# Patient Record
Sex: MALE | Race: WHITE | NOT HISPANIC OR LATINO | Employment: FULL TIME | URBAN - METROPOLITAN AREA
[De-identification: names, ages, dates, MRNs, and addresses within clinical notes are randomized per-mention and may not be internally consistent; named-entity substitution may affect disease eponyms.]

---

## 2019-02-01 ENCOUNTER — OFFICE VISIT (OUTPATIENT)
Dept: URGENT CARE | Facility: URGENT CARE | Age: 29
End: 2019-02-01
Payer: COMMERCIAL

## 2019-02-01 VITALS
SYSTOLIC BLOOD PRESSURE: 138 MMHG | WEIGHT: 315 LBS | DIASTOLIC BLOOD PRESSURE: 78 MMHG | HEART RATE: 96 BPM | OXYGEN SATURATION: 96 %

## 2019-02-01 DIAGNOSIS — M77.9 TENDONITIS: Primary | ICD-10-CM

## 2019-02-01 PROCEDURE — 99203 OFFICE O/P NEW LOW 30 MIN: CPT | Performed by: PHYSICIAN ASSISTANT

## 2019-02-01 RX ORDER — METHYLPREDNISOLONE 4 MG
TABLET, DOSE PACK ORAL
Qty: 21 TABLET | Refills: 0 | Status: SHIPPED | OUTPATIENT
Start: 2019-02-01 | End: 2021-04-24

## 2019-02-01 NOTE — PROGRESS NOTES
medrolSUBJECTIVE:  Chief Complaint   Patient presents with     Urgent Care     tendonitis and gout flare up R foot, sx present since last night, unable to schedule appt with podiatrist     Ana YENI Ch is a 29 year old male who presents with a chief complaint of right ankle pain.  Symptoms began 1 day(s) ago, are moderate and sudden onset and still present  Context:  Injury:No. Recently wearing boots which may have started the pain as he had to modify his gait. Patient thought this may have been a gout flare up so he took 2 Colchicine and 800mg IBU. Still experiencing pain.   Pain exacerbated by walking and inversion / eversion.   Relieved by rest.  He treated it initially with Colchicine and IBU. This is not the first time this type of injury has occurred to this patient. Patient reports that he had these symptoms in the past and was treated with medrol dose pack which greatly improved his symptoms.      No past medical history on file.  Current Outpatient Medications   Medication Sig Dispense Refill     sertraline (ZOLOFT) 50 MG tablet Take 50 mg by mouth daily       Social History     Tobacco Use     Smoking status: Never Smoker     Smokeless tobacco: Never Used   Substance Use Topics     Alcohol use: Not on file       ROS:  Review of systems negative except as stated above.    EXAM:   /78   Pulse 96   Wt 143.3 kg (316 lb)   SpO2 96%   M/S Exam:Right ankle has pain with inversion and eversion. No redness, swelling or bruising noted.   GENERAL APPEARANCE: healthy, alert and no distress  EXTREMITIES: peripheral pulses normal  SKIN: no suspicious lesions or rashes  NEURO: Normal strength and tone, sensory exam grossly normal, mentation intact and speech normal    X-RAY was not done.    ASSESSMENT / PLAN:  1. Tendonitis of ankle  Patient with radiating ankle pain for one day, likely related to recent boot wear.  Has a history of tendonitis and notes that his symptoms improved last time with a medrol  dose pack. Rx given. Continue to ice, stretch and wear comfortable shoes.   - methylPREDNISolone (MEDROL DOSEPAK) 4 MG tablet therapy pack; Follow Package Directions  Dispense: 21 tablet; Refill: 0    I have discussed the patient's diagnosis and my plan of treatment with the patient. We went over any labs or imaging. Patient is aware to come back in with worsening symptoms or if no relief despite treatment plan.  Patient verbalizes understanding. All questions were addressed and answered.   Madhavi Sanders PA-C

## 2019-02-01 NOTE — PATIENT INSTRUCTIONS
Patient Education     Tendonitis  A tendon is the thick fibrous cord that joins muscle to bone and allows joints to move. When a tendon becomes inflamed, it is called tendonitis. This can occur from overuse, injury, or infection. This usually involves the shoulders, forearm, wrist, hands and feet. Symptoms include pain, swelling and tenderness to the touch. Moving the joint increases the pain.  It takes 4 to 6 weeks or more for tendonitis to heal. It is treated by preventing motion of the tendon, occasionally with a splint or brace, and the use of anti-inflammatory medicine.  Home care    Some people find relief with ice packs. These can be crushed or cubed ice in a plastic bag or a bag of frozen vegetables wrapped in a thin towel. Other people get better relief with heat. This can include a hot shower, hot bath, or a moist towel warmed in a microwave. Try each and use the method that feels best, for 15 to 20 minutes several times a day.    Rest the inflamed joint and protect it from movement.    You may use over-the-counter ibuprofen or naproxen to treat pain and inflammation, unless another medicine was prescribed. If you can't take these medicines, acetaminophen may help with the pain, but does not treat inflammation. If you have chronic liver or kidney disease or ever had a stomach ulcer or gastrointestinal bleeding, talk with your doctor before using these medicines.    As your symptoms improve, begin gradual motion at the involved joint.  Follow-up care  Follow up with your healthcare provider if you are not improving after 5 to 7 days of treatment.  When to seek medical advice  Call your healthcare provider right away if any of these occur:    Redness over the painful area    Increasing pain or swelling at the joint    Fever lasting 24 to 48 hours or chills, or as advised by your healthcare provider   Date Last Reviewed: 5/1/2018 2000-2018 [employer]. [address] [facility], [place]  [address]. All rights reserved. This information is not intended as a substitute for professional medical care. Always follow your healthcare professional's instructions.

## 2019-03-28 ENCOUNTER — OFFICE VISIT (OUTPATIENT)
Dept: URGENT CARE | Facility: URGENT CARE | Age: 29
End: 2019-03-28
Payer: COMMERCIAL

## 2019-03-28 VITALS
WEIGHT: 309 LBS | HEART RATE: 109 BPM | TEMPERATURE: 99 F | SYSTOLIC BLOOD PRESSURE: 138 MMHG | DIASTOLIC BLOOD PRESSURE: 80 MMHG | OXYGEN SATURATION: 95 %

## 2019-03-28 DIAGNOSIS — M77.9 TENDINITIS: Primary | ICD-10-CM

## 2019-03-28 PROCEDURE — 99213 OFFICE O/P EST LOW 20 MIN: CPT | Performed by: PHYSICIAN ASSISTANT

## 2019-03-28 RX ORDER — METHYLPREDNISOLONE 4 MG
TABLET, DOSE PACK ORAL
Qty: 21 TABLET | Refills: 0 | Status: SHIPPED | OUTPATIENT
Start: 2019-03-28 | End: 2021-11-15

## 2019-03-28 NOTE — PROGRESS NOTES
SUBJECTIVE:   Ana Ch is a 29 year old male presenting with a chief complaint of   Chief Complaint   Patient presents with     Urgent Care     Foot Burn     pt c/o both feet swollen sore and hard to walk on --hx of tendonitis both legs off and on since last summer--pt is wearing boot on R foot --feels better with it on for tendonitis       He is an established patient of [facility].    MS Injury/Pain    Patient presented to the urgent care for the evaluation of bilateral foot pain.  He mentioned that his bilateral feet are swollen, sore and hard to walk on.  He has history of tendinitis for which he regularly follows up with his primary care provider.  He was also seen at the urgent care in the past for similar symptoms and was prescribed Medrol pack which helped him with subsiding his pain .  onset of symptoms was 2 day(s) ago. Has a history of tendinitis flare for 1 week. Right foot has a throbbing,   Location: bilateral foot  Context: No history of injury trauma or fall.  He has past medical history of tendinitis  Course of symptoms is worsening.    Severity moderate, graded the pain without weight bearing is 2, with  Weight 7/10 on a pain scale.   Current and Associated symptoms: Pain and Warmth  Aggravating Factors: walking, running and weight-bearing  Therapies to improve symptoms include: heat and ice  This is not the first time this type of problem has occurred for this patient.       Review of Systems 10 point review of systems performed and is negative except as above and in HPI.      No past medical history on file.  Past history of tendinitis  No family history on file.  Current Outpatient Medications   Medication Sig Dispense Refill     methylPREDNISolone (MEDROL DOSEPAK) 4 MG tablet therapy pack Follow Package Directions 21 tablet 0     sertraline (ZOLOFT) 50 MG tablet Take 50 mg by mouth daily       methylPREDNISolone (MEDROL DOSEPAK) 4 MG tablet therapy pack Follow Package Directions (Patient  not taking: Reported on 3/28/2019) 21 tablet 0     Social History     Tobacco Use     Smoking status: Never Smoker     Smokeless tobacco: Never Used   Substance Use Topics     Alcohol use: Not on file       OBJECTIVE  /80 (Cuff Size: Adult Large)   Pulse 109   Temp 99  F (37.2  C) (Oral)   Wt 140.2 kg (309 lb)   SpO2 95%     Physical Exam   Constitutional: He appears well-developed and well-nourished.   HENT:   Head: Normocephalic.   Right Ear: External ear normal.   Left Ear: External ear normal.   Eyes: Conjunctivae are normal. Right eye exhibits no discharge. Left eye exhibits no discharge. No scleral icterus.   Neck: Normal range of motion. No tracheal deviation present.   Cardiovascular: Normal rate.   Pulmonary/Chest: Effort normal.   Musculoskeletal: Normal range of motion.   Bilateral dorsal aspect of the feet are swollen and tender.  Weightbearing exaggerates the pain.  Patient has normal range of motion on bilateral knee and ankle.  On palpation bilateral feet and leg temperature is slightly raised.  Feet are not erythematous.  Plantar aspect of the feet is tender on palpation right more than the left.   Vitals reviewed.      Labs:  No results found for this or any previous visit (from the past 24 hour(s)).    X-Ray was not done.    ASSESSMENT:      ICD-10-CM    1. Tendinitis M77.9 methylPREDNISolone (MEDROL DOSEPAK) 4 MG tablet therapy pack        Medical Decision Making:    Patient has recurrent history of tendinitis.  This time his symptoms are similar with his past tendinitis symptoms where he gets bilateral feet swelling and tenderness and decreased weightbearing for the pain.  On exam patient was alert oriented with and vitals are within normal limit.  Her but he has bilateral feet is mildly tender on palpation without any signs of erythema, or cellulitis.  There was no visible skin lesions noted.  Bilateral feet skin is intact.  Patient showed normal range of motion bilaterally.  And not  concerned about any fracture or dislocation or cellulitis at this time.  Patient has past medical history of recurrent tendinitis and was treated with Medrol pack which helped him significantly with the pain relief.  This time I have prescribed Medrol pack for him.  However patient is advised that if the symptoms are not getting better in next 2 days or worsening in next 24 hours then he needs to follow-up with his primary care for a possible CBC, and recheck the vitals to rule out possible cellulitis.  At this time no antibiotic has been prescribed as patient does not have any fever, systemic symptoms, and the feet did not show any signs of cellulitis.  Patient uses a boot to prevent excessive weightbearing on his feet which helped him in the past.      PLAN:  Tendinitis Medrol pack  If symptoms are not improving in 24-48 hours, or worsening please visit your primary care provider for further evaluation and management of bilateral feet swelling and pain.    Followup:    If not improving or if condition worsens, follow up with your Primary Care Provider    There are no Patient Instructions on file for this visit.

## 2019-05-19 ENCOUNTER — OFFICE VISIT (OUTPATIENT)
Dept: URGENT CARE | Facility: URGENT CARE | Age: 29
End: 2019-05-19
Payer: COMMERCIAL

## 2019-05-19 VITALS
OXYGEN SATURATION: 99 % | HEART RATE: 80 BPM | RESPIRATION RATE: 16 BRPM | SYSTOLIC BLOOD PRESSURE: 118 MMHG | DIASTOLIC BLOOD PRESSURE: 88 MMHG | TEMPERATURE: 98.3 F

## 2019-05-19 DIAGNOSIS — M79.671 RIGHT FOOT PAIN: Primary | ICD-10-CM

## 2019-05-19 DIAGNOSIS — R19.7 DIARRHEA, UNSPECIFIED TYPE: ICD-10-CM

## 2019-05-19 PROCEDURE — 99214 OFFICE O/P EST MOD 30 MIN: CPT

## 2019-05-19 NOTE — LETTER
[facility]  [address] [address]  Phone: [phone]    05/19/19    Ana Ch  [id number]  [facility] [address]      To whom it may concern:     Ana Ch was seen in the clinic today for current illness. He is feeling better and can get back to work tomorrow.    Sincerely,      [facility] Provider, MD

## 2019-05-19 NOTE — PROGRESS NOTES
Chief Complaint   Patient presents with     Urgent Care     Stomach pain and diarrhea for 5 days.  Right foot pain for 6-7 months.      SUBJECTIVE:   Ana Ch is a 29 year old male presenting with a chief complaint of right-sided foot pain which is mostly in the lateral aspect of the foot along the fifth metatarsal bone had it for 6 or 7 months but that got better but that again the pain is back for last 1 week.  He has been using insoles and wearing comfortable shoes but the pain has been still there has been seen by podiatrist did have an x-ray and there was no abnormality noted.  He also had some diarrhea episodes over the last 5 days and it resolved today and from today he has a regular bowel movement and appetite is within normal limits.  Denies any fever chills or acute abdominal pain.  Patient did miss work on Wednesday and Friday for the diarrhea.   He is an established patient of [email or web address].  Onset of symptoms was 1 week(s) ago.  Course of illness is same.    Severity moderate  Current and Associated symptoms: diarrhea resolved   Treatment measures tried include ibuprofen for the foot pain .  Predisposing factors include None.    History reviewed. No pertinent past medical history.  Current Outpatient Medications   Medication Sig Dispense Refill     sertraline (ZOLOFT) 50 MG tablet Take 50 mg by mouth daily       methylPREDNISolone (MEDROL DOSEPAK) 4 MG tablet therapy pack Follow Package Directions (Patient not taking: Reported on 5/19/2019) 21 tablet 0     methylPREDNISolone (MEDROL DOSEPAK) 4 MG tablet therapy pack Follow Package Directions (Patient not taking: Reported on 3/28/2019) 21 tablet 0     Social History     Tobacco Use     Smoking status: Never Smoker     Smokeless tobacco: Never Used   Substance Use Topics     Alcohol use: Not on file     History reviewed. No pertinent family history.      ROS:    10 point ROS of systems including Constitutional, Eyes, Respiratory, Cardiovascular,  Genitourinary, Integumentary, Psychiatric were all negative except for pertinent positives noted in my HPI         OBJECTIVE:  /88   Pulse 80   Temp 98.3  F (36.8  C) (Tympanic)   Resp 16   SpO2 99%   GENERAL APPEARANCE: healthy, alert and no distress  EYES: EOMI,  PERRL, conjunctiva clear  HENT: ear canals and TM's normal.  Nose and mouth without ulcers, erythema or lesions  NECK: supple, nontender, no lymphadenopathy  RESP: lungs clear to auscultation - no rales, rhonchi or wheezes  CV: regular rates and rhythm, normal S1 S2, no murmur noted  ABDOMEN:  soft, nontender, no HSM or masses and bowel sounds normal  Extremities: feet normal, good pulses, normal color, temperature and sensation, tenderness noted in the lateral aspect of the right foot along the fifth metatarsal bone  SKIN: no suspicious lesions or rashes  Physical Exam      X-Ray was not done.    Medical Decision Making:      ASSESSMENT:  Ana was seen today for urgent care.    Diagnoses and all orders for this visit:    Right foot pain    Diarrhea, unspecified type          PLAN:  Patient was also given a letter for his work that he was seen in the clinic today, For current illness  Ibuprofen 600 to 800 mg with food every 6-8 hours for next 3 to 4 days  Advised to do some foot stretching   Showed him some stretching exercises   Discussed that it seems to be related to tendinitis  Follow up if  symptoms fail to improve or worsens   Pt understood and agreed with plan       Jessica Fine MD     See orders in Epic

## 2019-09-08 ENCOUNTER — OFFICE VISIT (OUTPATIENT)
Dept: URGENT CARE | Facility: URGENT CARE | Age: 29
End: 2019-09-08
Payer: COMMERCIAL

## 2019-09-08 ENCOUNTER — ANCILLARY PROCEDURE (OUTPATIENT)
Dept: GENERAL RADIOLOGY | Facility: CLINIC | Age: 29
End: 2019-09-08
Attending: FAMILY MEDICINE
Payer: COMMERCIAL

## 2019-09-08 VITALS
SYSTOLIC BLOOD PRESSURE: 150 MMHG | TEMPERATURE: 98.5 F | HEART RATE: 79 BPM | BODY MASS INDEX: 46.07 KG/M2 | HEIGHT: 68 IN | WEIGHT: 304 LBS | RESPIRATION RATE: 16 BRPM | DIASTOLIC BLOOD PRESSURE: 90 MMHG

## 2019-09-08 DIAGNOSIS — M25.561 ACUTE PAIN OF RIGHT KNEE: ICD-10-CM

## 2019-09-08 DIAGNOSIS — R03.0 ELEVATED BLOOD PRESSURE READING WITHOUT DIAGNOSIS OF HYPERTENSION: ICD-10-CM

## 2019-09-08 DIAGNOSIS — M25.561 ACUTE PAIN OF RIGHT KNEE: Primary | ICD-10-CM

## 2019-09-08 PROCEDURE — 99214 OFFICE O/P EST MOD 30 MIN: CPT | Performed by: FAMILY MEDICINE

## 2019-09-08 PROCEDURE — 73562 X-RAY EXAM OF KNEE 3: CPT | Mod: RT

## 2019-09-08 RX ORDER — PREDNISONE 20 MG/1
TABLET ORAL
Qty: 12 TABLET | Refills: 0 | Status: SHIPPED | OUTPATIENT
Start: 2019-09-08 | End: 2021-11-15

## 2019-09-08 ASSESSMENT — MIFFLIN-ST. JEOR: SCORE: 2313.43

## 2019-09-08 NOTE — PROGRESS NOTES
"Subjective:   Ana Ch is a 29 year old male who presents for   Chief Complaint   Patient presents with     Knee Pain     rt knee pain,no known injury     Has had incidents in the past where extending the leg caused discomfort while walking. States 4 days ago while walking he may have hyperextended the leg while ambulating and since there has been soreness and some warmth. There is present swelling. Consistent pain since Thursday morning  He denies open wounds. No previous surgeries. No sports injuries growing up.     Hx of gout off/on the last 9 months - he is diet controlled with no flare ups 3 months. Uric acid is being monitored  Location is typically in the big toes    PCP: [facility] in [place], [place]    There are no active problems to display for this patient.      Current Outpatient Medications   Medication     order for DME     predniSONE (DELTASONE) 20 MG tablet     sertraline (ZOLOFT) 50 MG tablet     methylPREDNISolone (MEDROL DOSEPAK) 4 MG tablet therapy pack     methylPREDNISolone (MEDROL DOSEPAK) 4 MG tablet therapy pack     No current facility-administered medications for this visit.        ROS:  As above per HPI    Objective:   BP (!) 150/90   Pulse 79   Temp 98.5  F (36.9  C) (Oral)   Resp 16   Ht 1.727 m (5' 8\")   Wt 137.9 kg (304 lb)   BMI 46.22 kg/m  , Body mass index is 46.22 kg/m .  Gen:  NAD, well-nourished, sitting in chair comfortably  HEENT: EOMI, sclera anicteric, Head normocephalic, ; nares patent; moist mucous membranes  Neck: trachea midline, no thyromegaly  CV:  Hemodynamically stable  Pulm:  no increased work of breathing   R knee: obese habitus, no obvious warmth or swelling. No open wounds. Unable to perform active leg extension but has full ROM in flexion/extension passively. No bruising seen  Extrem: no cyanosis, edema or clubbing  Skin: no obvious rashes or abnormalities  Psych: Euthymic, linear thoughts, normal rate of speech  Gait: antalgic " favoring left knee    XR knee 3 views: no acute fractures per my read    No results found for this or any previous visit.      Assessment & Plan:   Ana Ch, 29 year old male who presents with:  Acute pain of right knee  Given level of discomfort and history of gout we opted to do 8 days of prednisone as a taper to help with possible inflammation. A knee brace was given to provide comfort, patient declined crutches. If symptoms not better in 3-5 days return for re-evaluation. Okay to ice the knee intermittently. Ibuprofen/tylenol for pain.   - XR Knee Right 3 Views  - predniSONE (DELTASONE) 20 MG tablet  Dispense: 12 tablet; Refill: 0  - order for DME  Dispense: 1 Device; Refill: 0    Elevated blood pressure reading without diagnosis of hypertension  Likely due to agitation, reviewed with patient and discussed should monitor at future appts      Marko Wyatt MD   [facility]    The use of Dragon/[employer] dictation services may have been used to construct the content in this note; any grammatical or spelling errors are non-intentional. Please contact the author of this note directly if you are in need of any clarification.

## 2019-09-08 NOTE — PATIENT INSTRUCTIONS
Continue the ibuprofen 600mg every 4-6 hours for pain. Okay to take tylenol for additional pain relief    Ice the knee for 10 minutes every 1-2 hours to help with any swelling you may have      If no improvement in next 5 days or if at any point the discomfort/swelling/pain gets worse return to be re-evaluated

## 2020-10-02 ENCOUNTER — OFFICE VISIT (OUTPATIENT)
Dept: URGENT CARE | Facility: URGENT CARE | Age: 30
End: 2020-10-02
Payer: COMMERCIAL

## 2020-10-02 ENCOUNTER — ANCILLARY PROCEDURE (OUTPATIENT)
Dept: GENERAL RADIOLOGY | Facility: CLINIC | Age: 30
End: 2020-10-02
Attending: NURSE PRACTITIONER
Payer: COMMERCIAL

## 2020-10-02 VITALS
OXYGEN SATURATION: 98 % | SYSTOLIC BLOOD PRESSURE: 128 MMHG | HEART RATE: 90 BPM | DIASTOLIC BLOOD PRESSURE: 86 MMHG | WEIGHT: 313 LBS | RESPIRATION RATE: 18 BRPM | BODY MASS INDEX: 47.59 KG/M2 | TEMPERATURE: 98.7 F

## 2020-10-02 DIAGNOSIS — Z87.448 HX OF CHRONIC KIDNEY DISEASE: ICD-10-CM

## 2020-10-02 DIAGNOSIS — M25.461 EFFUSION OF RIGHT KNEE: ICD-10-CM

## 2020-10-02 DIAGNOSIS — M25.561 ACUTE PAIN OF RIGHT KNEE: Primary | ICD-10-CM

## 2020-10-02 PROBLEM — N18.1 CKD (CHRONIC KIDNEY DISEASE) STAGE 1, GFR 90 ML/MIN OR GREATER: Status: ACTIVE | Noted: 2020-10-02

## 2020-10-02 PROBLEM — I10 HTN (HYPERTENSION): Status: ACTIVE | Noted: 2020-10-02

## 2020-10-02 LAB
ERYTHROCYTE [DISTWIDTH] IN BLOOD BY AUTOMATED COUNT: 13.9 % (ref 10–15)
HCT VFR BLD AUTO: 43.5 % (ref 40–53)
HGB BLD-MCNC: 14.3 G/DL (ref 13.3–17.7)
MCH RBC QN AUTO: 27.3 PG (ref 26.5–33)
MCHC RBC AUTO-ENTMCNC: 32.9 G/DL (ref 31.5–36.5)
MCV RBC AUTO: 83 FL (ref 78–100)
PLATELET # BLD AUTO: 264 10E9/L (ref 150–450)
RBC # BLD AUTO: 5.23 10E12/L (ref 4.4–5.9)
WBC # BLD AUTO: 10.8 10E9/L (ref 4–11)

## 2020-10-02 PROCEDURE — 36415 COLL VENOUS BLD VENIPUNCTURE: CPT | Performed by: NURSE PRACTITIONER

## 2020-10-02 PROCEDURE — 85027 COMPLETE CBC AUTOMATED: CPT | Performed by: NURSE PRACTITIONER

## 2020-10-02 PROCEDURE — 99214 OFFICE O/P EST MOD 30 MIN: CPT | Performed by: NURSE PRACTITIONER

## 2020-10-02 PROCEDURE — 73562 X-RAY EXAM OF KNEE 3: CPT | Mod: RT | Performed by: RADIOLOGY

## 2020-10-02 RX ORDER — PREDNISONE 20 MG/1
40 TABLET ORAL DAILY
Qty: 10 TABLET | Refills: 0 | Status: SHIPPED | OUTPATIENT
Start: 2020-10-02 | End: 2020-10-07

## 2020-10-03 NOTE — PATIENT INSTRUCTIONS
Patient Education     RICE     Rest an injury, elevate it, and use ice and compression as directed.   RICE stands for rest, ice, compression, and elevation. These can limit pain and swelling after an injury. RICE may be recommended to help treat breaks (fractures), sprains, strains, and bruises or bumps.   Home care  Here are the details of RICE:    Rest. Limit the use of the injured body part. This helps prevent further damage to the body part and gives it time to heal. In some cases, you may need a sling, brace, splint, or cast to help keep the body part still until it has healed.    Ice. Applying ice right after an injury helps relieve pain and swelling. To make an ice pack, put ice cubes in a plastic bag that seals at the top. Wrap the bag in a clean, thin towel or cloth. Then place it over the injured area. Do this for 10 to 15 minutes every 3 to 4 hours. Continue for the next 1 to 3 days or until your symptoms improve. Never put ice directly on your skin. Don't ice an area longer than 15 minutes at a time.    Compression. Putting pressure on an injury helps reduce swelling and provides support. Wrap the injured area firmly with an elastic bandage or wrap. Make sure not to wrap the bandage too tightly or you will cut off blood flow to the injured area. If your bandage loosens, rewrap it.    Elevation. Keeping an injury raised or elevated above the level of your heart reduces swelling, pain, and throbbing. For instance, if you have a broken leg, it may help to rest your leg on several pillows when sitting or lying down. Try to keep the injured area elevated as often as possible.  Follow-up care  Follow up with your healthcare provider, or as advised.  When to seek medical advice  Call your healthcare provider right away if any of these occur:    Fever of 100.4 F (38 C) or higher, or as directed by your healthcare provider    Chills    Increased pain or swelling in the injured body part    Injured body part  becomes cold, blue, numb, or tingly    Signs of infection. These include warmth in the skin, redness, drainage, or bad smell coming from the injured body part.  Date Last Reviewed: 6/1/2018 2000-2019 [employer]. [address] [place] [address]. All rights reserved. This information is not intended as a substitute for professional medical care. Always follow your healthcare professional's instructions.           Patient Education     Fluid on the Knee    Fluid on the knee is also known as knee effusion. The knee joint normally has less than 1 ounce of fluid. Injury or inflammation of the knee joint causes extra fluid to collect there. When this happens, the knee joint looks swollen and is often painful. It may be hard to fully bend the knee.  The most common cause of fluid on the knee is osteoarthritis due to wear and tear on the joint cartilage. Other causes include injury to the cartilage, inflammatory arthritis such as gout or rheumatoid arthritis, and infection of the joint.  If the cause of the fluid is not certain, you may need a needle aspiration. This procedure removes a sample of joint fluid from the knee for testing. Removing excess fluid may also relieve swelling and pain.  Home care    Limit your activities. Stay off the injured leg as much as possible until pain improves.    Keep your leg elevated to reduce pain and swelling. When sleeping, place a pillow under the injured leg. When sitting, support the injured leg so it is above heart level. This is very important during the first 48 hours.    Apply an ice pack over the injured area for 15 to 20 minutes every 3 to 6 hours. You should do this for the first 24 to 48 hours. You can make an ice pack by filling a plastic bag that seals at the top with ice cubes and then wrapping it with a thin towel. Continue to use ice packs for relief of pain and swelling as needed. As the ice melts, be careful not to get your wrap, splint, or  cast wet. After 48 hours, apply heat (warm shower or warm bath) for 15 to 20 minutes several times a day, or alternate ice and heat. If you have to wear a hook-and-loop knee brace, you can open it to apply the ice pack, or heat, directly to the knee. Never put ice directly on the skin. Always wrap the ice in a towel or other type of cloth.    You may use over-the-counter pain medicine to control pain, unless another pain medicine was prescribed. If you have chronic liver or kidney disease or have ever had a stomach ulcer or gastrointestinal bleeding, talk with your healthcare provider before using these medicines.    If crutches or a walker have been recommended, don't put weight on the injured leg until you can do so without pain. Check with your healthcare provider before returning to sports or full work duties.    If you have a hook-and-loop knee brace, you can remove it to bathe and sleep, unless told otherwise.  Follow-up care  Follow up with your healthcare provider as advised.  If you are overweight, talk to your healthcare provider about a weight loss program. The excess weight puts extra strain on your knees.  When to seek medical advice  Call your healthcare provider right away if any of these occur:    Increasing pain, redness, or swelling of the knee    Fever of 100.4 F (38 C) or above lasting for 24 to 48 hours, or as advised    Shaking chills  Date Last Reviewed: 5/1/2018 2000-2019 [employer]. [address] [place] [address], [place] [address]. All rights reserved. This information is not intended as a substitute for professional medical care. Always follow your healthcare professional's instructions.

## 2020-10-03 NOTE — PROGRESS NOTES
"SUBJECTIVE:   Ana Ch is a 30 year old male presenting with a chief complaint of   Chief Complaint   Patient presents with     Urgent Care     Knee Pain     right knee pain for couple of days. Swollen and fluid build-up.       He is an established patient of [facility].    MS Injury/Pain    Onset of symptoms was a couple days ago.  Location: right knee  Context: No known injury  Course of symptoms is worsening.    Severity severe. 7/10 with standing, 1/10 resting  Current and Associated symptoms: Pain and Swelling along with increased heat, had been red but that resolved  Denies  Fever, Bruising and Redness  Aggravating Factors: walking, weight-bearing, movement, flexion/extension and standing  Therapies to improve symptoms include: ice, Tylenol, rest and elevation which help temporarily  This is not the first time this type of problem has occurred for this patient.   He has had \"foot problems\" for the past 15 years which sometimes cause knee pain. He had similar pain a year ago which resolved with ibuprofen. He is now unable to take NSAIDs due to chronic kidney disease. Pain does wake him at night. He has a history of right knee bursitis.     Problem list, Medication list, Allergies, and Medical history reviewed in EPIC.    ROS:  Review of systems negative except for noted above    OBJECTIVE  /86   Pulse 90   Temp 98.7  F (37.1  C) (Tympanic)   Resp 18   Wt 142 kg (313 lb)   SpO2 98%   BMI 47.59 kg/m      Physical Exam  Constitutional:       General: He is not in acute distress.     Appearance: He is obese. He is not toxic-appearing.   Musculoskeletal:      Right knee: He exhibits decreased range of motion and effusion.      Comments: Unable to assess knee fully due to pain. Moderate edema right knee. Decreased ROM with flexion and extension. Patient did not tolerate palpation, ROM, or testing of ligaments   Skin:     General: Skin is warm and dry.      Capillary Refill: Capillary refill takes " less than 2 seconds.      Findings: No bruising or erythema.      Comments: No increased warmth       X-ray right knee was performed and reviewed independently by myself showing no obvious fracture or dislocation  Radiologist impression:  :  Results for orders placed or performed in visit on 10/02/20 (from the past 24 hour(s))   XR Knee Right 3 Views    Narrative    Examination:  XR KNEE RT 3 VW    Date:  10/2/2020 8:15 PM     Clinical Information: Acute pain of right knee     Additional Information: none    Comparison: 9/8/2019.      Impression    Impression:    1.  Right knee negative for fracture or dislocation.  2.  Suprapatellar soft tissue fullness suggestive of joint effusion.  3.  Prepatellar soft tissue swelling.    PAUL MADSEN MD   CBC with platelets   Result Value Ref Range    WBC 10.8 4.0 - 11.0 10e9/L    RBC Count 5.23 4.4 - 5.9 10e12/L    Hemoglobin 14.3 13.3 - 17.7 g/dL    Hematocrit 43.5 40.0 - 53.0 %    MCV 83 78 - 100 fl    MCH 27.3 26.5 - 33.0 pg    MCHC 32.9 31.5 - 36.5 g/dL    RDW 13.9 10.0 - 15.0 %    Platelet Count 264 150 - 450 10e9/L     ASSESSMENT:      ICD-10-CM    1. Acute pain of right knee  M25.561 XR Knee Right 3 Views     EXTENSOR KNEE SLEEVE XL   2. Hx of chronic kidney disease  Z87.448    3. Effusion of right knee  M25.461 CBC with platelets     predniSONE (DELTASONE) 20 MG tablet     EXTENSOR KNEE SLEEVE XL      PLAN:    Reviewed knee xray images and results with patient during visit showing no fracture or dislocation, but swelling and effusion present. Due to patients pain not tolerating exam, recommended further evaluation in orthopedic urgent care now. Patient declines. Ordered CBC to rule out infection, which is normal and reviewed during visit. Recommended RICE: rest, ice every hour for 20 minutes, compress, elevate. He is fitted with knee sleeve during visit. Prescription sent to pharmacy for prednisone 40 mg daily for 5 days for swelling since he cannot take NSAIDs  due to CKD. Potential side effects discussed.     Follow-up with orthopedic urgent care within 2 days, and sooner if symptoms worsen or new symptoms develop.     Discussed red flag symptoms which warrant immediate visit in emergency room    All questions were answered and patient verbalized understanding. AVS reviewed with patient.     Mariah Ramos, FRANSISCO, APRN, CNP 10/2/2020 8:55 PM

## 2021-04-24 ENCOUNTER — OFFICE VISIT (OUTPATIENT)
Dept: URGENT CARE | Facility: URGENT CARE | Age: 31
End: 2021-04-24
Payer: COMMERCIAL

## 2021-04-24 ENCOUNTER — ANCILLARY PROCEDURE (OUTPATIENT)
Dept: GENERAL RADIOLOGY | Facility: CLINIC | Age: 31
End: 2021-04-24
Attending: NURSE PRACTITIONER
Payer: COMMERCIAL

## 2021-04-24 VITALS
OXYGEN SATURATION: 97 % | WEIGHT: 313 LBS | TEMPERATURE: 98.4 F | BODY MASS INDEX: 47.59 KG/M2 | HEART RATE: 102 BPM | SYSTOLIC BLOOD PRESSURE: 170 MMHG | DIASTOLIC BLOOD PRESSURE: 72 MMHG

## 2021-04-24 DIAGNOSIS — S93.401A SPRAIN OF RIGHT ANKLE, UNSPECIFIED LIGAMENT, INITIAL ENCOUNTER: Primary | ICD-10-CM

## 2021-04-24 DIAGNOSIS — M77.9 TENDONITIS: ICD-10-CM

## 2021-04-24 DIAGNOSIS — S99.912A INJURY OF LEFT ANKLE, INITIAL ENCOUNTER: ICD-10-CM

## 2021-04-24 DIAGNOSIS — S99.911A INJURY OF RIGHT ANKLE, INITIAL ENCOUNTER: ICD-10-CM

## 2021-04-24 PROBLEM — M10.9 GOUT: Status: ACTIVE | Noted: 2020-03-10

## 2021-04-24 PROBLEM — F41.8 MIXED ANXIETY AND DEPRESSIVE DISORDER: Status: ACTIVE | Noted: 2020-03-10

## 2021-04-24 PROBLEM — E66.9 OBESITY: Status: ACTIVE | Noted: 2020-03-10

## 2021-04-24 PROCEDURE — 99214 OFFICE O/P EST MOD 30 MIN: CPT | Performed by: NURSE PRACTITIONER

## 2021-04-24 PROCEDURE — 73610 X-RAY EXAM OF ANKLE: CPT | Mod: RT | Performed by: RADIOLOGY

## 2021-04-24 RX ORDER — METHYLPREDNISOLONE 4 MG
TABLET, DOSE PACK ORAL
Qty: 21 TABLET | Refills: 0 | Status: SHIPPED | OUTPATIENT
Start: 2021-04-24 | End: 2021-11-15

## 2021-04-24 ASSESSMENT — ENCOUNTER SYMPTOMS
ADENOPATHY: 0
SHORTNESS OF BREATH: 0
WHEEZING: 0
WEAKNESS: 0
DIAPHORESIS: 0
ABDOMINAL PAIN: 0
APPETITE CHANGE: 0
FATIGUE: 0
ARTHRALGIAS: 1
SLEEP DISTURBANCE: 0
FEVER: 0
NAUSEA: 0
PARESTHESIAS: 0
WOUND: 0
CHILLS: 0
JOINT SWELLING: 1
DIZZINESS: 0
CHEST TIGHTNESS: 0
LIGHT-HEADEDNESS: 0
MYALGIAS: 1
COLOR CHANGE: 0
ACTIVITY CHANGE: 0
PALPITATIONS: 0
VOMITING: 0

## 2021-04-24 NOTE — PROGRESS NOTES
Chief Complaint   Patient presents with     Urgent Care     Ankle Problem     c/o ankle injury for 4 days     SUBJECTIVE:  Ana Ch is a 31 year old male who presents to the clinic today with an ankle injury. He was moving boxes 4 days ago, increasing severe lateral malleolus pain, cannot bear weight or walk on the foot, edema, lost ROM due to pain. He did not hear or feel popping or crunching. He has CKD and cannot take NSAIDS, requesting medrol dosepak as this has worked well for him in the past. Denies concerns for DVTs, redness, pitting edema, varicosities, recent covid vaccine.    No past medical history on file.  emtricitabine-tenofovir AF (DESCOVY) 200-25 MG per tablet, Take 1 tablet by mouth daily  methylPREDNISolone (MEDROL DOSEPAK) 4 MG tablet therapy pack, Follow Package Directions  order for DME, Equipment being ordered: right knee brace/sleeve  predniSONE (DELTASONE) 20 MG tablet, 40mg for 4 days then 20mg for next 4 days  sertraline (ZOLOFT) 50 MG tablet, Take 50 mg by mouth daily    No current facility-administered medications on file prior to visit.     Social History     Tobacco Use     Smoking status: Never Smoker     Smokeless tobacco: Never Used   Substance Use Topics     Alcohol use: Not on file     Allergies   Allergen Reactions     Nifedipine      Needs to have Procardia XL     Review of Systems   Constitutional: Negative for activity change, appetite change, chills, diaphoresis, fatigue and fever.   Respiratory: Negative for chest tightness, shortness of breath and wheezing.    Cardiovascular: Negative for palpitations.   Gastrointestinal: Negative for abdominal pain, nausea and vomiting.   Musculoskeletal: Positive for arthralgias, gait problem, joint swelling and myalgias.   Skin: Negative for color change, pallor, rash and wound.   Neurological: Negative for dizziness, weakness, light-headedness and paresthesias.   Hematological: Negative for adenopathy.   Psychiatric/Behavioral:  Negative for sleep disturbance.     EXAM:   BP (!) 170/72   Pulse 102   Temp 98.4  F (36.9  C) (Tympanic)   Wt 142 kg (313 lb)   SpO2 97%   BMI 47.59 kg/m      Physical Exam  Vitals signs reviewed.   Constitutional:       General: He is not in acute distress.     Appearance: Normal appearance. He is not ill-appearing, toxic-appearing or diaphoretic.   HENT:      Head: Normocephalic and atraumatic.      Nose: Nose normal.      Mouth/Throat:      Mouth: Mucous membranes are moist.      Pharynx: Oropharynx is clear.   Eyes:      Extraocular Movements: Extraocular movements intact.      Conjunctiva/sclera: Conjunctivae normal.      Pupils: Pupils are equal, round, and reactive to light.   Neck:      Musculoskeletal: Normal range of motion and neck supple.   Cardiovascular:      Rate and Rhythm: Normal rate.   Pulmonary:      Effort: Pulmonary effort is normal.   Musculoskeletal:         General: Swelling, tenderness and signs of injury present.      Comments: Right lateral malleolus with tenderness, edema and diminished range of motion due to pain. There is no redness, warmth, bruising.   Skin:     General: Skin is warm and dry.      Findings: No bruising, erythema or rash.   Neurological:      General: No focal deficit present.      Mental Status: He is alert and oriented to person, place, and time.   Psychiatric:         Mood and Affect: Mood normal.         Behavior: Behavior normal.       Xray done in clinic read by me as negative for fracture.  Results for orders placed or performed in visit on 04/24/21   XR Ankle Right G/E 3 Views     Status: None    Narrative    Examination:  XR ANKLE RIGHT G/E 3 VIEWS    Date:  4/24/2021 11:49 AM     Clinical Information: moving boxes 4 days ago, increasing severe  lateral malleolus pain, cannot bear weight or walk on the foot, edema,  lost ROM due to pain; Injury of left ankle, initial encounter.     Comparison: none.      Impression    Impression:    1.  Right ankle  negative for fracture or dislocation.    2.  Moderate ankle joint effusion. Marked soft tissue swelling  throughout the ankle, lateral greater than medial.    3.  Maintained joint spacing throughout the hindfoot joints.    PAUL MADSEN MD     ASSESSMENT:    ICD-10-CM    1. Sprain of right ankle, unspecified ligament, initial encounter  S93.401A Crutches Order for DME - ONLY FOR DME     Ankle/Foot Bracing Supplies Order for DME - ONLY FOR DME   2. Injury of right ankle, initial encounter  S99.911A CANCELED: XR Ankle Left G/E 3 Views   3. Tendonitis  M77.9 methylPREDNISolone (MEDROL DOSEPAK) 4 MG tablet therapy pack     PLAN:  Patient Instructions   Ankle xray done in clinic  Likely sprain with inflammation  Rest, ice, compression, elevate  Prednisone per request, discussed adverse side effects including hypertension etc. Could have white coat hypertension and increase due to pain.  Patient will be checking BP at home and monitoring for side effects.  Walk, stretch, watchful waiting  Reevaluation if worsening redness, warmth, pitting edema    Follow up with primary care provider with any problems, questions or concerns or if symptoms worsen or fail to improve. Patient agreed to plan and verbalized understanding.    Sabrina De La Torre, [facility]ARG[facility]

## 2021-04-24 NOTE — PATIENT INSTRUCTIONS
Ankle xray done in clinic  Likely sprain with inflammation  Rest, ice, compression, elevate  Prednisone per request, discussed adverse side effects including hypertension etc. Could have white coat hypertension and increase due to pain.  Patient will be checking BP at home and monitoring for side effects.  Walk, stretch, watchful waiting  Reevaluation if worsening redness, warmth, pitting edema

## 2021-08-20 NOTE — TELEPHONE ENCOUNTER
FUTURE VISIT INFORMATION      FUTURE VISIT INFORMATION:    Date: 9/13/21    Time: 7:30 AM    Location: [facility]  REFERRAL INFORMATION:    Referring provider: Self-Referred    Referring providers clinic:     Reason for visit/diagnosis: Right eyelid drooping again, prior surgery 2007 with Dr. Breen    RECORDS REQUESTED FROM:       Clinic name Comments Records Status Imaging [facility]   [facility] Dr. Brian [facility]    [facility] - OP Note for BILATERAL UPPER EYELID CONGENITAL PTOSIS REPAIR BY EXTERNAL LEVATOR RESECTION with Dr. Jamshid Arora

## 2021-09-13 ENCOUNTER — PRE VISIT (OUTPATIENT)
Dept: OPHTHALMOLOGY | Facility: CLINIC | Age: 31
End: 2021-09-13

## 2021-09-13 ENCOUNTER — TELEPHONE (OUTPATIENT)
Dept: OPHTHALMOLOGY | Facility: CLINIC | Age: 31
End: 2021-09-13

## 2021-09-13 ENCOUNTER — OFFICE VISIT (OUTPATIENT)
Dept: OPHTHALMOLOGY | Facility: CLINIC | Age: 31
End: 2021-09-13
Payer: COMMERCIAL

## 2021-09-13 DIAGNOSIS — Z11.59 ENCOUNTER FOR SCREENING FOR OTHER VIRAL DISEASES: ICD-10-CM

## 2021-09-13 DIAGNOSIS — Q10.0 CONGENITAL PTOSIS OF EYELID: ICD-10-CM

## 2021-09-13 PROCEDURE — 99203 OFFICE O/P NEW LOW 30 MIN: CPT | Mod: GC | Performed by: OPHTHALMOLOGY

## 2021-09-13 PROCEDURE — 92285 EXTERNAL OCULAR PHOTOGRAPHY: CPT | Mod: GC | Performed by: OPHTHALMOLOGY

## 2021-09-13 PROCEDURE — 92082 INTERMEDIATE VISUAL FIELD XM: CPT | Mod: GC | Performed by: OPHTHALMOLOGY

## 2021-09-13 RX ORDER — ALLOPURINOL 100 MG/1
50 TABLET ORAL DAILY
COMMUNITY
Start: 2021-01-22

## 2021-09-13 RX ORDER — CARVEDILOL 6.25 MG/1
6.25 TABLET ORAL
COMMUNITY
Start: 2021-03-01 | End: 2022-02-07

## 2021-09-13 RX ORDER — ACETAMINOPHEN 325 MG/1
325-650 TABLET ORAL EVERY 6 HOURS PRN
COMMUNITY

## 2021-09-13 RX ORDER — NIFEDIPINE 30 MG/1
90 TABLET, EXTENDED RELEASE ORAL
COMMUNITY
Start: 2021-04-22 | End: 2022-02-07

## 2021-09-13 ASSESSMENT — CONF VISUAL FIELD
METHOD: COUNTING FINGERS
OS_NORMAL: 1
OD_NORMAL: 1

## 2021-09-13 ASSESSMENT — VISUAL ACUITY
CORRECTION_TYPE: GLASSES
OD_CC: 20/25
METHOD: SNELLEN - LINEAR
OS_CC: 20/20

## 2021-09-13 ASSESSMENT — TONOMETRY
OD_IOP_MMHG: 09
OS_IOP_MMHG: 10
IOP_METHOD: ICARE

## 2021-09-13 ASSESSMENT — MARGIN REFLEX DISTANCE
OD_MRD2: 5
OS_MRD2: 6
OD_MRD1: 2
OS_MRD1: 4

## 2021-09-13 ASSESSMENT — LAGOPHTHALMOS
OS_LAGOPHTHALMOS: 0
OD_LAGOPHTHALMOS: 0

## 2021-09-13 ASSESSMENT — EXTERNAL EXAM - LEFT EYE: OS_EXAM: NORMAL

## 2021-09-13 ASSESSMENT — LEVATOR FUNCTION
OD_LEVATOR: 17
OS_LEVATOR: 19

## 2021-09-13 ASSESSMENT — EXTERNAL EXAM - RIGHT EYE: OD_EXAM: NORMAL

## 2021-09-13 NOTE — NURSING NOTE
Chief Complaints and History of Present Illnesses   Patient presents with     Consult For     RUL drooping     Chief Complaint(s) and History of Present Illness(es)     Consult For     Laterality: right eye    Associated symptoms: Negative for eye pain, dryness and tearing    Treatments tried: artificial tears    Pain scale: 0/10    Comments: RUL drooping              Comments     Pt notes that he has noticed possibly BUL drooping in the last few years, RUL>MEMO, he had a regular check at eye doctor and they told him to come in to be reevaluated.  Using brows or forehead to open wider.     Anay GREY September 13, 2021 7:37 AM

## 2021-09-13 NOTE — TELEPHONE ENCOUNTER
Spoke with patient to schedule surgery with Dr. Breen    Surgery was scheduled on 10/6 at [facility]  Patient will have H&P at [facility]      Patient is aware a COVID-19 test is needed before their procedure. The test should be with-in 4 days of their procedure.   Test Details: Date 10/4  Location: [facility]    Post-Op visit was scheduled on 10/18  Patient is aware a / is needed day of surgery.   Surgery packet was mailed 9/13, patient has my direct contact information for any further questions.   .

## 2021-09-13 NOTE — PROGRESS NOTES
Chief Complaints and History of Present Illnesses   Patient presents with     Consult For     RUL drooping     Chief Complaint(s) and History of Present Illness(es)     Consult For     In right eye.  Associated symptoms include Negative for eye pain, dryness   and tearing.  Treatments tried include artificial tears.  Pain was noted   as 0/10. Additional comments: RUL drooping              Comments     Pt notes that he has noticed possibly BUL drooping in the last few years,   RUL>MEMO, he had a regular check at eye doctor and they told him to come in   to be reevaluated.  Using brows or forehead to open wider.     Anayjose Tompkins COT September 13, 2021 7:37 AM           S/p Bilateral upper eyelid congenital ptosis repair by external levator resection by Dr. Breen in 2007.           FUNCTIONAL COMPLAINTS RELATED TO DROOPY EYELIDS/BROWS:  Ana Ch describes upper lids interfering with superior visual field and interfering with activities of daily living including reading, driving and watching television.     EXAM:   Dominant eye left    MRD1: Right eye 2   Left eye 4      VISUAL FIELD:  Right eye untaped:5 degrees Right eye taped:50 degrees  Left eye untaped:40 degrees Left eye taped:50 degrees    Right eye visual field improves by: 45 degrees  Left eye visual field improves by: 10 degrees        Assessment & Plan     Ana Ch is a 31 year old male with the following diagnoses:   1. Congenital ptosis of eyelid         S/p Bilateral upper eyelid congenital ptosis repair by external levator resection by Dr. Breen in 2007.     - pt happy with left upper eyelid position, but has noticed drooping of right upper eyelid drooping over the past 3-4 years  - r/b/a surgery discussed and pt would like to proceed  + gail response, discussed the need for possible bilateral surgery  - will plan for right upper eyelid ptosis repair --  external levator based on response to phenylephrine           Taylor Almonte,  MD  Oculoplastics Fellow    Today with Ana Ch, I reviewed the indications, risks, benefits, and alternatives of the proposed surgical procedure including, but not limited to, failure obtain the desired result  and need for additional surgery, bleeding, infection, loss of vision, loss of the eye, and the remote possibility of permanent damage to any organ system or death with the use of anesthesia.  I provided multiple opportunities for the questions, answered all questions to the best of my ability, and confirmed that my answers and my discussion were understood.     - Hair Breen MD 8:14 AM 9/13/2021    Attending Physician Attestation:  Complete documentation of historical and exam elements from today's encounter can be found in the full encounter summary report (not reduplicated in this progress note).  I personally obtained the chief complaint(s) and history of present illness.  I confirmed and edited as necessary the review of systems, past medical/surgical history, family history, social history, and examination findings as documented by others; and I examined the patient myself.  I personally reviewed the relevant tests, images, and reports as documented above.  I formulated and edited as necessary the assessment and plan and discussed the findings and management plan with the patient and family.  I personally reviewed the ophthalmic test(s) associated with this encounter, agree with the interpretation(s) as documented by the resident/fellow, and have edited the corresponding report(s) as necessary.   -Hair Breen MD

## 2021-09-13 NOTE — PATIENT INSTRUCTIONS
"Ptosis (Drooping Eyelids)    Eyelid ptosis (pronounced \"millie-sis\") is a condition in which the upper eyelid droops or sags. It can affect one or both eyes. Sometimes the eyelid droops enough to obstruct the upper field of vision and/or side vision, requiring correction.??Ptosis Repair is a surgical procedure that can correct drooping eyelid(s). Depending upon the degree and cause, repair involves either resection (shortening) of a muscle in the eyelid or suspension with a muscle of the brow. Typically, the levator muscle (the major muscle responsible for elevating the upper eyelid) is shortened though an incision made along the natural crease of the lid. Excess skin weighing down the eyelid may also be removed.     Congenital Ptosis  Present from birth, the most common cause of congenital ptosis is the improper development of the levator muscle. Children may need tilt their head back or lift their eyelid with a finger to see. They may also develop amblyopia (\"lazy eye\"), strabismus (eyes that are not properly aligned), astigmatism, or blurred vision. Repair for mild to moderate congenital ptosis is generally performed between ages 3 and 5. Severe visual obstruction may require earlier treatment. ??Repair is usually performed in an outpatient surgical facility under general anesthesia so the child will not become anxious or restless during the procedure.     Acquired Ptosis  Most commonly due to age-related weakening of the levator muscle, acquired ptosis may also be caused by injury, trauma, or procedures, such as cataract surgery, which can cause weak tendons to stretch. Acquired ptosis may also be the first sign of some diseases, such as myasthenia gravis (a disorder in which the muscles become weak), or Paolo's syndrome (a neurological condition that indicates injury to part of the sympathetic nervous system).? Ptosis Repair is usually performed in an outpatient surgical facility under anesthesia that induces a " "\"twilight\" state. Sedated consciousness is preferred so that Dr. Breen can accurately adjust the eyelids.     Who Should Perform The Surgery?   When choosing a surgeon to perform ptosis surgery, look for a cosmetic and reconstructive surgeon who specializes in the eyelids, orbit, and tear drain system. Dr. Breen's membership in the American Society of Ophthalmic Plastic and Reconstructive Surgery (ASOPRS) indicates he or she is not only a board certified ophthalmologist who knows the anatomy and structure of the eyelids and orbit, but also has had extensive training in ophthalmic plastic reconstructive and cosmetic surgery.    "

## 2021-09-29 ENCOUNTER — TELEPHONE (OUTPATIENT)
Dept: OPHTHALMOLOGY | Facility: CLINIC | Age: 31
End: 2021-09-29

## 2021-09-29 NOTE — TELEPHONE ENCOUNTER
Health Call Center    Phone Message    May a detailed message be left on voicemail: yes     Reason for Call: Other: Pt is scheduled for surgery with Dr. Breen next week but he has not heard anything regarding the prior authorization yet. He states he did fax something to the clinic last week regarding this and is wondering if there is an update. Please call Pt to discuss. Thank you.     Action Taken: Message routed to:  [facility]    Travel Screening: Not Applicable

## 2021-10-04 ENCOUNTER — TELEPHONE (OUTPATIENT)
Dept: OPHTHALMOLOGY | Facility: CLINIC | Age: 31
End: 2021-10-04

## 2021-10-04 NOTE — TELEPHONE ENCOUNTER
Per epic message patient would like to cancel his eye procedure with Dr. Orozco.    Patient has been removed from the schedule and all post-op follow up appointments have been removed as well.    Called patient to schedule procedure with Dr. Breen, there was no answer.  Left message with my direct line [phone].

## 2021-10-06 ENCOUNTER — HOSPITAL ENCOUNTER (OUTPATIENT)
Facility: AMBULATORY SURGERY CENTER | Age: 31
End: 2021-10-06
Attending: OPHTHALMOLOGY
Payer: COMMERCIAL

## 2021-10-06 DIAGNOSIS — Q10.0 CONGENITAL PTOSIS OF EYELID: ICD-10-CM

## 2021-10-12 NOTE — TELEPHONE ENCOUNTER
Spoke with patient to schedule surgery with Dr. Breen    Surgery was scheduled on 11/17 at [facility]  Patient will have H&P at [facility]     Patient is aware a COVID-19 test is needed before their procedure. The test should be with-in 4 days of their procedure.   Test Details: Date 11/15 Location [facility]    Post-Op visit was scheduled on 12/6  Patient is aware a / is needed day of surgery.   Surgery packet was mailed 10/12, patient has my direct contact information for any further questions.

## 2021-10-16 DIAGNOSIS — Z11.59 ENCOUNTER FOR SCREENING FOR OTHER VIRAL DISEASES: ICD-10-CM

## 2021-11-15 ENCOUNTER — OFFICE VISIT (OUTPATIENT)
Dept: INTERNAL MEDICINE | Facility: CLINIC | Age: 31
End: 2021-11-15
Payer: COMMERCIAL

## 2021-11-15 ENCOUNTER — LAB (OUTPATIENT)
Dept: URGENT CARE | Facility: URGENT CARE | Age: 31
End: 2021-11-15
Attending: OPHTHALMOLOGY
Payer: COMMERCIAL

## 2021-11-15 VITALS
BODY MASS INDEX: 43.82 KG/M2 | HEART RATE: 92 BPM | WEIGHT: 288.2 LBS | TEMPERATURE: 99 F | DIASTOLIC BLOOD PRESSURE: 70 MMHG | SYSTOLIC BLOOD PRESSURE: 124 MMHG

## 2021-11-15 DIAGNOSIS — Z11.59 ENCOUNTER FOR SCREENING FOR OTHER VIRAL DISEASES: ICD-10-CM

## 2021-11-15 DIAGNOSIS — Q10.0 CONGENITAL PTOSIS OF EYELID: ICD-10-CM

## 2021-11-15 DIAGNOSIS — Z01.818 ENCOUNTER FOR PREOPERATIVE ASSESSMENT: Primary | ICD-10-CM

## 2021-11-15 DIAGNOSIS — E66.01 MORBID OBESITY (H): ICD-10-CM

## 2021-11-15 DIAGNOSIS — N18.4 CKD (CHRONIC KIDNEY DISEASE) STAGE 4, GFR 15-29 ML/MIN (H): ICD-10-CM

## 2021-11-15 PROBLEM — N05.1 FOCAL SEGMENTAL GLOMERULOSCLEROSIS: Status: ACTIVE | Noted: 2021-11-15

## 2021-11-15 LAB — SARS-COV-2 RNA RESP QL NAA+PROBE: NEGATIVE

## 2021-11-15 PROCEDURE — 99214 OFFICE O/P EST MOD 30 MIN: CPT | Performed by: INTERNAL MEDICINE

## 2021-11-15 PROCEDURE — U0005 INFEC AGEN DETEC AMPLI PROBE: HCPCS

## 2021-11-15 PROCEDURE — U0003 INFECTIOUS AGENT DETECTION BY NUCLEIC ACID (DNA OR RNA); SEVERE ACUTE RESPIRATORY SYNDROME CORONAVIRUS 2 (SARS-COV-2) (CORONAVIRUS DISEASE [COVID-19]), AMPLIFIED PROBE TECHNIQUE, MAKING USE OF HIGH THROUGHPUT TECHNOLOGIES AS DESCRIBED BY CMS-2020-01-R: HCPCS

## 2021-11-15 NOTE — PROGRESS NOTES
[address] [address]  Phone: [phone]  Primary Provider: Sky Dyer  Pre-op Performing Provider: PRUDENCIO NIELSEN    PREOPERATIVE EVALUATION:  Today's date: 11/15/2021    Ana Ch is a 31 year old male who presents for a preoperative evaluation.    Surgical Information:  Surgery/Procedure: Right upper ptosis repair  Surgery Location: [facility]  Surgeon: Dr. Breen  Surgery Date: 11/17/2021  Time of Surgery: 1:05pm  Where patient plans to recover: At home with family  Fax number for surgical facility:     Type of Anesthesia Anticipated: to be determined    Assessment & Plan     The proposed surgical procedure is considered INTERMEDIATE risk.    Encounter for preoperative assessment  Congenital ptosis of eyelid  Approval given to proceed with proposed procedure without further diagnostic evaluation. He has significant CKD but gets labs checked through his nephrologist [id number]. Reviewed those labs today (last checked ~2 months ago) and found them to be stable over the last 9 months with GFR around 17 consistently during that time. Medications were reviewed in detail today. On the morning of surgery, take all normal medications with a small sip of water. Resume all medications post-operatively at the same doses unless otherwise directed by surgical care team.    CKD (chronic kidney disease) stage 4, GFR 15-29 ml/min (H)  Known issue that I take into account for their medical decisions, no current exacerbations or new concerns. Managed by outside nephrologist.     Morbid obesity (H)  Known issue that I take into account for their medical decisions, no current exacerbations or new concerns. Weight loss would help with HTN control.    RECOMMENDATION:  APPROVAL GIVEN to proceed with proposed procedure, without further diagnostic evaluation.    Subjective   HPI related to upcoming procedure: Ana presents for a pre-op exam.  He has no other complaints. He reports no personal history of cardiac disease. He is able to walk up a flight of stairs without stopping, getting short of breath, or developing chest pain.    Preop Questions 11/15/2021   1. Have you ever had a heart attack or stroke? No   2. Have you ever had surgery on your heart or blood vessels, such as a stent placement, a coronary artery bypass, or surgery on an artery in your head, neck, heart, or legs? No   3. Do you have chest pain with activity? No   4. Do you have a history of  heart failure? No   5. Do you currently have a cold, bronchitis or symptoms of other infection? No   6. Do you have a cough, shortness of breath, or wheezing? No   7. Do you or anyone in your family have previous history of blood clots? No   8. Do you or does anyone in your family have a serious bleeding problem such as prolonged bleeding following surgeries or cuts? No   9. Have you ever had problems with anemia or been told to take iron pills? No   10. Have you had any abnormal blood loss such as black, tarry or bloody stools? No   11. Have you ever had a blood transfusion? No   12. Are you willing to have a blood transfusion if it is medically needed before, during, or after your surgery? Yes   13. Have you or any of your relatives ever had problems with anesthesia? No   14. Do you have sleep apnea, excessive snoring or daytime drowsiness? No   15. Do you have any artifical heart valves or other implanted medical devices like a pacemaker, defibrillator, or continuous glucose monitor? No   16. Do you have artificial joints? No   17. Are you allergic to latex? No     Health Care Directive:  Patient does not have a Health Care Directive or Living Will: Discussed advance care planning with patient; however, patient declined at this time.    Preoperative Review of :   reviewed - controlled substances prescribed by other outside provider(s).    Review of Systems  Constitutional, neuro, ENT,  endocrine, pulmonary, cardiac, gastrointestinal, genitourinary, musculoskeletal, integument and psychiatric systems are negative, except as otherwise noted.    Patient Active Problem List    Diagnosis Date Noted     Focal segmental glomerulosclerosis 11/15/2021     Priority: Medium     Congenital ptosis of eyelid 09/13/2021     Priority: Medium     Added automatically from request for surgery [id number]       HTN (hypertension) 10/02/2020     Priority: Medium     CKD (chronic kidney disease) stage 4, GFR 15-29 ml/min (H) 10/02/2020     Priority: Medium     Gout 03/10/2020     Priority: Medium     Mixed anxiety and depressive disorder 03/10/2020     Priority: Medium     Obesity 03/10/2020     Priority: Medium      History reviewed. No pertinent past medical history.  Past Surgical History:   Procedure Laterality Date     REPAIR PTOSIS Bilateral 2007     Current Outpatient Medications   Medication Sig Dispense Refill     acetaminophen (TYLENOL) 325 MG tablet Take 325-650 mg by mouth every 6 hours as needed       allopurinol (ZYLOPRIM) 100 MG tablet Take 50 mg by mouth       carvedilol (COREG) 6.25 MG tablet Take 6.25 mg by mouth       emtricitabine-tenofovir AF (DESCOVY) 200-25 MG per tablet Take 1 tablet by mouth daily       Melatonin 2.5 MG CAPS        NIFEdipine ER OSMOTIC (PROCARDIA XL) 30 MG 24 hr tablet Take 90 mg by mouth       order for DME Equipment being ordered: right knee brace/sleeve 1 Device 0     sertraline (ZOLOFT) 50 MG tablet Take 50 mg by mouth daily       Allergies   Allergen Reactions     Nifedipine      Needs to have Procardia XL      Social History     Tobacco Use     Smoking status: Never Smoker     Smokeless tobacco: Never Used   Substance Use Topics     Alcohol use: Not on file     History   Drug Use Not on file         Objective   /70   Pulse 92   Temp 99  F (37.2  C) (Tympanic)   Wt 130.7 kg (288 lb 3.2 oz)   BMI 43.82 kg/m      Physical Exam  GENERAL: alert and in no  distress.  EYES: conjunctivae/corneas clear.  RESP: CTAB, no w/r/r.  CV: RRR, no m/r/g.  GI: NT, ND, without rebound tenderness or guarding  MSK: No edema. Moves all four extremities freely.  SKIN: No significant ulcers, lesions, or rashes on the visualized portions of the skin  NEURO: Alert. Oriented.    Recent Labs   Lab Test 10/02/20  2032   HGB 14.3         Renal function panel  Order: [id number]   Ref Range & Units 2 mo ago   Sodium 136 - 145 mmol/L 143    Potassium 3.5 - 5.1 mmol/L 4.0    Chloride 98 - 112 mmol/L 117 High     Carbon Dioxide (CO2) 21 - 32 mmol/L 19 Low     BUN 7 - 24 mg/dL 50 High     Creatinine 0.70 - 1.30 mg/dL 4.30 High     GFR EST NON AFRICAN >60 mL/min 17 Low     eGFR  >60 mL/min 20 Low     Glucose 74 - 106 mg/dL 113 High     Calcium 8.5 - 10.1 mg/dL 9.1    Albumin 3.4 - 5.0 g/dL 3.9    Phosphorus 2.5 - 4.9 mg/dL 5.3 High     Anion Gap 0.0 - 15.0 mmol/L 7.0    Resulting Agency  [facility]       Diagnostics:  No labs were ordered during this visit.   No EKG required, no history of coronary heart disease, significant arrhythmia, peripheral arterial disease or other structural heart disease.    Revised Cardiac Risk Index (RCRI):  The patient has the following serious cardiovascular risks for perioperative complications:   - Serum Creatinine >2.0 mg/dl = 1 point     RCRI Interpretation: 1 point: Class II (low risk - 0.9% complication rate)      Signed Electronically by: Irvin Hernandez MD  Copy of this evaluation report is provided to requesting physician.

## 2021-11-16 ENCOUNTER — ANESTHESIA EVENT (OUTPATIENT)
Dept: SURGERY | Facility: AMBULATORY SURGERY CENTER | Age: 31
End: 2021-11-16
Payer: COMMERCIAL

## 2021-11-17 ENCOUNTER — HOSPITAL ENCOUNTER (OUTPATIENT)
Facility: AMBULATORY SURGERY CENTER | Age: 31
End: 2021-11-17
Attending: OPHTHALMOLOGY
Payer: COMMERCIAL

## 2021-11-17 ENCOUNTER — ANESTHESIA (OUTPATIENT)
Dept: SURGERY | Facility: AMBULATORY SURGERY CENTER | Age: 31
End: 2021-11-17
Payer: COMMERCIAL

## 2021-11-17 VITALS
HEART RATE: 65 BPM | WEIGHT: 288 LBS | SYSTOLIC BLOOD PRESSURE: 132 MMHG | TEMPERATURE: 97.5 F | RESPIRATION RATE: 16 BRPM | DIASTOLIC BLOOD PRESSURE: 71 MMHG | HEIGHT: 68 IN | OXYGEN SATURATION: 99 % | BODY MASS INDEX: 43.65 KG/M2

## 2021-11-17 DIAGNOSIS — H02.401 PTOSIS OF RIGHT EYELID: Primary | ICD-10-CM

## 2021-11-17 DIAGNOSIS — Q10.0 CONGENITAL PTOSIS OF EYELID: ICD-10-CM

## 2021-11-17 PROCEDURE — 67904 REPAIR EYELID DEFECT: CPT | Mod: RT

## 2021-11-17 PROCEDURE — 67904 REPAIR EYELID DEFECT: CPT | Mod: RT | Performed by: OPHTHALMOLOGY

## 2021-11-17 RX ORDER — LIDOCAINE HYDROCHLORIDE AND EPINEPHRINE 10; 10 MG/ML; UG/ML
INJECTION, SOLUTION INFILTRATION; PERINEURAL PRN
Status: DISCONTINUED | OUTPATIENT
Start: 2021-11-17 | End: 2021-11-17 | Stop reason: HOSPADM

## 2021-11-17 RX ORDER — OXYCODONE HYDROCHLORIDE 5 MG/1
5 TABLET ORAL EVERY 4 HOURS PRN
Status: DISCONTINUED | OUTPATIENT
Start: 2021-11-17 | End: 2021-11-18 | Stop reason: HOSPADM

## 2021-11-17 RX ORDER — KETOROLAC TROMETHAMINE 30 MG/ML
15 INJECTION, SOLUTION INTRAMUSCULAR; INTRAVENOUS EVERY 6 HOURS PRN
Status: DISCONTINUED | OUTPATIENT
Start: 2021-11-17 | End: 2021-11-18 | Stop reason: HOSPADM

## 2021-11-17 RX ORDER — OXYCODONE HYDROCHLORIDE 5 MG/1
5 TABLET ORAL EVERY 6 HOURS PRN
Qty: 6 TABLET | Refills: 0 | Status: SHIPPED | OUTPATIENT
Start: 2021-11-17 | End: 2021-11-20

## 2021-11-17 RX ORDER — FENTANYL CITRATE 50 UG/ML
INJECTION, SOLUTION INTRAMUSCULAR; INTRAVENOUS PRN
Status: DISCONTINUED | OUTPATIENT
Start: 2021-11-17 | End: 2021-11-17

## 2021-11-17 RX ORDER — ONDANSETRON 2 MG/ML
4 INJECTION INTRAMUSCULAR; INTRAVENOUS EVERY 30 MIN PRN
Status: DISCONTINUED | OUTPATIENT
Start: 2021-11-17 | End: 2021-11-18 | Stop reason: HOSPADM

## 2021-11-17 RX ORDER — LORAZEPAM 2 MG/ML
.5-1 INJECTION INTRAMUSCULAR
Status: DISCONTINUED | OUTPATIENT
Start: 2021-11-17 | End: 2021-11-17 | Stop reason: HOSPADM

## 2021-11-17 RX ORDER — PROPOFOL 10 MG/ML
INJECTION, EMULSION INTRAVENOUS PRN
Status: DISCONTINUED | OUTPATIENT
Start: 2021-11-17 | End: 2021-11-17

## 2021-11-17 RX ORDER — ERYTHROMYCIN 5 MG/G
OINTMENT OPHTHALMIC PRN
Status: DISCONTINUED | OUTPATIENT
Start: 2021-11-17 | End: 2021-11-17 | Stop reason: HOSPADM

## 2021-11-17 RX ORDER — SODIUM CHLORIDE, SODIUM LACTATE, POTASSIUM CHLORIDE, CALCIUM CHLORIDE 600; 310; 30; 20 MG/100ML; MG/100ML; MG/100ML; MG/100ML
INJECTION, SOLUTION INTRAVENOUS CONTINUOUS PRN
Status: DISCONTINUED | OUTPATIENT
Start: 2021-11-17 | End: 2021-11-17

## 2021-11-17 RX ORDER — SODIUM CHLORIDE, SODIUM LACTATE, POTASSIUM CHLORIDE, CALCIUM CHLORIDE 600; 310; 30; 20 MG/100ML; MG/100ML; MG/100ML; MG/100ML
INJECTION, SOLUTION INTRAVENOUS CONTINUOUS
Status: DISCONTINUED | OUTPATIENT
Start: 2021-11-17 | End: 2021-11-18 | Stop reason: HOSPADM

## 2021-11-17 RX ORDER — ALBUTEROL SULFATE 0.83 MG/ML
2.5 SOLUTION RESPIRATORY (INHALATION) EVERY 4 HOURS PRN
Status: DISCONTINUED | OUTPATIENT
Start: 2021-11-17 | End: 2021-11-17 | Stop reason: HOSPADM

## 2021-11-17 RX ORDER — MEPERIDINE HYDROCHLORIDE 25 MG/ML
12.5 INJECTION INTRAMUSCULAR; INTRAVENOUS; SUBCUTANEOUS
Status: DISCONTINUED | OUTPATIENT
Start: 2021-11-17 | End: 2021-11-18 | Stop reason: HOSPADM

## 2021-11-17 RX ORDER — FENTANYL CITRATE 50 UG/ML
25-50 INJECTION, SOLUTION INTRAMUSCULAR; INTRAVENOUS EVERY 5 MIN PRN
Status: DISCONTINUED | OUTPATIENT
Start: 2021-11-17 | End: 2021-11-17 | Stop reason: HOSPADM

## 2021-11-17 RX ORDER — SODIUM CHLORIDE, SODIUM LACTATE, POTASSIUM CHLORIDE, CALCIUM CHLORIDE 600; 310; 30; 20 MG/100ML; MG/100ML; MG/100ML; MG/100ML
INJECTION, SOLUTION INTRAVENOUS CONTINUOUS
Status: DISCONTINUED | OUTPATIENT
Start: 2021-11-17 | End: 2021-11-17 | Stop reason: HOSPADM

## 2021-11-17 RX ORDER — ACETAMINOPHEN 325 MG/1
975 TABLET ORAL ONCE
Status: COMPLETED | OUTPATIENT
Start: 2021-11-17 | End: 2021-11-17

## 2021-11-17 RX ORDER — FENTANYL CITRATE 50 UG/ML
25 INJECTION, SOLUTION INTRAMUSCULAR; INTRAVENOUS
Status: DISCONTINUED | OUTPATIENT
Start: 2021-11-17 | End: 2021-11-18 | Stop reason: HOSPADM

## 2021-11-17 RX ORDER — ONDANSETRON 4 MG/1
4 TABLET, ORALLY DISINTEGRATING ORAL EVERY 30 MIN PRN
Status: DISCONTINUED | OUTPATIENT
Start: 2021-11-17 | End: 2021-11-18 | Stop reason: HOSPADM

## 2021-11-17 RX ORDER — LIDOCAINE 40 MG/G
CREAM TOPICAL
Status: DISCONTINUED | OUTPATIENT
Start: 2021-11-17 | End: 2021-11-17 | Stop reason: HOSPADM

## 2021-11-17 RX ORDER — ERYTHROMYCIN 5 MG/G
OINTMENT OPHTHALMIC
Qty: 3.5 G | Refills: 0 | Status: SHIPPED | OUTPATIENT
Start: 2021-11-17 | End: 2021-12-06

## 2021-11-17 RX ORDER — TETRACAINE HYDROCHLORIDE 5 MG/ML
SOLUTION OPHTHALMIC PRN
Status: DISCONTINUED | OUTPATIENT
Start: 2021-11-17 | End: 2021-11-17 | Stop reason: HOSPADM

## 2021-11-17 RX ORDER — HYDROMORPHONE HYDROCHLORIDE 1 MG/ML
.2-.4 INJECTION, SOLUTION INTRAMUSCULAR; INTRAVENOUS; SUBCUTANEOUS EVERY 5 MIN PRN
Status: DISCONTINUED | OUTPATIENT
Start: 2021-11-17 | End: 2021-11-17 | Stop reason: HOSPADM

## 2021-11-17 RX ADMIN — FENTANYL CITRATE 50 MCG: 50 INJECTION, SOLUTION INTRAMUSCULAR; INTRAVENOUS at 12:03

## 2021-11-17 RX ADMIN — SODIUM CHLORIDE, SODIUM LACTATE, POTASSIUM CHLORIDE, CALCIUM CHLORIDE: 600; 310; 30; 20 INJECTION, SOLUTION INTRAVENOUS at 12:00

## 2021-11-17 RX ADMIN — ACETAMINOPHEN 975 MG: 325 TABLET ORAL at 10:54

## 2021-11-17 RX ADMIN — PROPOFOL 40 MG: 10 INJECTION, EMULSION INTRAVENOUS at 12:10

## 2021-11-17 ASSESSMENT — MIFFLIN-ST. JEOR: SCORE: 2230.86

## 2021-11-17 NOTE — OP NOTE
PREOPERATIVE DIAGNOSIS: Ptosis, right upper lid.   POSTOPERATIVE DIAGNOSIS:  Ptosis,right upper lid.   PROCEDURE:Right  upper eyelid  ptosis repair by external levator resection.   SURGEON: Meghann Breen MD   ASSISTANT: Taylor Almonte MD and Cy Padgett MD   ANESTHESIA: Monitored with local infiltration of a 50/50 mixture of 2% lidocaine with epinephrine and 0.5% Marcaine.   COMPLICATIONS: None.   ESTIMATED BLOOD LOSS: Less than 5 mL.   HISTORY: Ana Ch  presented with ptosis of right upper lid interfering with the superior visual field and activities of daily living. After the risks, benefits and alternatives to the proposed procedure were explained, informed consent was obtained.   DESCRIPTION OF PROCEDURE: Ana Ch  was brought to the operating room and placed supine on the operating table. Intravenous sedation was given. The right upper lid crease was marked with a marking pen and infiltrated with local anesthetic. The area was prepped and draped in the typical sterile ophthalmic fashion. Attention was directed to the right  side. A lid crease incision was made with a 15 blade and dissection carried down to the orbicularis with high temperature cautery. The orbital septum was opened horizontally. The levator aponeurosis was identified and dissected from the superior tarsal border and the underlying Macias's muscle and advanced with a 5-0 Mersilene suture to bring the lid into a normal height and contour. The suture was passed to partial thickness through the superior tarsal plate and then each end brought underneath the levator aponeurosis. The patient was asked to open the eye and the suture adjusted for height and contour.  The skin was closed with with running 6-0 plain gut sutures.  Ophthalmic ointment was applied to the incision. . The patient tolerated the procedure well and left the operating room in stable condition.     MEGHANN BREEN MD

## 2021-11-17 NOTE — ANESTHESIA POSTPROCEDURE EVALUATION
Patient: Ana Ch    Procedure: Procedure(s):  Right upper ptosis repair       Diagnosis:Congenital ptosis of eyelid [Q10.0]  Diagnosis Additional Information: No value filed.    Anesthesia Type:  MAC    Note:  Disposition: Outpatient   Postop Pain Control: Uneventful            Sign Out: Well controlled pain   PONV: No   Neuro/Psych: Uneventful            Sign Out: Acceptable/Baseline neuro status   Airway/Respiratory: Uneventful            Sign Out: Acceptable/Baseline resp. status   CV/Hemodynamics: Uneventful            Sign Out: Acceptable CV status; No obvious hypovolemia; No obvious fluid overload   Other NRE: NONE   DID A NON-ROUTINE EVENT OCCUR? No           Last vitals:  Vitals Value Taken Time   BP     Temp     Pulse     Resp     SpO2         Electronically Signed By: TIMOTHY GUTIERRES MD  November 17, 2021  1:09 PM

## 2021-11-17 NOTE — BRIEF OP NOTE
[facility] [place]    Brief Operative Note    Pre-operative diagnosis: Congenital ptosis of eyelid [Q10.0]  Post-operative diagnosis Same as pre-operative diagnosis    Procedure: Procedure(s):  Right upper ptosis repair  Surgeon: Surgeon(s) and Role:     * Hair Breen MD - Primary   Taylor Almonte MD - assistant  Anesthesia: Monitor Anesthesia Care   Estimated Blood Loss: Minimal    Drains: None  Specimens: * No specimens in log *  Findings:   as expected.  Complications: None.  Implants: * No surgical log found *

## 2021-11-17 NOTE — DISCHARGE INSTRUCTIONS
Post-operative Instructions    Ophthalmic Plastic and Reconstructive Surgery  Hair Breen M.D.  Taylor Almonte M.D.    All instructions apply to the operated eye(s) or eyelid(s)    What to expect after surgery:    There will be some swelling, bruising, and likely a black eye (even into the lower eyelids and cheeks). Also expect crusting and discharge from the eye and/or incisions.     A small amount of surface bleeding is normal for the first 48 hours after surgery.    You may notice some bloody tears for the first few days after surgery. This is normal.    Your eye(s) and eyelid(s) may be painful and tender. This is normal after surgery. Use the pain medication as prescribed. If your pain does not improve despite the medication, contact the office.    Wound care and personal care:    If a patch or bandage has been placed, please leave this in place until seen in clinic. Prevent the bandage from getting wet.     Apply ice compresses 15 minutes on 15 minutes off while awake for the first 2 days after surgery, then switch to warm compresses 4 times a day until seen by your physician.     For warm packs you can place a cup of dry uncooked rice in a clean cotton sock. Place sock in microwave 30 seconds to one minute. Next place the warm sock into a plastic bag and wrap the bag with clean warm wet washcloth and place over operated eye.      You may shower or wash your hair the day after surgery. Do not bathe or go swimming for 1 week to prevent contamination of your wounds.    Do not apply make-up to the eyes or eyelids for 2 weeks after surgery.    Activity restrictions and driving:    Avoid heavy lifting, bending, exercise or strenuous activity for 1 week after surgery.    You may resume other activities and return to work as tolerated.    You may not resume driving until have you stopped using narcotic pain medications(such as Norco, Percocet, Tylenol #3).    Medications:    Restart all your regular home  medications and eye drops today. If you take Plavix or Aspirin on a regular basis, wait for 3 days after your surgery before restarting these in order to decrease the risk of bleeding complications.    Avoid aspirin and aspirin-like medications (Motrin, Aleve, Ibuprofen, Lala-Oldhams etc) for 5 days to reduce the risk of bleeding. You may take Tylenol (acetaminophen) for pain.    In addition to your home medications, take the following post-operative medications as prescribed by your physician:    Apply antibiotic ointment (erythromycin) to all sutures three times a day, and into the operated eye(s) at night.     Take scheduled extra strength Tylenol for pain.  You may take 1 to 2 pain pills (norco or oxycodone as prescribed) as needed for breakthrough pain up to every 6 hours.    The pain pills may make you drowsy. You must not drive a car, operate heavy machinery or drink alcohol while taking them.    The pain pills may cause constipation and nausea. Take them with some food to prevent a stomach upset. If you continue to experience nausea, call your physician.      WARNING: All the prescription pain medications listed above contain Tylenol (acetaminophen). You must not take more than 4,000 mg of acetaminophen per 24-hour period. This is equivalent to 6 tablets of Darvocet, 8 tablets of Vicodin, or 12 tablets of Norco, Percocet or Tylenol #3. If you take other over-the-counter medications containing acetaminophen, you must take the amount of acetaminophen into account and reduce the number of prescribed pain pills accordingly.    Contact information and follow-up:    Return to the Eye Clinic for a follow-up appointment with your physician as  scheduled. If no appointment has been scheduled, call [phone] for an  appointment with Dr. Breen within 1 to 2 weeks from your date of surgery.  -     Please email a few photos of your eye(s) or other operative site(s) to [email or web address] prior to your follow up  visit.      For severe pain, bleeding, or loss of vision, call the Eye Clinic at [phone].    After hours or on weekends and holidays, call [phone] and ask to speak with the ophthalmologist on call.      [facility]  Home Care Following Anesthesia  For 24 hours after surgery:  1. Get plenty of rest.  A responsible adult must stay with you for at least 24 hours after you leave the surgery center.  2. Do not drive or use heavy equipment.  If you have weakness or tingling, don't drive or use heavy equipment until this feeling goes away.   3. Do not drink alcohol.   4. Avoid strenuous or risky activities.  Ask for help when climbing stairs.  5. You may feel lightheaded.  IF so, sit for a few minutes before standing.  Have someone help you get up.   6. If you have nausea (feel sick to your stomach): Drink only clear liquids such as apple juice, ginger ale, broth or 7-Up.  Rest may also help.  Be sure to drink enough fluids.  Move to a regular diet as you feel able.   7. You may have a slight fever.  Call the doctor if your fever is over 100 F (37.7 C) (taken under the tongue) or lasts longer than 24 hours.  8. You may have a dry mouth, a sore throat, muscle aches or trouble sleeping. These should go away after 24 hours.  9. Do not make important or legal decisions.   10. It is recommended to avoid smoking.               Tips for taking pain medications  To get the best pain relief possible, remember these points:    Take pain medications as directed, before pain becomes severe.    Pain medication can upset your stomach: taking it with food may help.    Constipation is a common side effect of pain medication. Drink plenty of  fluids.    Eat foods high in fiber. Take a stool softener if recommended by your doctor or pharmacist.    Do not drink alcohol, drive or operate machinery while taking pain medications.    Ask about other ways to control pain, such as with heat, ice or  relaxation.    Tylenol/Acetaminophen Consumption  To help encourage the safe use of acetaminophen, the makers of TYLENOL  have lowered the maximum daily dose for single-ingredient Extra Strength TYLENOL  (acetaminophen) products sold in the [place] from 8 pills per day (4,000 mg) to 6 pills per day (3,000 mg). The dosing interval has also changed from 2 pills every 4-6 hours to 2 pills every 6 hours.    If you feel your pain relief is insufficient, you may take Tylenol/Acetaminophen in addition to your narcotic pain medication.     Be careful not to exceed 3,000 mg of Tylenol/Acetaminophen in a 24 hour period from all sources.    If you are taking extra strength Tylenol/acetaminophen (500 mg), the maximum dose is 6 tablets in 24 hours.    If you are taking regular strength acetaminophen (325 mg), the maximum dose is 9 tablets in 24 hours.  975 mg of Tylenol given at 11:00 am next dose may be given after 5:00 pm    Call a doctor for any of the followin. Signs of infection (fever, growing tenderness at the surgery site, a large amount of drainage or bleeding, severe pain, foul-smelling drainage, redness, swelling).  2. It has been over 8 to 10 hours since surgery and you are still not able to urinate (pass water).  3. Headache for over 24 hours.  4. Signs of Covid-19 infection (temperature over 100 degrees, shortness of breath, cough, loss of taste/smell, generalized body aches, persistent headache, chills, sore throat, nausea/vomiting/diarrhea)  Your doctor is:  Dr. Hair Breen, Ophthalmology: [phone]              Or dial [phone] and ask for the resident on call for:  Ophthalmology  For emergency care, call the:  [facility]:  [phone] (TTY for hearing impaired: [phone])

## 2021-11-17 NOTE — ANESTHESIA PREPROCEDURE EVALUATION
Anesthesia Pre-Procedure Evaluation    Patient: Ana Ch   MRN: [id number] : 1990        Preoperative Diagnosis: Congenital ptosis of eyelid [Q10.0]    Procedure : Procedure(s):  Right upper ptosis repair          No past medical history on file.   Past Surgical History:   Procedure Laterality Date     REPAIR PTOSIS Bilateral       Allergies   Allergen Reactions     Nifedipine      Needs to have Procardia XL      Social History     Tobacco Use     Smoking status: Never Smoker     Smokeless tobacco: Never Used   Substance Use Topics     Alcohol use: Not on file      Wt Readings from Last 1 Encounters:   21 130.6 kg (288 lb)        Anesthesia Evaluation            ROS/MED HX  ENT/Pulmonary:       Neurologic:       Cardiovascular:     (+) hypertension-----    METS/Exercise Tolerance:     Hematologic: Comments: Anemia of chronic disease    (+) anemia,     Musculoskeletal: Comment: gout      GI/Hepatic:       Renal/Genitourinary: Comment: CKD stage 4 secondary to focal segmental glomerulosclerosis. Sees transplant team.    (+) renal disease, type: CRI,     Endo:     (+) Obesity,     Psychiatric/Substance Use:     (+) psychiatric history anxiety and depression     Infectious Disease:       Malignancy:       Other:            Physical Exam    Airway        Mallampati: I   TM distance: > 3 FB   Neck ROM: full   Mouth opening: > 3 cm    Respiratory Devices and Support         Dental  no notable dental history         Cardiovascular   cardiovascular exam normal          Pulmonary   pulmonary exam normal                OUTSIDE LABS:  CBC:   Lab Results   Component Value Date    WBC 10.8 10/02/2020    HGB 14.3 10/02/2020    HCT 43.5 10/02/2020     10/02/2020     BMP: No results found for: NA, POTASSIUM, CHLORIDE, CO2, BUN, CR, GLC  COAGS: No results found for: PTT, INR, FIBR  POC: No results found for: BGM, HCG, HCGS  HEPATIC: No results found for: ALBUMIN, PROTTOTAL, ALT, AST, GGT, ALKPHOS,  BILITOTAL, BILIDIRECT, IVETH  OTHER: No results found for: PH, LACT, A1C, JOSHUA, PHOS, MAG, LIPASE, AMYLASE, TSH, T4, T3, CRP, SED    Anesthesia Plan    ASA Status:  3   NPO Status:  NPO Appropriate    Anesthesia Type: MAC.     - Reason for MAC: chronic cardiopulmonary disease, straight local not clinically adequate   Induction: Intravenous.   Maintenance: TIVA.        Consents    Anesthesia Plan(s) and associated risks, benefits, and realistic alternatives discussed. Questions answered and patient/representative(s) expressed understanding.     - Discussed: Risks, Benefits and Alternatives for BOTH SEDATION and the PROCEDURE were discussed     - Discussed with:  Patient      - Extended Intubation/Ventilatory Support Discussed: No.      - Patient is DNR/DNI Status: No    Use of blood products discussed: No .     Postoperative Care    Pain management: IV analgesics, Multi-modal analgesia.   PONV prophylaxis: Background Propofol Infusion, Dexamethasone or Solumedrol, Ondansetron (or other 5HT-3)     Comments:                TIMOTHY GUTIERRES MD

## 2021-12-06 ENCOUNTER — OFFICE VISIT (OUTPATIENT)
Dept: OPHTHALMOLOGY | Facility: CLINIC | Age: 31
End: 2021-12-06
Payer: COMMERCIAL

## 2021-12-06 DIAGNOSIS — Z98.890 POSTOPERATIVE EYE STATE: ICD-10-CM

## 2021-12-06 DIAGNOSIS — Q10.0 CONGENITAL PTOSIS OF EYELID: Primary | ICD-10-CM

## 2021-12-06 PROCEDURE — 99024 POSTOP FOLLOW-UP VISIT: CPT | Performed by: OPHTHALMOLOGY

## 2021-12-06 ASSESSMENT — SLIT LAMP EXAM - LIDS
COMMENTS: INCISIONS CLEAN/DRY/INTACT
COMMENTS: NORMAL

## 2021-12-06 ASSESSMENT — EXTERNAL EXAM - LEFT EYE: OS_EXAM: NORMAL

## 2021-12-06 ASSESSMENT — TONOMETRY
IOP_METHOD: ICARE
OS_IOP_MMHG: 11
OD_IOP_MMHG: 11

## 2021-12-06 ASSESSMENT — EXTERNAL EXAM - RIGHT EYE: OD_EXAM: PERIORBITAL EDEMA

## 2021-12-06 NOTE — NURSING NOTE
"Chief Complaints and History of Present Illnesses   Patient presents with     Post Op (Ophthalmology) Right Eye     POW#2 s/p Right  upper eyelid  ptosis repair by external levator resection     Chief Complaint(s) and History of Present Illness(es)     Post Op (Ophthalmology) Right Eye     Laterality: right eye    Associated symptoms: swelling.  Negative for dryness, burning, discharge, eye pain and itching    Pain scale: 0/10    Comments: POW#2 s/p Right  upper eyelid  ptosis repair by external levator resection              Comments     Pt notes that the RUL is feeling well, wondering if it lifted enough, and if the \"skin will snap back\". Pt discontinued nga 1 week p skye Tompkins COT December 6, 2021 11:04 AM                  "

## 2021-12-06 NOTE — PROGRESS NOTES
"Chief Complaints and History of Present Illnesses   Patient presents with     Post Op (Ophthalmology) Right Eye     POW#2 s/p Right  upper eyelid  ptosis repair by external levator resection     Chief Complaint(s) and History of Present Illness(es)     Post Op (Ophthalmology) Right Eye     In right eye.  Associated symptoms include swelling.  Negative for   dryness, burning, discharge, eye pain and itching.  Pain was noted as   0/10. Additional comments: POW#2 s/p Right  upper eyelid  ptosis repair by   external levator resection              Comments     Pt notes that the RUL is feeling well, wondering if it lifted enough, and   if the \"skin will snap back\". Pt discontinued nga 1 week p sx.     Anay Tompkins COT December 6, 2021 11:04 AM            Assessment & Plan     Ana Ch is a 31 year old male with the following diagnoses:   1. Congenital ptosis of eyelid    2. Postoperative eye state         Continue antibiotic ointment or bland lubricating ointment (eg vaseline or aquaphor) to the incision(s) two times a day.    Gently massage along the incision(s) two times a day.    Use warm soaks over the incision(s) four times a day until swelling and bruises resolve.    Return to clinic 6-8 weeks          Attending Physician Attestation:  Complete documentation of historical and exam elements from today's encounter can be found in the full encounter summary report (not reduplicated in this progress note).  I personally obtained the chief complaint(s) and history of present illness.  I confirmed and edited as necessary the review of systems, past medical/surgical history, family history, social history, and examination findings as documented by others; and I examined the patient myself.  I personally reviewed the relevant tests, images, and reports as documented above.  I formulated and edited as necessary the assessment and plan and discussed the findings and management plan with the patient and family. I " personally reviewed the ophthalmic test(s) associated with this encounter, and have completed the corresponding report(s) as necessary.   -Hair Breen MD

## 2021-12-17 ENCOUNTER — TELEPHONE (OUTPATIENT)
Dept: OPHTHALMOLOGY | Facility: CLINIC | Age: 31
End: 2021-12-17
Payer: COMMERCIAL

## 2021-12-18 NOTE — TELEPHONE ENCOUNTER
LVM for patient regarding rescheduling appointment due to provider is out of clinic. Provided direct number for rescheduling need.

## 2021-12-21 ENCOUNTER — TELEPHONE (OUTPATIENT)
Dept: OPHTHALMOLOGY | Facility: CLINIC | Age: 31
End: 2021-12-21
Payer: COMMERCIAL

## 2021-12-21 NOTE — TELEPHONE ENCOUNTER
Spoke with patient regarding rescheduling appointment due to provider is out of clinic. Rescheduled patient accordingly and sent new appointment reminder to confirmed address.-Per PAtient

## 2021-12-26 ENCOUNTER — HEALTH MAINTENANCE LETTER (OUTPATIENT)
Age: 31
End: 2021-12-26

## 2022-01-24 ENCOUNTER — TELEPHONE (OUTPATIENT)
Dept: OPHTHALMOLOGY | Facility: CLINIC | Age: 32
End: 2022-01-24

## 2022-01-24 ENCOUNTER — OFFICE VISIT (OUTPATIENT)
Dept: OPHTHALMOLOGY | Facility: CLINIC | Age: 32
End: 2022-01-24
Payer: COMMERCIAL

## 2022-01-24 DIAGNOSIS — Z98.890 POSTOPERATIVE EYE STATE: Primary | ICD-10-CM

## 2022-01-24 DIAGNOSIS — H02.421 MYOGENIC PTOSIS OF RIGHT EYELID: ICD-10-CM

## 2022-01-24 DIAGNOSIS — Q10.0 CONGENITAL PTOSIS OF RIGHT UPPER EYELID: ICD-10-CM

## 2022-01-24 PROCEDURE — 99024 POSTOP FOLLOW-UP VISIT: CPT | Mod: GC | Performed by: OPHTHALMOLOGY

## 2022-01-24 PROCEDURE — 92285 EXTERNAL OCULAR PHOTOGRAPHY: CPT | Mod: GC | Performed by: OPHTHALMOLOGY

## 2022-01-24 PROCEDURE — 92081 LIMITED VISUAL FIELD XM: CPT | Mod: GC | Performed by: OPHTHALMOLOGY

## 2022-01-24 ASSESSMENT — EXTERNAL EXAM - RIGHT EYE: OD_EXAM: NORMAL

## 2022-01-24 ASSESSMENT — VISUAL ACUITY
CORRECTION_TYPE: GLASSES
OS_CC: 20/20
OD_CC: 20/20
METHOD: SNELLEN - LINEAR

## 2022-01-24 ASSESSMENT — TONOMETRY
IOP_METHOD: ICARE
OD_IOP_MMHG: 10
OS_IOP_MMHG: 10

## 2022-01-24 ASSESSMENT — EXTERNAL EXAM - LEFT EYE: OS_EXAM: NORMAL

## 2022-01-24 ASSESSMENT — MARGIN REFLEX DISTANCE
OS_MRD1: 4
OD_MRD1: 2.5

## 2022-01-24 ASSESSMENT — LEVATOR FUNCTION
OD_LEVATOR: 17
OS_LEVATOR: 19

## 2022-01-24 ASSESSMENT — LAGOPHTHALMOS
OS_LAGOPHTHALMOS: 0
OD_LAGOPHTHALMOS: 0

## 2022-01-24 NOTE — PROGRESS NOTES
Chief Complaints and History of Present Illnesses   Patient presents with     Follow Up     S/p Right upper eyelid  ptosis repair by external levator resection 11/17/21     Chief Complaint(s) and History of Present Illness(es)     Follow Up     In right eye.  Associated symptoms include dryness.  Negative for eye   pain, discharge and tearing.  Treatments tried include no treatments.    Pain was noted as 0/10. Additional comments: S/p Right upper   eyelid  ptosis repair by external levator resection 11/17/21              Comments     Things are going Ok after surgery. RUL is not raised up as much as was   expecting but question if some swelling of the lid could be part of whats   noticed now. No pain or discomfort.  No longer using the ointment but feeling some dryness and feel line he   might restart. Last use would have been a week ago.     Helga Ch, COT COT 9:09 AM January 24, 2022                 FUNCTIONAL COMPLAINTS RELATED TO DROOPY EYELIDS/BROWS:  Ana Ch describes upper lids interfering with superior visual field and interfering with activities of daily living including reading, driving and watching television.     EXAM:   Dominant eye left    MRD1: Right eye 2   Left eye 4    VISUAL FIELD:  Right eye untaped:35 degrees Right eye taped:55 degrees    Right eye visual field improves by: 20 degrees           Assessment & Plan     Ana Ch is a 31 year old male with the following diagnoses:   1. Postoperative eye state    2. Congenital ptosis of right upper eyelid    3. Myogenic ptosis of right eyelid       S/p Bilateral upper eyelid congenital ptosis repair by external levator resection by Dr. Breen in 2007.     S/p right ptosis repair by external levator resection 11/17/21  - lid height improved, but still low  - pt would like for lid height to be higher    Plan:  - right upper lid ptosis repair - external levator resection + 2mm tarsectomy           Taylor Almonte,  MD  Oculoplastics Fellow    Attending Physician Attestation:  I have seen and examined this patient with the fellow .  I have confirmed and edited as necessary the chief complaint(s), history of present illness, review of systems, relevant history, and examination findings as documented by others.  I have personally reviewed the relevant tests, images, and reports as documented above.  I have confirmed and edited as necessary the assessment and plan and agree with this note.    - Hair Breen MD 9:55 AM 1/24/2022    Today with Ana Ch, I reviewed the indications, risks, benefits, and alternatives of the proposed surgical procedure including, but not limited to, failure obtain the desired result  and need for additional surgery, bleeding, infection, loss of vision, loss of the eye, and the remote possibility of permanent damage to any organ system or death with the use of anesthesia.  I provided multiple opportunities for the questions, answered all questions to the best of my ability, and confirmed that my answers and my discussion were understood.   - Hair Breen MD 9:55 AM 1/24/2022

## 2022-01-24 NOTE — NURSING NOTE
Chief Complaints and History of Present Illnesses   Patient presents with     Follow Up     S/p Right upper eyelid  ptosis repair by external levator resection 11/17/21     Chief Complaint(s) and History of Present Illness(es)     Follow Up     Laterality: right eye    Associated symptoms: dryness.  Negative for eye pain, discharge and tearing    Treatments tried: no treatments    Pain scale: 0/10    Comments: S/p Right upper eyelid  ptosis repair by external levator resection 11/17/21              Comments     Things are going Ok after surgery. RUL is not raised up as much as was expecting but question if some swelling of the lid could be part of whats noticed now. No pain or discomfort.  No longer using the ointment but feeling some dryness and feel line he might restart. Last use would have been a week ago.     Helga Ch, COT COT 9:09 AM January 24, 2022

## 2022-01-24 NOTE — TELEPHONE ENCOUNTER
Met with patient to schedule surgery with Dr. Breen    Surgery was scheduled on 2/9 at [facility]  Patient will have H&P at [facility]     Patient is aware a COVID-19 test is needed before their procedure. The test should be with-in 4 days of their procedure.   Test Details: Date 2/7 Location:  [facility]    Post-Op visit was scheduled on 2/28  Patient is aware a / is needed day of surgery.   Surgery packet was given 2/9, patient has my direct contact information for any further questions.

## 2022-01-28 DIAGNOSIS — Z11.59 ENCOUNTER FOR SCREENING FOR OTHER VIRAL DISEASES: Primary | ICD-10-CM

## 2022-02-07 ENCOUNTER — OFFICE VISIT (OUTPATIENT)
Dept: INTERNAL MEDICINE | Facility: CLINIC | Age: 32
End: 2022-02-07
Payer: COMMERCIAL

## 2022-02-07 ENCOUNTER — LAB (OUTPATIENT)
Dept: URGENT CARE | Facility: URGENT CARE | Age: 32
End: 2022-02-07
Payer: COMMERCIAL

## 2022-02-07 VITALS
HEIGHT: 68 IN | TEMPERATURE: 98.6 F | BODY MASS INDEX: 43.26 KG/M2 | OXYGEN SATURATION: 98 % | WEIGHT: 285.4 LBS | DIASTOLIC BLOOD PRESSURE: 70 MMHG | SYSTOLIC BLOOD PRESSURE: 138 MMHG | HEART RATE: 92 BPM

## 2022-02-07 DIAGNOSIS — I10 PRIMARY HYPERTENSION: ICD-10-CM

## 2022-02-07 DIAGNOSIS — N18.4 CKD (CHRONIC KIDNEY DISEASE) STAGE 4, GFR 15-29 ML/MIN (H): ICD-10-CM

## 2022-02-07 DIAGNOSIS — E66.01 MORBID OBESITY (H): ICD-10-CM

## 2022-02-07 DIAGNOSIS — Z11.59 ENCOUNTER FOR SCREENING FOR OTHER VIRAL DISEASES: ICD-10-CM

## 2022-02-07 DIAGNOSIS — Q10.0 CONGENITAL PTOSIS OF RIGHT UPPER EYELID: Primary | ICD-10-CM

## 2022-02-07 DIAGNOSIS — Z01.818 PREOP GENERAL PHYSICAL EXAM: ICD-10-CM

## 2022-02-07 DIAGNOSIS — N05.1 FOCAL SEGMENTAL GLOMERULOSCLEROSIS: ICD-10-CM

## 2022-02-07 PROCEDURE — U0005 INFEC AGEN DETEC AMPLI PROBE: HCPCS

## 2022-02-07 PROCEDURE — 99214 OFFICE O/P EST MOD 30 MIN: CPT | Performed by: INTERNAL MEDICINE

## 2022-02-07 PROCEDURE — U0003 INFECTIOUS AGENT DETECTION BY NUCLEIC ACID (DNA OR RNA); SEVERE ACUTE RESPIRATORY SYNDROME CORONAVIRUS 2 (SARS-COV-2) (CORONAVIRUS DISEASE [COVID-19]), AMPLIFIED PROBE TECHNIQUE, MAKING USE OF HIGH THROUGHPUT TECHNOLOGIES AS DESCRIBED BY CMS-2020-01-R: HCPCS

## 2022-02-07 RX ORDER — CARVEDILOL 6.25 MG/1
6.25 TABLET ORAL 2 TIMES DAILY WITH MEALS
COMMUNITY
Start: 2022-02-07

## 2022-02-07 RX ORDER — NIFEDIPINE 30 MG/1
90 TABLET, EXTENDED RELEASE ORAL DAILY
COMMUNITY
Start: 2022-02-07

## 2022-02-07 ASSESSMENT — MIFFLIN-ST. JEOR: SCORE: 2214.07

## 2022-02-07 NOTE — H&P (VIEW-ONLY)
[address] [address]  Phone: [phone]  Primary Provider: Sky Dyer  Pre-op Performing Provider: YOUSIF CHAVARRIA      PREOPERATIVE EVALUATION:  Today's date: 2/7/2022    Ana Ch is a 32 year old male who presents for a preoperative evaluation.    Surgical Information:  Surgery/Procedure: right upper eyelid ptosis repair  Surgery Location: [facility]  Surgeon: Dr Breen  Surgery Date: 02/09/2022  Time of Surgery: 925am  Where patient plans to recover: At home with family  Fax number for surgical facility: Note does not need to be faxed, will be available electronically in Epic.    Type of Anesthesia Anticipated: Local with MAC    Assessment & Plan     The proposed surgical procedure is considered LOW risk.    1. Congenital ptosis of right upper eyelid    2. Preop general physical exam    3. Focal segmental glomerulosclerosis    4. CKD (chronic kidney disease) stage 4, GFR 15-29 ml/min (H)    5. Primary hypertension    6. Morbid obesity (H)              Risks and Recommendations:  The patient has the following additional risks and recommendations for perioperative complications:    Cardiovascular:  --No active cardiac issues or symptoms.    Pulmonary:   --No active pulmonary symptoms or diagnoses.  --At risk for obstructive sleep apnea based on height and weight, but no formal diagnosis.  --Observe for desaturations while sedated, provide supplemental oxygen as needed      Medication Instructions:  Patient is to take all scheduled medications on the day of surgery EXCEPT for modifications listed below:   - aspirin: Discontinue aspirin 7-10 days prior to procedure to reduce bleeding risk. It should be resumed postoperatively.     No ASA or NSAIDs within 7 days of surgery.  No fish oil or Vitamin E within 7 days of surgery.       RECOMMENDATION:  APPROVAL GIVEN to proceed with proposed  procedure, without further diagnostic evaluation.                      Subjective     HPI related to upcoming procedure: Congenital ptosis of the right upper eyelid    Preop Questions 2/7/2022   1. Have you ever had a heart attack or stroke? No   2. Have you ever had surgery on your heart or blood vessels, such as a stent placement, a coronary artery bypass, or surgery on an artery in your head, neck, heart, or legs? No   3. Do you have chest pain with activity? No   4. Do you have a history of  heart failure? No   5. Do you currently have a cold, bronchitis or symptoms of other infection? No   6. Do you have a cough, shortness of breath, or wheezing? No   7. Do you or anyone in your family have previous history of blood clots? No   8. Do you or does anyone in your family have a serious bleeding problem such as prolonged bleeding following surgeries or cuts? No   9. Have you ever had problems with anemia or been told to take iron pills? No   10. Have you had any abnormal blood loss such as black, tarry or bloody stools? No   11. Have you ever had a blood transfusion? No   12. Are you willing to have a blood transfusion if it is medically needed before, during, or after your surgery? Yes   13. Have you or any of your relatives ever had problems with anesthesia? No   14. Do you have sleep apnea, excessive snoring or daytime drowsiness? No   15. Do you have any artifical heart valves or other implanted medical devices like a pacemaker, defibrillator, or continuous glucose monitor? No   16. Do you have artificial joints? No   17. Are you allergic to latex? No       Health Care Directive:  Patient does not have a Health Care Directive or Living Will:     Preoperative Review of :   reviewed - controlled substances prescribed by other outside provider(s).      *  No recent infectious illnesses.    *  No recent cardiac or pulmonary issues or symptoms.    *  No problems performing vigorous physical activity, no changes  in exercise tolerance.    *  No personal or family history of anesthesia complications.    *  No personal or family history of bleeding or clotting disorders.       History of chronic kidney disease due to focal segmental glomerulonephritis.  Followed closely by nephrologist (Dr. Ayon at [facility]).  Baseline creatinine 4.0-4.8 over the past year.  Patient is beginning the evaluation for eventual kidney transplant.      Hypertension:  History of hypertension, on medication.  No reported side effects from medications.    Reviewed last 6 BP readings in chart:  BP Readings from Last 6 Encounters:   02/07/22 138/70   11/17/21 132/71   11/15/21 124/70   04/24/21 (!) 170/72   10/02/20 128/86   09/08/19 (!) 150/90     No active cardiac complaints or symptoms with exercise.         Review of Systems  Constitutional, neuro, ENT, endocrine, pulmonary, cardiac, gastrointestinal, genitourinary, musculoskeletal, integument and psychiatric systems are negative, except as otherwise noted.    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     Priority: Medium     Added automatically from request for surgery [id number]       HTN (hypertension) 10/02/2020     Priority: Medium     CKD (chronic kidney disease) stage 4, GFR 15-29 ml/min (H) 10/02/2020     Priority: Medium     Gout 03/10/2020     Priority: Medium     Mixed anxiety and depressive disorder 03/10/2020     Priority: Medium     Obesity 03/10/2020     Priority: Medium      Past Medical History:   Diagnosis Date     Hypertension     Controled with medication     Renal disease      Past Surgical History:   Procedure Laterality Date     REPAIR PTOSIS Bilateral 2007     REPAIR PTOSIS Right 11/17/2021    Procedure: Right upper ptosis repair;  Surgeon: Hair Breen MD;  Location: [facility] OR  "    Current Outpatient Medications   Medication Sig Dispense Refill     acetaminophen (TYLENOL) 325 MG tablet Take 325-650 mg by mouth every 6 hours as needed       allopurinol (ZYLOPRIM) 100 MG tablet Take 50 mg by mouth       carvedilol (COREG) 6.25 MG tablet Take 1 tablet (6.25 mg) by mouth 2 times daily (with meals)       emtricitabine-tenofovir AF (DESCOVY) 200-25 MG per tablet Take 1 tablet by mouth daily       Melatonin 2.5 MG CAPS        NIFEdipine ER OSMOTIC (PROCARDIA XL) 30 MG 24 hr tablet Take 3 tablets (90 mg) by mouth daily       order for DME Equipment being ordered: right knee brace/sleeve 1 Device 0     sertraline (ZOLOFT) 50 MG tablet Take 50 mg by mouth daily         Allergies   Allergen Reactions     Nifedipine      Needs to have osmotic extended release Procardia XL otherwise gets flushing        Social History     Tobacco Use     Smoking status: Never Smoker     Smokeless tobacco: Never Used   Substance Use Topics     Alcohol use: Yes     Comment: One drink per week     Family History   Problem Relation Age of Onset     Glaucoma No family hx of      Macular Degeneration No family hx of      History   Drug Use Unknown         Objective     /70 (BP Location: Right arm, Cuff Size: Adult Large)   Pulse 92   Temp 98.6  F (37  C) (Oral)   Ht 1.727 m (5' 8\")   Wt 129.5 kg (285 lb 6.4 oz)   SpO2 98%   BMI 43.39 kg/m      Physical Exam  GENERAL alert and no distress  EYES:  Normal sclera,conjunctiva, EOMI  HENT: oral and posterior pharynx without lesions or erythema, facies symmetric  NECK: Neck supple. No LAD, without thyroidmegaly.  RESP: Clear to ausculation bilaterally without wheezes or crackles. Normal BS in all fields.  CV: RRR normal S1S2 without murmurs, rubs or gallops.  LYMPH: no cervical lymph adenopathy appreciated  MS: extremities- no gross deformities of the visible extremities noted,   EXT:  no lower extremity edema  PSYCH: Alert and oriented times 3; speech- coherent  SKIN:  " No obvious significant skin lesions on visible portions of face     Recent Labs   Lab Test 10/02/20  2032   HGB 14.3         12/21/2021 Labs at [facility]:  Na+ 140  K+ 4.4  BUN 48  Creat 3.67      Diagnostics:  No labs were ordered during this visit.   No EKG required for low risk surgery (cataract, skin procedure, breast biopsy, etc).  No EKG required, no history of coronary heart disease, significant arrhythmia, peripheral arterial disease or other structural heart disease.    Revised Cardiac Risk Index (RCRI):  The patient has the following serious cardiovascular risks for perioperative complications:   - Serum Creatinine >2.0 mg/dl = 1 point     RCRI Interpretation: 1 point: Class II (low risk - 0.9% complication rate)           Signed Electronically by: Issac Lees MD  Copy of this evaluation report is provided to requesting physician.

## 2022-02-07 NOTE — PATIENT INSTRUCTIONS
PRE-OPERATIVE INSTRUCTIONS:     *  Contact your surgeon if there is any change in your health. This includes signs of new infection, such as cold or flu (such as a sore throat, runny nose, cough, rash or fever).       *  Confirm any Covid testing requirements before your procedure.   Be aware that each surgery facility has their specific Covid testing requirements.  Many surgery facilities require Covid testing within 2-4 days of the surgery.  Typically the surgeon's office is responsible for arranging and completing this testing before surgery.    Follow any instructions you have been given.  Contact the surgery office for questions about this.       --[facility] Covid Scheduling number: [phone]   (to arrange Covid testing and/or Covid vaccine appointments within the [facility] system)      *  Stop aspirin of any kind for 7 days before procedure.   (even low dose daily aspirin).    *  No NSAIDs (Motrin, Advil, ibuprofen, Aleve, etc) within 5-7 days of surgery.    *  Stop any Fish Oil, multi vitamin (and specifically anything containing vitamin E) supplements for 7 days prior to surgery because these can affect platelet function.      *  Tylenol (acetaminophen) OK to take if needed for pain or headache.  Follow instructions on the bottle    *  Prepare your body as instructed by the surgery clinic.  If instructed, Take a shower or bath the night before surgery. Use any special soaps or cleaning instructions according to instructions from your surgeon.  If you do not have soap from your surgeon, use your regular soap. Do not shave or scrub the surgery site.  Wear clean pajamas and have clean sheets on your bed     *  ON THE MORNING OF SURGERY:     --take only Carvedilol with small sip of water    *  Resume all medications at the same doses after surgery, unless instructed otherwise by the medical staff.     *  Attend all follow up appointments with the surgeons (and/or therapists if applicable) as  instructed.     *  Contact the surgeon's office for any specific questions about after-surgery cares and follow up instructions.        You do not need to necessarily return to see us after your surgery unless instructed to do so.        IF YOU REQUIRE NARCOTIC PAIN MEDICATION AFTER YOUR SURGERY:    --Take the narcotic pain medication exactly as prescribed, and use the absolute lowest dose needed for pain control.   The goal of pain medication is not complete pain relief, aim to just make it manageable.    --Beware of drowsiness, nausea, vomiting, when taking this medication.  Do not drive, or operate dangerous equipment after taking this.    --The main side effects from narcotic pain medication can also include intestinal side effects including nausea, vomiting, constipation, or diarrhea.    --If your pain is not able to be controlled with the pain medication supplied to you, contact the surgeons because uncontrolled pain can be a sign of possible surgical complications.    --In case of constipation from pain medications, take over the counter Miralax powder or stool softner Senokot.  If you have a history of constipation with narcotic pain medication, consider taking Miralax powder on any day that you take a pain tablet.

## 2022-02-07 NOTE — PROGRESS NOTES
[address] [address]  Phone: [phone]  Primary Provider: Sky Dyer  Pre-op Performing Provider: YOUSIF CHAVARRIA      PREOPERATIVE EVALUATION:  Today's date: 2/7/2022    Ana Ch is a 32 year old male who presents for a preoperative evaluation.    Surgical Information:  Surgery/Procedure: right upper eyelid ptosis repair  Surgery Location: [facility]  Surgeon: Dr Breen  Surgery Date: 02/09/2022  Time of Surgery: 925am  Where patient plans to recover: At home with family  Fax number for surgical facility: Note does not need to be faxed, will be available electronically in Epic.    Type of Anesthesia Anticipated: Local with MAC    Assessment & Plan     The proposed surgical procedure is considered LOW risk.    1. Congenital ptosis of right upper eyelid    2. Preop general physical exam    3. Focal segmental glomerulosclerosis    4. CKD (chronic kidney disease) stage 4, GFR 15-29 ml/min (H)    5. Primary hypertension    6. Morbid obesity (H)              Risks and Recommendations:  The patient has the following additional risks and recommendations for perioperative complications:    Cardiovascular:  --No active cardiac issues or symptoms.    Pulmonary:   --No active pulmonary symptoms or diagnoses.  --At risk for obstructive sleep apnea based on height and weight, but no formal diagnosis.  --Observe for desaturations while sedated, provide supplemental oxygen as needed      Medication Instructions:  Patient is to take all scheduled medications on the day of surgery EXCEPT for modifications listed below:   - aspirin: Discontinue aspirin 7-10 days prior to procedure to reduce bleeding risk. It should be resumed postoperatively.     No ASA or NSAIDs within 7 days of surgery.  No fish oil or Vitamin E within 7 days of surgery.       RECOMMENDATION:  APPROVAL GIVEN to proceed with proposed  procedure, without further diagnostic evaluation.                      Subjective     HPI related to upcoming procedure: Congenital ptosis of the right upper eyelid    Preop Questions 2/7/2022   1. Have you ever had a heart attack or stroke? No   2. Have you ever had surgery on your heart or blood vessels, such as a stent placement, a coronary artery bypass, or surgery on an artery in your head, neck, heart, or legs? No   3. Do you have chest pain with activity? No   4. Do you have a history of  heart failure? No   5. Do you currently have a cold, bronchitis or symptoms of other infection? No   6. Do you have a cough, shortness of breath, or wheezing? No   7. Do you or anyone in your family have previous history of blood clots? No   8. Do you or does anyone in your family have a serious bleeding problem such as prolonged bleeding following surgeries or cuts? No   9. Have you ever had problems with anemia or been told to take iron pills? No   10. Have you had any abnormal blood loss such as black, tarry or bloody stools? No   11. Have you ever had a blood transfusion? No   12. Are you willing to have a blood transfusion if it is medically needed before, during, or after your surgery? Yes   13. Have you or any of your relatives ever had problems with anesthesia? No   14. Do you have sleep apnea, excessive snoring or daytime drowsiness? No   15. Do you have any artifical heart valves or other implanted medical devices like a pacemaker, defibrillator, or continuous glucose monitor? No   16. Do you have artificial joints? No   17. Are you allergic to latex? No       Health Care Directive:  Patient does not have a Health Care Directive or Living Will:     Preoperative Review of :   reviewed - controlled substances prescribed by other outside provider(s).      *  No recent infectious illnesses.    *  No recent cardiac or pulmonary issues or symptoms.    *  No problems performing vigorous physical activity, no changes  in exercise tolerance.    *  No personal or family history of anesthesia complications.    *  No personal or family history of bleeding or clotting disorders.       History of chronic kidney disease due to focal segmental glomerulonephritis.  Followed closely by nephrologist (Dr. Ayon at [facility]).  Baseline creatinine 4.0-4.8 over the past year.  Patient is beginning the evaluation for eventual kidney transplant.      Hypertension:  History of hypertension, on medication.  No reported side effects from medications.    Reviewed last 6 BP readings in chart:  BP Readings from Last 6 Encounters:   02/07/22 138/70   11/17/21 132/71   11/15/21 124/70   04/24/21 (!) 170/72   10/02/20 128/86   09/08/19 (!) 150/90     No active cardiac complaints or symptoms with exercise.         Review of Systems  Constitutional, neuro, ENT, endocrine, pulmonary, cardiac, gastrointestinal, genitourinary, musculoskeletal, integument and psychiatric systems are negative, except as otherwise noted.    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     Priority: Medium     Added automatically from request for surgery [id number]       HTN (hypertension) 10/02/2020     Priority: Medium     CKD (chronic kidney disease) stage 4, GFR 15-29 ml/min (H) 10/02/2020     Priority: Medium     Gout 03/10/2020     Priority: Medium     Mixed anxiety and depressive disorder 03/10/2020     Priority: Medium     Obesity 03/10/2020     Priority: Medium      Past Medical History:   Diagnosis Date     Hypertension     Controled with medication     Renal disease      Past Surgical History:   Procedure Laterality Date     REPAIR PTOSIS Bilateral 2007     REPAIR PTOSIS Right 11/17/2021    Procedure: Right upper ptosis repair;  Surgeon: Hair Breen MD;  Location: [facility] OR  "    Current Outpatient Medications   Medication Sig Dispense Refill     acetaminophen (TYLENOL) 325 MG tablet Take 325-650 mg by mouth every 6 hours as needed       allopurinol (ZYLOPRIM) 100 MG tablet Take 50 mg by mouth       carvedilol (COREG) 6.25 MG tablet Take 1 tablet (6.25 mg) by mouth 2 times daily (with meals)       emtricitabine-tenofovir AF (DESCOVY) 200-25 MG per tablet Take 1 tablet by mouth daily       Melatonin 2.5 MG CAPS        NIFEdipine ER OSMOTIC (PROCARDIA XL) 30 MG 24 hr tablet Take 3 tablets (90 mg) by mouth daily       order for DME Equipment being ordered: right knee brace/sleeve 1 Device 0     sertraline (ZOLOFT) 50 MG tablet Take 50 mg by mouth daily         Allergies   Allergen Reactions     Nifedipine      Needs to have osmotic extended release Procardia XL otherwise gets flushing        Social History     Tobacco Use     Smoking status: Never Smoker     Smokeless tobacco: Never Used   Substance Use Topics     Alcohol use: Yes     Comment: One drink per week     Family History   Problem Relation Age of Onset     Glaucoma No family hx of      Macular Degeneration No family hx of      History   Drug Use Unknown         Objective     /70 (BP Location: Right arm, Cuff Size: Adult Large)   Pulse 92   Temp 98.6  F (37  C) (Oral)   Ht 1.727 m (5' 8\")   Wt 129.5 kg (285 lb 6.4 oz)   SpO2 98%   BMI 43.39 kg/m      Physical Exam  GENERAL alert and no distress  EYES:  Normal sclera,conjunctiva, EOMI  HENT: oral and posterior pharynx without lesions or erythema, facies symmetric  NECK: Neck supple. No LAD, without thyroidmegaly.  RESP: Clear to ausculation bilaterally without wheezes or crackles. Normal BS in all fields.  CV: RRR normal S1S2 without murmurs, rubs or gallops.  LYMPH: no cervical lymph adenopathy appreciated  MS: extremities- no gross deformities of the visible extremities noted,   EXT:  no lower extremity edema  PSYCH: Alert and oriented times 3; speech- coherent  SKIN:  " No obvious significant skin lesions on visible portions of face     Recent Labs   Lab Test 10/02/20  2032   HGB 14.3         12/21/2021 Labs at [facility]:  Na+ 140  K+ 4.4  BUN 48  Creat 3.67      Diagnostics:  No labs were ordered during this visit.   No EKG required for low risk surgery (cataract, skin procedure, breast biopsy, etc).  No EKG required, no history of coronary heart disease, significant arrhythmia, peripheral arterial disease or other structural heart disease.    Revised Cardiac Risk Index (RCRI):  The patient has the following serious cardiovascular risks for perioperative complications:   - Serum Creatinine >2.0 mg/dl = 1 point     RCRI Interpretation: 1 point: Class II (low risk - 0.9% complication rate)           Signed Electronically by: Issac Lees MD  Copy of this evaluation report is provided to requesting physician.

## 2022-02-08 ENCOUNTER — ANESTHESIA EVENT (OUTPATIENT)
Dept: SURGERY | Facility: AMBULATORY SURGERY CENTER | Age: 32
End: 2022-02-08
Payer: COMMERCIAL

## 2022-02-08 LAB — SARS-COV-2 RNA RESP QL NAA+PROBE: NEGATIVE

## 2022-02-09 ENCOUNTER — ANESTHESIA (OUTPATIENT)
Dept: SURGERY | Facility: AMBULATORY SURGERY CENTER | Age: 32
End: 2022-02-09
Payer: COMMERCIAL

## 2022-02-09 ENCOUNTER — HOSPITAL ENCOUNTER (OUTPATIENT)
Facility: AMBULATORY SURGERY CENTER | Age: 32
End: 2022-02-09
Attending: OPHTHALMOLOGY
Payer: COMMERCIAL

## 2022-02-09 VITALS
DIASTOLIC BLOOD PRESSURE: 76 MMHG | HEIGHT: 68 IN | RESPIRATION RATE: 16 BRPM | BODY MASS INDEX: 43.19 KG/M2 | TEMPERATURE: 98 F | WEIGHT: 285 LBS | OXYGEN SATURATION: 96 % | SYSTOLIC BLOOD PRESSURE: 130 MMHG | HEART RATE: 72 BPM

## 2022-02-09 DIAGNOSIS — Q10.0 CONGENITAL PTOSIS OF EYELID: Primary | ICD-10-CM

## 2022-02-09 PROCEDURE — 67904 REPAIR EYELID DEFECT: CPT | Mod: 78 | Performed by: OPHTHALMOLOGY

## 2022-02-09 PROCEDURE — 67904 REPAIR EYELID DEFECT: CPT | Mod: RT

## 2022-02-09 RX ORDER — FENTANYL CITRATE 50 UG/ML
INJECTION, SOLUTION INTRAMUSCULAR; INTRAVENOUS PRN
Status: DISCONTINUED | OUTPATIENT
Start: 2022-02-09 | End: 2022-02-09

## 2022-02-09 RX ORDER — NEOMYCIN POLYMYXIN B SULFATES AND DEXAMETHASONE 3.5; 10000; 1 MG/ML; [USP'U]/ML; MG/ML
1 SUSPENSION/ DROPS OPHTHALMIC 4 TIMES DAILY
Qty: 3 ML | Refills: 0 | Status: SHIPPED | OUTPATIENT
Start: 2022-02-09 | End: 2022-02-28

## 2022-02-09 RX ORDER — FENTANYL CITRATE 50 UG/ML
25 INJECTION, SOLUTION INTRAMUSCULAR; INTRAVENOUS
Status: DISCONTINUED | OUTPATIENT
Start: 2022-02-09 | End: 2022-02-10 | Stop reason: HOSPADM

## 2022-02-09 RX ORDER — GABAPENTIN 300 MG/1
300 CAPSULE ORAL
Status: DISCONTINUED | OUTPATIENT
Start: 2022-02-09 | End: 2022-02-10 | Stop reason: HOSPADM

## 2022-02-09 RX ORDER — SODIUM CHLORIDE, SODIUM LACTATE, POTASSIUM CHLORIDE, CALCIUM CHLORIDE 600; 310; 30; 20 MG/100ML; MG/100ML; MG/100ML; MG/100ML
INJECTION, SOLUTION INTRAVENOUS CONTINUOUS
Status: DISCONTINUED | OUTPATIENT
Start: 2022-02-09 | End: 2022-02-10 | Stop reason: HOSPADM

## 2022-02-09 RX ORDER — ONDANSETRON 4 MG/1
4 TABLET, ORALLY DISINTEGRATING ORAL EVERY 30 MIN PRN
Status: DISCONTINUED | OUTPATIENT
Start: 2022-02-09 | End: 2022-02-10 | Stop reason: HOSPADM

## 2022-02-09 RX ORDER — MEPERIDINE HYDROCHLORIDE 25 MG/ML
12.5 INJECTION INTRAMUSCULAR; INTRAVENOUS; SUBCUTANEOUS
Status: DISCONTINUED | OUTPATIENT
Start: 2022-02-09 | End: 2022-02-10 | Stop reason: HOSPADM

## 2022-02-09 RX ORDER — ONDANSETRON 2 MG/ML
4 INJECTION INTRAMUSCULAR; INTRAVENOUS EVERY 30 MIN PRN
Status: DISCONTINUED | OUTPATIENT
Start: 2022-02-09 | End: 2022-02-10 | Stop reason: HOSPADM

## 2022-02-09 RX ORDER — HYDRALAZINE HYDROCHLORIDE 20 MG/ML
2.5-5 INJECTION INTRAMUSCULAR; INTRAVENOUS EVERY 10 MIN PRN
Status: DISCONTINUED | OUTPATIENT
Start: 2022-02-09 | End: 2022-02-10 | Stop reason: HOSPADM

## 2022-02-09 RX ORDER — PROPOFOL 10 MG/ML
INJECTION, EMULSION INTRAVENOUS PRN
Status: DISCONTINUED | OUTPATIENT
Start: 2022-02-09 | End: 2022-02-09

## 2022-02-09 RX ORDER — OXYCODONE HYDROCHLORIDE 5 MG/1
5 TABLET ORAL EVERY 4 HOURS PRN
Status: DISCONTINUED | OUTPATIENT
Start: 2022-02-09 | End: 2022-02-10 | Stop reason: HOSPADM

## 2022-02-09 RX ORDER — METOPROLOL TARTRATE 1 MG/ML
1-2 INJECTION, SOLUTION INTRAVENOUS EVERY 5 MIN PRN
Status: DISCONTINUED | OUTPATIENT
Start: 2022-02-09 | End: 2022-02-10 | Stop reason: HOSPADM

## 2022-02-09 RX ORDER — SODIUM CHLORIDE, SODIUM LACTATE, POTASSIUM CHLORIDE, CALCIUM CHLORIDE 600; 310; 30; 20 MG/100ML; MG/100ML; MG/100ML; MG/100ML
INJECTION, SOLUTION INTRAVENOUS CONTINUOUS PRN
Status: DISCONTINUED | OUTPATIENT
Start: 2022-02-09 | End: 2022-02-09

## 2022-02-09 RX ORDER — LIDOCAINE HYDROCHLORIDE 20 MG/ML
INJECTION, SOLUTION INFILTRATION; PERINEURAL PRN
Status: DISCONTINUED | OUTPATIENT
Start: 2022-02-09 | End: 2022-02-09

## 2022-02-09 RX ORDER — OXYCODONE HYDROCHLORIDE 5 MG/1
5 TABLET ORAL EVERY 6 HOURS PRN
Qty: 12 TABLET | Refills: 0 | Status: SHIPPED | OUTPATIENT
Start: 2022-02-09 | End: 2022-02-12

## 2022-02-09 RX ORDER — ERYTHROMYCIN 5 MG/G
OINTMENT OPHTHALMIC
Qty: 3.5 G | Refills: 0 | Status: SHIPPED | OUTPATIENT
Start: 2022-02-09 | End: 2022-02-28

## 2022-02-09 RX ORDER — ERYTHROMYCIN 5 MG/G
OINTMENT OPHTHALMIC PRN
Status: DISCONTINUED | OUTPATIENT
Start: 2022-02-09 | End: 2022-02-09 | Stop reason: HOSPADM

## 2022-02-09 RX ORDER — TETRACAINE HYDROCHLORIDE 5 MG/ML
SOLUTION OPHTHALMIC PRN
Status: DISCONTINUED | OUTPATIENT
Start: 2022-02-09 | End: 2022-02-09 | Stop reason: HOSPADM

## 2022-02-09 RX ORDER — LIDOCAINE 40 MG/G
CREAM TOPICAL
Status: DISCONTINUED | OUTPATIENT
Start: 2022-02-09 | End: 2022-02-10 | Stop reason: HOSPADM

## 2022-02-09 RX ORDER — FENTANYL CITRATE 50 UG/ML
25 INJECTION, SOLUTION INTRAMUSCULAR; INTRAVENOUS EVERY 5 MIN PRN
Status: DISCONTINUED | OUTPATIENT
Start: 2022-02-09 | End: 2022-02-10 | Stop reason: HOSPADM

## 2022-02-09 RX ORDER — LIDOCAINE HYDROCHLORIDE AND EPINEPHRINE 10; 10 MG/ML; UG/ML
INJECTION, SOLUTION INFILTRATION; PERINEURAL PRN
Status: DISCONTINUED | OUTPATIENT
Start: 2022-02-09 | End: 2022-02-09 | Stop reason: HOSPADM

## 2022-02-09 RX ORDER — HYDROMORPHONE HYDROCHLORIDE 1 MG/ML
0.2 INJECTION, SOLUTION INTRAMUSCULAR; INTRAVENOUS; SUBCUTANEOUS EVERY 5 MIN PRN
Status: DISCONTINUED | OUTPATIENT
Start: 2022-02-09 | End: 2022-02-10 | Stop reason: HOSPADM

## 2022-02-09 RX ORDER — ACETAMINOPHEN 325 MG/1
975 TABLET ORAL ONCE
Status: DISCONTINUED | OUTPATIENT
Start: 2022-02-09 | End: 2022-02-10 | Stop reason: HOSPADM

## 2022-02-09 RX ADMIN — SODIUM CHLORIDE, SODIUM LACTATE, POTASSIUM CHLORIDE, CALCIUM CHLORIDE: 600; 310; 30; 20 INJECTION, SOLUTION INTRAVENOUS at 10:09

## 2022-02-09 RX ADMIN — LIDOCAINE HYDROCHLORIDE 60 MG: 20 INJECTION, SOLUTION INFILTRATION; PERINEURAL at 10:16

## 2022-02-09 RX ADMIN — PROPOFOL 20 MG: 10 INJECTION, EMULSION INTRAVENOUS at 10:17

## 2022-02-09 RX ADMIN — FENTANYL CITRATE 25 MCG: 50 INJECTION, SOLUTION INTRAMUSCULAR; INTRAVENOUS at 10:43

## 2022-02-09 RX ADMIN — OXYCODONE HYDROCHLORIDE 5 MG: 5 TABLET ORAL at 11:08

## 2022-02-09 RX ADMIN — FENTANYL CITRATE 25 MCG: 50 INJECTION, SOLUTION INTRAMUSCULAR; INTRAVENOUS at 10:33

## 2022-02-09 RX ADMIN — PROPOFOL 60 MG: 10 INJECTION, EMULSION INTRAVENOUS at 10:16

## 2022-02-09 RX ADMIN — LIDOCAINE HYDROCHLORIDE 10 MG: 20 INJECTION, SOLUTION INFILTRATION; PERINEURAL at 10:46

## 2022-02-09 RX ADMIN — FENTANYL CITRATE 50 MCG: 50 INJECTION, SOLUTION INTRAMUSCULAR; INTRAVENOUS at 10:14

## 2022-02-09 RX ADMIN — LIDOCAINE HYDROCHLORIDE 20 MG: 20 INJECTION, SOLUTION INFILTRATION; PERINEURAL at 10:43

## 2022-02-09 ASSESSMENT — MIFFLIN-ST. JEOR: SCORE: 2212.25

## 2022-02-09 NOTE — ANESTHESIA PREPROCEDURE EVALUATION
Anesthesia Pre-Procedure Evaluation    Patient: Ana Ch   MRN: [id number] : 1990        Preoperative Diagnosis: Congenital ptosis of right upper eyelid [Q10.0]    Procedure : Procedure(s):  right upper eyelid ptosis repair          Past Medical History:   Diagnosis Date     Hypertension     Controled with medication     Renal disease       Past Surgical History:   Procedure Laterality Date     REPAIR PTOSIS Bilateral      REPAIR PTOSIS Right 2021    Procedure: Right upper ptosis repair;  Surgeon: Hair Breen MD;  Location: [facility] OR      Allergies   Allergen Reactions     Nifedipine      Needs to have osmotic extended release Procardia XL otherwise gets flushing      Social History     Tobacco Use     Smoking status: Never Smoker     Smokeless tobacco: Never Used   Substance Use Topics     Alcohol use: Yes     Comment: One drink per week      Wt Readings from Last 1 Encounters:   22 129.3 kg (285 lb)        Anesthesia Evaluation            ROS/MED HX  ENT/Pulmonary:       Neurologic:       Cardiovascular:     (+) hypertension-----    METS/Exercise Tolerance:     Hematologic: Comments: Anemia of chronic disease    (+) anemia,     Musculoskeletal: Comment: gout      GI/Hepatic:       Renal/Genitourinary: Comment: CKD stage 4 secondary to focal segmental glomerulosclerosis. Sees transplant team.    (+) renal disease, type: CRI,     Endo:     (+) Obesity,     Psychiatric/Substance Use:     (+) psychiatric history anxiety and depression     Infectious Disease:       Malignancy:       Other:            Physical Exam    Airway  airway exam normal           Respiratory Devices and Support         Dental  no notable dental history         Cardiovascular   cardiovascular exam normal          Pulmonary   pulmonary exam normal                OUTSIDE LABS:  CBC:   Lab Results   Component Value Date    WBC 10.8 10/02/2020    HGB 14.3 10/02/2020    HCT 43.5 10/02/2020     10/02/2020      BMP: No results found for: NA, POTASSIUM, CHLORIDE, CO2, BUN, CR, GLC  COAGS: No results found for: PTT, INR, FIBR  POC: No results found for: BGM, HCG, HCGS  HEPATIC: No results found for: ALBUMIN, PROTTOTAL, ALT, AST, GGT, ALKPHOS, BILITOTAL, BILIDIRECT, IVETH  OTHER: No results found for: PH, LACT, A1C, JOSHUA, PHOS, MAG, LIPASE, AMYLASE, TSH, T4, T3, CRP, SED    Anesthesia Plan    ASA Status:  3   NPO Status:  NPO Appropriate    Anesthesia Type: MAC.     - Reason for MAC: straight local not clinically adequate   Induction: Intravenous.   Maintenance: TIVA.        Consents    Anesthesia Plan(s) and associated risks, benefits, and realistic alternatives discussed. Questions answered and patient/representative(s) expressed understanding.    - Discussed:     - Discussed with:  Patient      - Extended Intubation/Ventilatory Support Discussed: No.      - Patient is DNR/DNI Status: No    Use of blood products discussed: No .     Postoperative Care    Pain management: Multi-modal analgesia.   PONV prophylaxis: Background Propofol Infusion     Comments:                Garrison Capps MD

## 2022-02-09 NOTE — ANESTHESIA CARE TRANSFER NOTE
Patient: Ana Ch    Procedure: Procedure(s):  right upper eyelid ptosis repair       Diagnosis: Congenital ptosis of right upper eyelid [Q10.0]  Diagnosis Additional Information: No value filed.    Anesthesia Type:   MAC     Note:      Level of Consciousness: awake  Oxygen Supplementation: room air    Independent Airway: airway patency satisfactory and stable        Patient transferred to: Phase II    Handoff Report: Identifed the Patient, Identified the Reponsible Provider, Reviewed the pertinent medical history, Discussed the surgical course, Reviewed Intra-OP anesthesia mangement and issues during anesthesia, Set expectations for post-procedure period and Allowed opportunity for questions and acknowledgement of understanding      Vitals:  Vitals Value Taken Time   BP     Temp     Pulse     Resp     SpO2         Electronically Signed By: BRITNEY Montgomery CRNA  February 9, 2022  11:00 AM

## 2022-02-09 NOTE — BRIEF OP NOTE
[facility]    Brief Operative Note    Pre-operative diagnosis: Congenital ptosis of right upper eyelid [Q10.0]  Post-operative diagnosis Same as pre-operative diagnosis    Procedure: Procedure(s):  right upper eyelid ptosis repair  Surgeon: Surgeon(s) and Role:     * Hair Breen MD - Primary   Taylor Almonte MD - assistant  Sophie Khan MD - assistant  Anesthesia: Monitor Anesthesia Care   Estimated Blood Loss: Minimal    Drains: None  Specimens: * No specimens in log *  Findings:   as expected.  Complications: None.  Implants: * No implants in log *

## 2022-02-09 NOTE — ANESTHESIA POSTPROCEDURE EVALUATION
Patient: Ana Ch    Procedure: Procedure(s):  right upper eyelid ptosis repair       Diagnosis:Congenital ptosis of right upper eyelid [Q10.0]  Diagnosis Additional Information: No value filed.    Anesthesia Type:  MAC    Note:  Disposition: Outpatient   Postop Pain Control: Uneventful            Sign Out: Well controlled pain   PONV: No   Neuro/Psych: Uneventful            Sign Out: Acceptable/Baseline neuro status   Airway/Respiratory: Uneventful            Sign Out: Acceptable/Baseline resp. status   CV/Hemodynamics: Uneventful            Sign Out: Acceptable CV status; No obvious hypovolemia; No obvious fluid overload   Other NRE: NONE   DID A NON-ROUTINE EVENT OCCUR? No           Last vitals:  Vitals Value Taken Time   /76 02/09/22 1115   Temp 36.7  C (98  F) 02/09/22 1115   Pulse 72 02/09/22 1115   Resp 16 02/09/22 1115   SpO2 96 % 02/09/22 1115       Electronically Signed By: Garrison Capps MD  February 9, 2022  12:11 PM

## 2022-02-09 NOTE — DISCHARGE INSTRUCTIONS
Post-operative Instructions    Ophthalmic Plastic and Reconstructive Surgery  Hair Breen M.D.  Taylor Almonte M.D.    All instructions apply to the operated eye(s) or eyelid(s)    What to expect after surgery:    There will be some swelling, bruising, and likely a black eye (even into the lower eyelids and cheeks). Also expect crusting and discharge from the eye and/or incisions.     A small amount of surface bleeding is normal for the first 48 hours after surgery.    You may notice some bloody tears for the first few days after surgery. This is normal.    Your eye(s) and eyelid(s) may be painful and tender. This is normal after surgery. Use the pain medication as prescribed. If your pain does not improve despite the medication, contact the office.    Wound care and personal care:    If a patch or bandage has been placed, please leave this in place until seen in clinic. Prevent the bandage from getting wet.     Apply ice compresses 15 minutes on 15 minutes off while awake for the first 2 days after surgery, then switch to warm compresses 4 times a day until seen by your physician.     For warm packs you can place a cup of dry uncooked rice in a clean cotton sock. Place sock in microwave 30 seconds to one minute. Next place the warm sock into a plastic bag and wrap the bag with clean warm wet washcloth and place over operated eye.      You may shower or wash your hair the day after surgery. Do not bathe or go swimming for 1 week to prevent contamination of your wounds.    Do not apply make-up to the eyes or eyelids for 2 weeks after surgery.    Activity restrictions and driving:    Avoid heavy lifting, bending, exercise or strenuous activity for 1 week after surgery.    You may resume other activities and return to work as tolerated.    You may not resume driving until have you stopped using narcotic pain medications(such as Norco, Percocet, Tylenol #3).    Medications:    Restart all your regular home  medications and eye drops today. If you take Plavix or Aspirin on a regular basis, wait for 3 days after your surgery before restarting these in order to decrease the risk of bleeding complications.    Avoid aspirin and aspirin-like medications (Motrin, Aleve, Ibuprofen, [place] etc) for 5 days to reduce the risk of bleeding. You may take Tylenol (acetaminophen) for pain.    In addition to your home medications, take the following post-operative medications as prescribed by your physician:    Apply antibiotic ointment (erythromycin) to all sutures three times a day, and into the operated eye(s) at night.     Instill eye drops (Maxitrol) four times a day until the bottle finished.     Take scheduled extra strength Tylenol for pain.  You may take 1 to 2 pain pills (norco or oxycodone as prescribed) as needed for breakthrough pain up to every 6 hours.    The pain pills may make you drowsy. You must not drive a car, operate heavy machinery or drink alcohol while taking them.    The pain pills may cause constipation and nausea. Take them with some food to prevent a stomach upset. If you continue to experience nausea, call your physician.      WARNING: All the prescription pain medications listed above contain Tylenol (acetaminophen). You must not take more than 4,000 mg of acetaminophen per 24-hour period. This is equivalent to 6 tablets of Darvocet, 8 tablets of Vicodin, or 12 tablets of Norco, Percocet or Tylenol #3. If you take other over-the-counter medications containing acetaminophen, you must take the amount of acetaminophen into account and reduce the number of prescribed pain pills accordingly.    Contact information and follow-up:    Return to the Eye Clinic for a follow-up appointment with your physician as  scheduled. If no appointment has been scheduled, call [phone] for an  appointment with Dr. Breen within 1 to 2 weeks from your date of surgery.  -     Please email a few photos of your eye(s)  or other operative site(s) to [email or web address] prior to your follow up visit.      For severe pain, bleeding, or loss of vision, call the Eye Clinic at [phone].    After hours or on weekends and holidays, call [phone] and ask to speak with the ophthalmologist on call.  [facility]  Home Care Following Anesthesia  For 24 hours after surgery:  1. Get plenty of rest.  A responsible adult must stay with you for at least 24 hours after you leave the surgery center.  2. Do not drive or use heavy equipment.  If you have weakness or tingling, don't drive or use heavy equipment until this feeling goes away.   3. Do not drink alcohol.   4. Avoid strenuous or risky activities.  Ask for help when climbing stairs.  5. You may feel lightheaded.  IF so, sit for a few minutes before standing.  Have someone help you get up.   6. If you have nausea (feel sick to your stomach): Drink only clear liquids such as apple juice, ginger ale, broth or 7-Up.  Rest may also help.  Be sure to drink enough fluids.  Move to a regular diet as you feel able.   7. You may have a slight fever.  Call the doctor if your fever is over 100 F (37.7 C) (taken under the tongue) or lasts longer than 24 hours.  8. You may have a dry mouth, a sore throat, muscle aches or trouble sleeping. These should go away after 24 hours.  9. Do not make important or legal decisions.   10. It is recommended to avoid smoking.               Tips for taking pain medications  To get the best pain relief possible, remember these points:    Take pain medications as directed, before pain becomes severe.    Pain medication can upset your stomach: taking it with food may help.    Constipation is a common side effect of pain medication. Drink plenty of  fluids.    Eat foods high in fiber. Take a stool softener if recommended by your doctor or pharmacist.    Do not drink alcohol, drive or operate machinery while taking pain  medications.    Ask about other ways to control pain, such as with heat, ice or relaxation.    Tylenol/Acetaminophen Consumption  To help encourage the safe use of acetaminophen, the makers of TYLENOL  have lowered the maximum daily dose for single-ingredient Extra Strength TYLENOL  (acetaminophen) products sold in the [place] from 8 pills per day (4,000 mg) to 6 pills per day (3,000 mg). The dosing interval has also changed from 2 pills every 4-6 hours to 2 pills every 6 hours.    If you feel your pain relief is insufficient, you may take Tylenol/Acetaminophen in addition to your narcotic pain medication.     Be careful not to exceed 3,000 mg of Tylenol/Acetaminophen in a 24 hour period from all sources.    If you are taking extra strength Tylenol/acetaminophen (500 mg), the maximum dose is 6 tablets in 24 hours.    If you are taking regular strength acetaminophen (325 mg), the maximum dose is 9 tablets in 24 hours.    Call a doctor for any of the followin. Signs of infection (fever, growing tenderness at the surgery site, a large amount of drainage or bleeding, severe pain, foul-smelling drainage, redness, swelling).  2. It has been over 8 to 10 hours since surgery and you are still not able to urinate (pass water).  3. Headache for over 24 hours.  4. Numbness, tingling or weakness the day after surgery (if you had spinal anesthesia).  5. Signs of Covid-19 infection (temperature over 100 degrees, shortness of breath, cough, loss of taste/smell, generalized body aches, persistent headache, chills, sore throat, nausea/vomiting/diarrhea)  Your doctor is:  Dr. Hair Breen, Ophthalmology: [phone]                    Or dial [phone] and ask for the resident on call for:  Ophthalmology  For emergency care, call the:  [facility]:  [phone] (TTY for hearing impaired: [phone])

## 2022-02-10 ENCOUNTER — DOCUMENTATION ONLY (OUTPATIENT)
Dept: OPHTHALMOLOGY | Facility: CLINIC | Age: 32
End: 2022-02-10
Payer: COMMERCIAL

## 2022-02-10 NOTE — PROGRESS NOTES
Telephone call to Ana Ch    Doing well with no pain, good vision, and no bleeding. All questions were answered, he is doing well, and postoperative care was reviewed.  A postop appointment has been scheduled.    Taylor Almonte MD

## 2022-02-10 NOTE — OP NOTE
PREOPERATIVE DIAGNOSIS: Myogenic ptosis, right upper eyelid.   POSTOPERATIVE DIAGNOSIS: Myogenic ptosis, right upper eyelid.   PROCEDURE PERFORMED:  Right upper eyelid  ptosis repair with external levator resection and a full thickness resection of tarsus.   SURGEON: Hair Breen MD   ASSISTANT:  Taylor Almonte MD and Sophie Khan MD  ANESTHESIA: Monitored with local infiltration of 1% lidocaine with epinephrine.   COMPLICATIONS: None.   ESTIMATED BLOOD LOSS: Less than 5 mL.   HISTORY: Ana Ch  presented with severe ptosis of right upper lid interfering with visual field and activities of daily living. After the risks, benefits and alternatives, the proposed procedure were explained, informed consent was obtained from the patient.   DESCRIPTION OF PROCEDURE: The patient was brought to the operating room and placed supine on the operating table. The upper eyelid crease was marked with a marking pen and infiltrated with local anesthetic. Ana Ch  was prepped and draped in the typical sterile ophthalmic fashion. Attention was directed to the right  side. A lid crease incision was made with a #15 blade and dissection carried down through the orbicularis with high temperature cautery. The dissection was carried in the suborbicularis plane over the tarsal plate. The orbital septum was opened horizontally and levator aponeurosis identified. Levator was dissected from the superior tarsal plate and underlying Macias's muscle. A full thickness tarsus resection was then performed, 2 mm of tarsus was measured with the Castroviejo calipers.A corneal shield was placed over the eye. The tarsus was incised with a #15 blade. The resection was then performed using a Reddy scissors. Hemostasis was obtained with high temperature cautery and the tarsus closed with interrupted 6-0 chromic gut sutures. Levator aponeurosis was then advanced onto the superior tarsal plate to bring the lid into a normal height  and contour with a 5-0 Mersilene suture in a horizontal mattress fashion. Two interrupted Mersilene sutures were placed medially and laterally for contour. The orbicularis of the inferior edge of the incision was secured to the levator aponeurosis with interrupted 6-0 chromic gut sutures in a buried fashion. The skin was closed with running 6-0 plain gut sutures. The corneal shield was removed and ophthalmic antibiotic ointment applied to the incision and into the eye.  The patient tolerated the procedure well and left the operating room in stable condition.   MEGHANN HOLGUIN MD

## 2022-02-18 ENCOUNTER — TELEPHONE (OUTPATIENT)
Dept: OPHTHALMOLOGY | Facility: CLINIC | Age: 32
End: 2022-02-18
Payer: COMMERCIAL

## 2022-02-18 NOTE — TELEPHONE ENCOUNTER
Confirmed with pt after review with Dr. Almonte 10 day use of alexandre/poly/dex eye drops    Pt verbally demonstrated understanding    Lenny Neri RN 4:32 PM 02/18/22    --      S/p Ptosis repair 2-9-2022 with Dr. Breen    Pt calling triage line with questions    Spoke to pt at 1328    Reviewed Erythromycin ointment 3/day and inside eye at night til tube gone, then vaseline or aquaphor to incision site ongoing while healing and systane nighttime ointment at night.  Encouraged artificial tear use during day.    Pt seemed satisfied with information and would like to confirm how long to use alexandre/poly/dex eye drops    Stated would confirm with Dr. Almonte and call back    Lenny Neri RN 1:34 PM 02/18/22           Name band;

## 2022-02-28 ENCOUNTER — OFFICE VISIT (OUTPATIENT)
Dept: OPHTHALMOLOGY | Facility: CLINIC | Age: 32
End: 2022-02-28
Payer: COMMERCIAL

## 2022-02-28 DIAGNOSIS — Z98.890 POSTOPERATIVE EYE STATE: Primary | ICD-10-CM

## 2022-02-28 PROCEDURE — 99024 POSTOP FOLLOW-UP VISIT: CPT | Mod: GC | Performed by: OPHTHALMOLOGY

## 2022-02-28 ASSESSMENT — VISUAL ACUITY
METHOD: SNELLEN - LINEAR
CORRECTION_TYPE: GLASSES
OD_CC: 20/20
OS_CC: 20/20

## 2022-02-28 ASSESSMENT — MARGIN REFLEX DISTANCE
OD_MRD1: 5
OS_MRD1: 4

## 2022-02-28 ASSESSMENT — TONOMETRY
OS_IOP_MMHG: 11
IOP_METHOD: ICARE
OD_IOP_MMHG: 10

## 2022-02-28 ASSESSMENT — LAGOPHTHALMOS
OS_LAGOPHTHALMOS: 0
OD_LAGOPHTHALMOS: 0.5

## 2022-02-28 ASSESSMENT — EXTERNAL EXAM - LEFT EYE: OS_EXAM: NORMAL

## 2022-02-28 ASSESSMENT — EXTERNAL EXAM - RIGHT EYE: OD_EXAM: NORMAL

## 2022-02-28 NOTE — PROGRESS NOTES
Chief Complaints and History of Present Illnesses   Patient presents with     Post Op (Ophthalmology) Right Eye     POW#3 s/p RUL ptosis repair with external levator resection and a full thickness resection of tarsus     Chief Complaint(s) and History of Present Illness(es)     Post Op (Ophthalmology) Right Eye     In right eye.  Associated symptoms include itching.  Negative for eye   pain, redness, discharge and burning. Additional comments: POW#3 s/p RUL   ptosis repair with external levator resection and a full thickness   resection of tarsus              Comments     Pt notes that has a FBS in the RE that comes and goes, had some itching   last night.     Pt is using:   EES nga TID RE - just finished yesterday morning, no longer using gtts or   nga, AT prn per pt.                    Assessment & Plan     Ana Ch is a 32 year old male with the following diagnoses:   1. Postoperative eye state           S/p RUL external levator with ful thickness resection of tarsus 2/9/22  - lid in great position, pt very happy  - systane ointment at bedtime   * Continue antibiotic ointment or bland lubricating ointment (eg vaseline or aquaphor) to the incision site BID  * Massage along the incision BID  * Warm soaks QID until all edema and ecchymoses resolve  * Return to clinic in 6-8 weeks             Taylor Almonte MD  Oculoplastics Fellow    Attending Physician Attestation:  I have seen and examined this patient with the fellow .  I have confirmed and edited as necessary the chief complaint(s), history of present illness, review of systems, relevant history, and examination findings as documented by others.  I have personally reviewed the relevant tests, images, and reports as documented above.  I have confirmed and edited as necessary the assessment and plan and agree with this note.    - Hair Breen MD 9:17 AM 2/28/2022    \

## 2022-02-28 NOTE — PATIENT INSTRUCTIONS
Continue antibiotic ointment or bland lubricating ointment (eg vaseline or aquaphor) to the incision(s) two times a day.    Gently massage along the incision(s) two times a day.    Use warm soaks over the incision(s) four times a day until swelling and bruises resolve.    Use systane ointment at bedtime

## 2022-02-28 NOTE — NURSING NOTE
Chief Complaints and History of Present Illnesses   Patient presents with     Post Op (Ophthalmology) Right Eye     POW#3 s/p RUL ptosis repair with external levator resection and a full thickness resection of tarsus     Chief Complaint(s) and History of Present Illness(es)     Post Op (Ophthalmology) Right Eye     Laterality: right eye    Associated symptoms: itching.  Negative for eye pain, redness, discharge and burning    Comments: POW#3 s/p RUL ptosis repair with external levator resection and a full thickness resection of tarsus              Comments     Pt notes that has a FBS in the RE that comes and goes, had some itching last night.     Pt is using:   EES nga TID RE - just finished yesterday morning, no longer using gtts or nga, AT prn per pt.               Anay Tompkins COT February 28, 2022 8:56 AM

## 2022-03-03 ENCOUNTER — OFFICE VISIT (OUTPATIENT)
Dept: URGENT CARE | Facility: URGENT CARE | Age: 32
End: 2022-03-03
Payer: COMMERCIAL

## 2022-03-03 VITALS
OXYGEN SATURATION: 98 % | TEMPERATURE: 98.8 F | HEART RATE: 80 BPM | WEIGHT: 285 LBS | SYSTOLIC BLOOD PRESSURE: 140 MMHG | BODY MASS INDEX: 43.19 KG/M2 | RESPIRATION RATE: 15 BRPM | DIASTOLIC BLOOD PRESSURE: 66 MMHG | HEIGHT: 68 IN

## 2022-03-03 DIAGNOSIS — R10.30 LOWER ABDOMINAL PAIN: Primary | ICD-10-CM

## 2022-03-03 DIAGNOSIS — Z11.3 SCREEN FOR STD (SEXUALLY TRANSMITTED DISEASE): ICD-10-CM

## 2022-03-03 LAB
ALBUMIN UR-MCNC: >=300 MG/DL
APPEARANCE UR: CLEAR
BACTERIA #/AREA URNS HPF: ABNORMAL /HPF
BILIRUB UR QL STRIP: NEGATIVE
COLOR UR AUTO: YELLOW
GLUCOSE UR STRIP-MCNC: NEGATIVE MG/DL
HGB UR QL STRIP: ABNORMAL
KETONES UR STRIP-MCNC: NEGATIVE MG/DL
LEUKOCYTE ESTERASE UR QL STRIP: NEGATIVE
NITRATE UR QL: NEGATIVE
PH UR STRIP: 5.5 [PH] (ref 5–7)
RBC #/AREA URNS AUTO: ABNORMAL /HPF
SP GR UR STRIP: >=1.03 (ref 1–1.03)
SQUAMOUS #/AREA URNS AUTO: ABNORMAL /LPF
UROBILINOGEN UR STRIP-ACNC: 0.2 E.U./DL
WBC #/AREA URNS AUTO: ABNORMAL /HPF
YEAST #/AREA URNS HPF: ABNORMAL /HPF

## 2022-03-03 PROCEDURE — 96372 THER/PROPH/DIAG INJ SC/IM: CPT | Performed by: PHYSICIAN ASSISTANT

## 2022-03-03 PROCEDURE — 81001 URINALYSIS AUTO W/SCOPE: CPT | Performed by: PHYSICIAN ASSISTANT

## 2022-03-03 PROCEDURE — 36415 COLL VENOUS BLD VENIPUNCTURE: CPT | Performed by: PHYSICIAN ASSISTANT

## 2022-03-03 PROCEDURE — 87591 N.GONORRHOEAE DNA AMP PROB: CPT | Performed by: PHYSICIAN ASSISTANT

## 2022-03-03 PROCEDURE — 86706 HEP B SURFACE ANTIBODY: CPT | Performed by: PHYSICIAN ASSISTANT

## 2022-03-03 PROCEDURE — 99214 OFFICE O/P EST MOD 30 MIN: CPT | Mod: 25 | Performed by: PHYSICIAN ASSISTANT

## 2022-03-03 PROCEDURE — 86803 HEPATITIS C AB TEST: CPT | Performed by: PHYSICIAN ASSISTANT

## 2022-03-03 PROCEDURE — 87491 CHLMYD TRACH DNA AMP PROBE: CPT | Performed by: PHYSICIAN ASSISTANT

## 2022-03-03 PROCEDURE — 87389 HIV-1 AG W/HIV-1&-2 AB AG IA: CPT | Performed by: PHYSICIAN ASSISTANT

## 2022-03-03 PROCEDURE — 87340 HEPATITIS B SURFACE AG IA: CPT | Performed by: PHYSICIAN ASSISTANT

## 2022-03-03 PROCEDURE — 86780 TREPONEMA PALLIDUM: CPT | Performed by: PHYSICIAN ASSISTANT

## 2022-03-03 RX ORDER — DOXYCYCLINE 100 MG/1
100 CAPSULE ORAL 2 TIMES DAILY
Qty: 14 CAPSULE | Refills: 0 | Status: SHIPPED | OUTPATIENT
Start: 2022-03-03 | End: 2022-03-10

## 2022-03-03 NOTE — LETTER
[facility]  [address] [place] [address]  [phone]      March 3, 2022    RE:  Ana JAIME Dima                                                                                                                                                       [address]  [place] [address]            To whom it may concern:    Ana S Dima was seen in clinic today.        Sincerely,        Yari Bauer PA-C    [facility]

## 2022-03-04 LAB
HBV SURFACE AB SERPL IA-ACNC: 4.35 M[IU]/ML
HBV SURFACE AG SERPL QL IA: NONREACTIVE
HCV AB SERPL QL IA: NONREACTIVE
HIV 1+2 AB+HIV1 P24 AG SERPL QL IA: NONREACTIVE
T PALLIDUM AB SER QL: NONREACTIVE

## 2022-03-04 NOTE — PROGRESS NOTES
"URGENT CARE VISIT:    SUBJECTIVE:    Ana Ch is a 32 year old male who  presents today for lower abdominal pain and pulling sensation with urination. Symptoms have been going on for 1 day(s). Symptoms were sudden onset and mild and moderate.  Patient denies urgency, frequency, burning, back pain, penile discharge, fever, chills, nausea, diarrhea, and vomiting. This patient does have a history of STIs. He is sexually active with men.    PMH:   Past Medical History:   Diagnosis Date     Hypertension     Controled with medication     Renal disease      Allergies: Nifedipine   Medications:   Current Outpatient Medications   Medication Sig Dispense Refill     allopurinol (ZYLOPRIM) 100 MG tablet Take 50 mg by mouth       carvedilol (COREG) 6.25 MG tablet Take 1 tablet (6.25 mg) by mouth 2 times daily (with meals)       doxycycline hyclate (VIBRAMYCIN) 100 MG capsule Take 1 capsule (100 mg) by mouth 2 times daily for 7 days 14 capsule 0     emtricitabine-tenofovir AF (DESCOVY) 200-25 MG per tablet Take 1 tablet by mouth daily       Melatonin 2.5 MG CAPS        NIFEdipine ER OSMOTIC (PROCARDIA XL) 30 MG 24 hr tablet Take 3 tablets (90 mg) by mouth daily       sertraline (ZOLOFT) 50 MG tablet Take 50 mg by mouth daily       acetaminophen (TYLENOL) 325 MG tablet Take 325-650 mg by mouth every 6 hours as needed       order for DME Equipment being ordered: right knee brace/sleeve 1 Device 0     Social History:   Social History     Tobacco Use     Smoking status: Never Smoker     Smokeless tobacco: Never Used   Substance Use Topics     Alcohol use: Yes     Comment: One drink per week       ROS:  Review of systems negative except as stated above.    OBJECTIVE:  BP (!) 140/66   Pulse 80   Temp 98.8  F (37.1  C) (Oral)   Resp 15   Ht 1.727 m (5' 8\")   Wt 129.3 kg (285 lb)   SpO2 98%   BMI 43.33 kg/m    GENERAL APPEARANCE: healthy, alert and no distress  RESP: lungs clear to auscultation - no rales, rhonchi or " wheezes  CV: regular rates and rhythm, normal S1 S2, no murmur noted  ABDOMEN:  Soft, mild suprapubic abdominal TTP, no HSM or masses and bowel sounds normal  BACK: No CVA tenderness  SKIN: no suspicious lesions or rashes    Labs:    Results for orders placed or performed in visit on 03/03/22   UA Macro with Reflex to Micro and Culture - lab collect     Status: Abnormal    Specimen: Urine, Midstream   Result Value Ref Range    Color Urine Yellow Colorless, Straw, Light Yellow, Yellow    Appearance Urine Clear Clear    Glucose Urine Negative Negative mg/dL    Bilirubin Urine Negative Negative    Ketones Urine Negative Negative mg/dL    Specific Gravity Urine >=1.030 1.003 - 1.035    Blood Urine Trace (A) Negative    pH Urine 5.5 5.0 - 7.0    Protein Albumin Urine >=300 (A) Negative mg/dL    Urobilinogen Urine 0.2 0.2, 1.0 E.U./dL    Nitrite Urine Negative Negative    Leukocyte Esterase Urine Negative Negative   Urine Microscopic     Status: Abnormal   Result Value Ref Range    Bacteria Urine Moderate (A) None Seen /HPF    RBC Urine 0-2 0-2 /HPF /HPF    WBC Urine 0-5 0-5 /HPF /HPF    Squamous Epithelials Urine Few (A) None Seen /LPF    Budding Yeast Urine Few (A) None Seen /HPF    Narrative    Urine Culture not indicated       ASSESSMENT:     ICD-10-CM    1. Lower abdominal pain  R10.30 UA Macro with Reflex to Micro and Culture - lab collect     Neisseria gonorrhoeae PCR - Clinic Collect     Chlamydia trachomatis PCR - Clinic Collect     Urine Microscopic     cefTRIAXone (ROCEPHIN) injection 500 mg     doxycycline hyclate (VIBRAMYCIN) 100 MG capsule   2. Screen for STD (sexually transmitted disease)  Z11.3 Treponema Abs w Reflex to RPR and Titer     HIV Antigen Antibody Combo     Hepatitis B Surface Antibody     Hepatitis B surface antigen     Hepatitis C antibody       PLAN:  30 minutes spent on the date of the encounter doing chart review, review of test results, interpretation of tests, patient visit and  documentation.   Patient Instructions   Patient here for lower abdominal pain and pulling sensation after urination. Symptoms could be related to STI. Will treat empirically. Take medication as prescribed. Side effects discussed.  STI panel testing is pending. Conservative measures discussed including avoiding sexual intercourse for 7 days. To prevent reinfection use a condom every time you have sex. See your primary care provider if symptoms worsen or do not improve in 7 days. Seek emergency care if you develop severe pelvic pain.       Patient verbalized understanding and is agreeable to plan. The patient was discharged ambulatory and in stable condition.    Yari Bauer PA-C on 3/3/2022 at 7:28 PM

## 2022-03-04 NOTE — PATIENT INSTRUCTIONS
Patient here for lower abdominal pain and pulling sensation after urination. Symptoms could be related to STI. Will treat empirically. Take medication as prescribed. Side effects discussed.  STI panel testing is pending. Conservative measures discussed including avoiding sexual intercourse for 7 days. To prevent reinfection use a condom every time you have sex. See your primary care provider if symptoms worsen or do not improve in 7 days. Seek emergency care if you develop severe pelvic pain.

## 2022-03-05 LAB
C TRACH DNA SPEC QL NAA+PROBE: NEGATIVE
N GONORRHOEA DNA SPEC QL NAA+PROBE: NEGATIVE

## 2022-03-18 ENCOUNTER — TELEPHONE (OUTPATIENT)
Dept: OPHTHALMOLOGY | Facility: CLINIC | Age: 32
End: 2022-03-18
Payer: COMMERCIAL

## 2022-03-18 NOTE — TELEPHONE ENCOUNTER
Spoke with patient regarding moving POST-OP Appointment earlier same day. Rescheduled patient as offered and sent new reminder letter to confirmed address.-Per Patient

## 2022-04-25 ENCOUNTER — OFFICE VISIT (OUTPATIENT)
Dept: OPHTHALMOLOGY | Facility: CLINIC | Age: 32
End: 2022-04-25
Payer: COMMERCIAL

## 2022-04-25 DIAGNOSIS — Q10.0 CONGENITAL PTOSIS OF RIGHT UPPER EYELID: ICD-10-CM

## 2022-04-25 DIAGNOSIS — Z98.890 POSTOPERATIVE EYE STATE: Primary | ICD-10-CM

## 2022-04-25 PROCEDURE — 99024 POSTOP FOLLOW-UP VISIT: CPT | Performed by: STUDENT IN AN ORGANIZED HEALTH CARE EDUCATION/TRAINING PROGRAM

## 2022-04-25 RX ORDER — SODIUM BICARBONATE 650 MG/1
1950 TABLET ORAL 3 TIMES DAILY
COMMUNITY
Start: 2022-04-01

## 2022-04-25 ASSESSMENT — VISUAL ACUITY
METHOD: SNELLEN - LINEAR
CORRECTION_TYPE: GLASSES
OS_CC: 20/20
OD_CC: 20/20

## 2022-04-25 ASSESSMENT — MARGIN REFLEX DISTANCE
OD_MRD1: 4
OS_MRD1: 4

## 2022-04-25 ASSESSMENT — EXTERNAL EXAM - LEFT EYE: OS_EXAM: NORMAL

## 2022-04-25 ASSESSMENT — TONOMETRY
IOP_METHOD: ICARE
OS_IOP_MMHG: 8
OD_IOP_MMHG: 11

## 2022-04-25 ASSESSMENT — LAGOPHTHALMOS
OS_LAGOPHTHALMOS: 0
OD_LAGOPHTHALMOS: 0

## 2022-04-25 ASSESSMENT — EXTERNAL EXAM - RIGHT EYE: OD_EXAM: NORMAL

## 2022-04-25 NOTE — PROGRESS NOTES
Chief Complaints and History of Present Illnesses   Patient presents with     Follow Up      S/P Right upper eyelid  ptosis repair with external levator resection and a full thickness resection of tarsus. 2/9/22     Chief Complaint(s) and History of Present Illness(es)     Follow Up     In right eye. Additional comments:  S/P Right upper eyelid  ptosis repair   with external levator resection and a full thickness resection of tarsus.   2/9/22              Comments     Doing well after the procedure.   Happy with the result of surgery.   No pain or discharge.   Vision right eye seem to fine. Did have some blurry vision a few weeks   ago. Questions if this was due to allergys but only lasted a day or two.     Helga Ch COT COT 2:23 PM April 25, 2022           Aquaphor incision line a few times weekly with right eye.   Lubricants PRN.     Helga Ch COT COT 2:15 PM April 25, 2022                        Assessment & Plan     Ana Ch is a 32 year old male with the following diagnoses:   1. Postoperative eye state    2. Congenital ptosis of right upper eyelid         s/p RUL ptosis repair with external levator resection and a full thickness resection of tarsus 2/9/22  - lid is in great position!   - still has mild right upper lid edema - restart warm compresses TID until resolved  - follow up PRN                  Taylor Almonte MD  Oculoplastics Fellow    Attending Physician Attestation: Complete documentation of historical and exam elements from today's encounter can be found in the full encounter summary report (not reduplicated in this progress note). I personally obtained the chief complaint(s) and history of present illness. I confirmed and edited as necessary the review of systems, past medical/surgical history, family history, social history, and examination findings as documented by others; and I examined the patient myself. I personally reviewed the relevant tests, images, and reports as  documented above. I formulated and edited as necessary the assessment and plan and discussed the findings and management plan with the patient  -Taylor Almonte MD

## 2022-04-25 NOTE — NURSING NOTE
Chief Complaints and History of Present Illnesses   Patient presents with     Follow Up      S/P Right upper eyelid  ptosis repair with external levator resection and a full thickness resection of tarsus. 2/9/22     Chief Complaint(s) and History of Present Illness(es)     Follow Up     Laterality: right eye    Comments:  S/P Right upper eyelid  ptosis repair with external levator resection and a full thickness resection of tarsus. 2/9/22              Comments     Doing well after the procedure.   Happy with the result of surgery.   No pain or discharge.   Vision right eye seem to fine. Did have some blurry vision a few weeks ago. Questions if this was due to allergys but only lasted a day or two.     Helga Ch COT COT 2:23 PM April 25, 2022           Aquaphor incision line a few times weekly with right eye.   Lubricants PRN.     MATILDA Adkins COT 2:15 PM April 25, 2022

## 2022-07-21 ENCOUNTER — OFFICE VISIT (OUTPATIENT)
Dept: URGENT CARE | Facility: URGENT CARE | Age: 32
End: 2022-07-21
Payer: COMMERCIAL

## 2022-07-21 VITALS
TEMPERATURE: 98.3 F | HEART RATE: 82 BPM | SYSTOLIC BLOOD PRESSURE: 130 MMHG | HEIGHT: 68 IN | DIASTOLIC BLOOD PRESSURE: 80 MMHG | WEIGHT: 290 LBS | RESPIRATION RATE: 18 BRPM | OXYGEN SATURATION: 97 % | BODY MASS INDEX: 43.95 KG/M2

## 2022-07-21 DIAGNOSIS — M25.571 PAIN IN JOINT, ANKLE AND FOOT, RIGHT: Primary | ICD-10-CM

## 2022-07-21 PROCEDURE — 99214 OFFICE O/P EST MOD 30 MIN: CPT | Performed by: PREVENTIVE MEDICINE

## 2022-07-21 RX ORDER — METHYLPREDNISOLONE 4 MG
TABLET, DOSE PACK ORAL
Qty: 21 TABLET | Refills: 0 | Status: SHIPPED | OUTPATIENT
Start: 2022-07-21 | End: 2023-02-20

## 2022-07-23 NOTE — PROGRESS NOTES
Assessment & Plan     1. Pain in joint, ankle and foot, right  - methylPREDNISolone (MEDROL DOSEPAK) 4 MG tablet therapy pack; Follow Package Directions  Dispense: 21 tablet; Refill: 0    Suspect gout in the right ankle.  Has had this in the past.  Treat with medrol dose pack  Discussed xray and labs today.  Will defer for now and advise close follow up with pcp in 1 week if not improving or sooner as needed.        No follow-ups on file.    Uvaldo Montague MD  [facility]    Subjective     Ana Ch is a 32 year old year old male who presents to clinic today for the following health issues:    Patient presents with:  Urgent Care  Ankle Pain: Since yesterday Rt ankle pain, no injury.    This is a 31 yo man with right ankle pain since yesterday.  It is warm and ttp.  No fever or chills and otherwise feels well.  No trauma or injury.  Feels like gout as he has had this in the past.  Has resolved with medrol dose pack in the past.  All other joints feel fine.    Patient Active Problem List   Diagnosis     HTN (hypertension)     CKD (chronic kidney disease) stage 4, GFR 15-29 ml/min (H)     Gout     Mixed anxiety and depressive disorder     Obesity     Congenital ptosis of eyelid     Focal segmental glomerulosclerosis     Morbid obesity (H)       Current Outpatient Medications   Medication     methylPREDNISolone (MEDROL DOSEPAK) 4 MG tablet therapy pack     acetaminophen (TYLENOL) 325 MG tablet     allopurinol (ZYLOPRIM) 100 MG tablet     carvedilol (COREG) 6.25 MG tablet     emtricitabine-tenofovir AF (DESCOVY) 200-25 MG per tablet     Melatonin 2.5 MG CAPS     NIFEdipine ER OSMOTIC (PROCARDIA XL) 30 MG 24 hr tablet     order for DME     sertraline (ZOLOFT) 50 MG tablet     sodium bicarbonate 650 MG tablet     No current facility-administered medications for this visit.       Past Medical History:   Diagnosis Date     Hypertension     Controled with medication     Renal  "disease        Social History   reports that he has never smoked. He has never used smokeless tobacco. He reports current alcohol use. He reports that he does not use drugs.    Family History   Problem Relation Age of Onset     Glaucoma No family hx of      Macular Degeneration No family hx of        Review of Systems  Constitutional, HEENT, cardiovascular, pulmonary, GI, , musculoskeletal, neuro, skin, endocrine and psych systems are negative, except as otherwise noted.      Objective    /80   Pulse 82   Temp 98.3  F (36.8  C) (Temporal)   Resp 18   Ht 1.727 m (5' 8\")   Wt 131.5 kg (290 lb)   SpO2 97%   BMI 44.09 kg/m    Physical Exam   GENERAL: healthy, alert and no distress  EYES: Eyes grossly normal to inspection, PERRL and conjunctivae and sclerae normal  HENT: ear canals and TM's normal, nose and mouth without ulcers or lesions  NECK: no adenopathy, no asymmetry, masses, or scars and thyroid normal to palpation  RESP: lungs clear to auscultation - no rales, rhonchi or wheezes  CV: regular rate and rhythm, normal S1 S2, no S3 or S4, no murmur, click or rub, no peripheral edema and peripheral pulses strong  ABDOMEN: soft, nontender, no hepatosplenomegaly, no masses and bowel sounds normal  MS: no gross musculoskeletal defects noted, no edema  SKIN: no suspicious lesions or rashes  NEURO: Normal strength and tone, mentation intact and speech normal  PSYCH: mentation appears normal, affect normal/bright  R leg - nl cms, ttp r ankle, warmth, no redness, nl active and passive rom of right ankle      "

## 2022-10-23 ENCOUNTER — HEALTH MAINTENANCE LETTER (OUTPATIENT)
Age: 32
End: 2022-10-23

## 2022-11-19 ENCOUNTER — OFFICE VISIT (OUTPATIENT)
Dept: FAMILY MEDICINE | Facility: CLINIC | Age: 32
End: 2022-11-19
Payer: COMMERCIAL

## 2022-11-19 VITALS
RESPIRATION RATE: 18 BRPM | SYSTOLIC BLOOD PRESSURE: 132 MMHG | WEIGHT: 285 LBS | BODY MASS INDEX: 43.33 KG/M2 | HEART RATE: 86 BPM | OXYGEN SATURATION: 98 % | TEMPERATURE: 98.3 F | DIASTOLIC BLOOD PRESSURE: 74 MMHG

## 2022-11-19 DIAGNOSIS — R32 URINARY INCONTINENCE, UNSPECIFIED TYPE: Primary | ICD-10-CM

## 2022-11-19 LAB
ALBUMIN UR-MCNC: >=300 MG/DL
APPEARANCE UR: CLEAR
BILIRUB UR QL STRIP: NEGATIVE
COLOR UR AUTO: YELLOW
GLUCOSE UR STRIP-MCNC: NEGATIVE MG/DL
HGB UR QL STRIP: ABNORMAL
KETONES UR STRIP-MCNC: NEGATIVE MG/DL
LEUKOCYTE ESTERASE UR QL STRIP: NEGATIVE
NITRATE UR QL: NEGATIVE
PH UR STRIP: 6 [PH] (ref 5–8)
RBC #/AREA URNS AUTO: ABNORMAL /HPF
SP GR UR STRIP: 1.02 (ref 1–1.03)
UROBILINOGEN UR STRIP-ACNC: 0.2 E.U./DL
WBC #/AREA URNS AUTO: ABNORMAL /HPF

## 2022-11-19 PROCEDURE — 99213 OFFICE O/P EST LOW 20 MIN: CPT | Performed by: FAMILY MEDICINE

## 2022-11-19 PROCEDURE — 81001 URINALYSIS AUTO W/SCOPE: CPT | Performed by: FAMILY MEDICINE

## 2022-11-19 NOTE — PROGRESS NOTES
Assessment & Plan   1. Urinary incontinence, unspecified type    - UA with Microscopic reflex to Culture - Clinic Collect  - Urine Microscopic    Reassured UA does not show UTI.  Recommend Follow-up with RENAL as needed for further eval prn if sx persist or worsen.    Jaqueline Espinosa MD  [facility] QUINTIN Perez is a 32 year old, presenting for the following health issues:  Incontinence (Wet bed several times in the last 2 nights and weird bearing down when going to the bathroom )      HPI   Hx of Stage V kidney disease on kidney transplant list-- wet bed overnight twice this week.  No dysuria or frequency.  Has noted a strange feeling when stooling/bearing down.  No fever or vomiting or abdominal pain.        Review of Systems   Constitutional, HEENT, cardiovascular, pulmonary, GI, , musculoskeletal, neuro, skin, endocrine and psych systems are negative, except as otherwise noted.      Objective    /74   Pulse 86   Temp 98.3  F (36.8  C) (Oral)   Resp 18   Wt 129.3 kg (285 lb)   SpO2 98%   BMI 43.33 kg/m    Body mass index is 43.33 kg/m .  Physical Exam   GENERAL: healthy, alert and no distress  EYES: Eyes grossly normal to inspection, PERRL and conjunctivae and sclerae normal  PSYCH: mentation appears normal, affect normal/bright    Results for orders placed or performed in visit on 11/19/22 (from the past 24 hour(s))   UA with Microscopic reflex to Culture - Clinic Collect    Specimen: Urine, Clean Catch   Result Value Ref Range    Color Urine Yellow Colorless, Straw, Light Yellow, Yellow    Appearance Urine Clear Clear    Glucose Urine Negative Negative mg/dL    Bilirubin Urine Negative Negative    Ketones Urine Negative Negative mg/dL    Specific Gravity Urine 1.025 1.005 - 1.030    Blood Urine Trace (A) Negative    pH Urine 6.0 5.0 - 8.0    Protein Albumin Urine >=300 (A) Negative mg/dL    Urobilinogen Urine 0.2 0.2, 1.0 E.U./dL    Nitrite Urine  Negative Negative    Leukocyte Esterase Urine Negative Negative   Urine Microscopic   Result Value Ref Range    RBC Urine 2-5 (A) 0-2 /HPF /HPF    WBC Urine 0-5 0-5 /HPF /HPF    Narrative    Urine Culture not indicated

## 2022-12-22 ENCOUNTER — TRANSFERRED RECORDS (OUTPATIENT)
Dept: HEALTH INFORMATION MANAGEMENT | Facility: CLINIC | Age: 32
End: 2022-12-22

## 2023-01-04 ENCOUNTER — TRANSFERRED RECORDS (OUTPATIENT)
Dept: HEALTH INFORMATION MANAGEMENT | Facility: CLINIC | Age: 33
End: 2023-01-04

## 2023-01-10 ENCOUNTER — TRANSFERRED RECORDS (OUTPATIENT)
Dept: HEALTH INFORMATION MANAGEMENT | Facility: CLINIC | Age: 33
End: 2023-01-10

## 2023-02-01 ENCOUNTER — TRANSFERRED RECORDS (OUTPATIENT)
Dept: HEALTH INFORMATION MANAGEMENT | Facility: CLINIC | Age: 33
End: 2023-02-01

## 2023-02-20 RX ORDER — CALCITRIOL 0.25 UG/1
0.5 CAPSULE, LIQUID FILLED ORAL DAILY
COMMUNITY

## 2023-02-20 RX ORDER — TAMSULOSIN HYDROCHLORIDE 0.4 MG/1
0.4 CAPSULE ORAL DAILY
COMMUNITY

## 2023-02-20 RX ORDER — SERTRALINE HYDROCHLORIDE 100 MG/1
100 TABLET, FILM COATED ORAL DAILY
COMMUNITY

## 2023-02-20 RX ORDER — CHOLECALCIFEROL (VITAMIN D3) 50 MCG
1 TABLET ORAL DAILY
COMMUNITY

## 2023-02-21 ENCOUNTER — TRANSFERRED RECORDS (OUTPATIENT)
Dept: HEALTH INFORMATION MANAGEMENT | Facility: CLINIC | Age: 33
End: 2023-02-21

## 2023-02-21 ENCOUNTER — ANESTHESIA EVENT (OUTPATIENT)
Dept: SURGERY | Facility: HOSPITAL | Age: 33
End: 2023-02-21
Payer: COMMERCIAL

## 2023-02-21 ENCOUNTER — ANESTHESIA (OUTPATIENT)
Dept: SURGERY | Facility: HOSPITAL | Age: 33
End: 2023-02-21
Payer: COMMERCIAL

## 2023-02-21 ENCOUNTER — HOSPITAL ENCOUNTER (OUTPATIENT)
Facility: HOSPITAL | Age: 33
Discharge: HOME | End: 2023-02-21
Attending: UROLOGY | Admitting: UROLOGY
Payer: COMMERCIAL

## 2023-02-21 VITALS
WEIGHT: 281.9 LBS | BODY MASS INDEX: 42.86 KG/M2 | RESPIRATION RATE: 16 BRPM | TEMPERATURE: 98 F | HEART RATE: 98 BPM | DIASTOLIC BLOOD PRESSURE: 70 MMHG | OXYGEN SATURATION: 99 % | SYSTOLIC BLOOD PRESSURE: 148 MMHG

## 2023-02-21 DIAGNOSIS — D49.4 BLADDER TUMOR: Primary | ICD-10-CM

## 2023-02-21 PROCEDURE — 250N000009 HC RX 250: Performed by: NURSE ANESTHETIST, CERTIFIED REGISTERED

## 2023-02-21 PROCEDURE — 250N000011 HC RX IP 250 OP 636: Performed by: NURSE PRACTITIONER

## 2023-02-21 PROCEDURE — 250N000011 HC RX IP 250 OP 636: Performed by: NURSE ANESTHETIST, CERTIFIED REGISTERED

## 2023-02-21 PROCEDURE — 710N000012 HC RECOVERY PHASE 2, PER MINUTE: Performed by: UROLOGY

## 2023-02-21 PROCEDURE — 710N000009 HC RECOVERY PHASE 1, LEVEL 1, PER MIN: Performed by: UROLOGY

## 2023-02-21 PROCEDURE — 250N000013 HC RX MED GY IP 250 OP 250 PS 637: Performed by: ANESTHESIOLOGY

## 2023-02-21 PROCEDURE — 258N000003 HC RX IP 258 OP 636: Performed by: ANESTHESIOLOGY

## 2023-02-21 PROCEDURE — 250N000025 HC SEVOFLURANE, PER MIN: Performed by: UROLOGY

## 2023-02-21 PROCEDURE — 999N000141 HC STATISTIC PRE-PROCEDURE NURSING ASSESSMENT: Performed by: UROLOGY

## 2023-02-21 PROCEDURE — 250N000009 HC RX 250: Performed by: UROLOGY

## 2023-02-21 PROCEDURE — 88307 TISSUE EXAM BY PATHOLOGIST: CPT | Mod: 26 | Performed by: PATHOLOGY

## 2023-02-21 PROCEDURE — 88307 TISSUE EXAM BY PATHOLOGIST: CPT | Mod: TC | Performed by: UROLOGY

## 2023-02-21 PROCEDURE — 360N000076 HC SURGERY LEVEL 3, PER MIN: Performed by: UROLOGY

## 2023-02-21 PROCEDURE — 272N000001 HC OR GENERAL SUPPLY STERILE: Performed by: UROLOGY

## 2023-02-21 PROCEDURE — 370N000017 HC ANESTHESIA TECHNICAL FEE, PER MIN: Performed by: UROLOGY

## 2023-02-21 RX ORDER — OXYCODONE HYDROCHLORIDE 5 MG/1
10 TABLET ORAL EVERY 4 HOURS PRN
Status: DISCONTINUED | OUTPATIENT
Start: 2023-02-21 | End: 2023-02-21 | Stop reason: HOSPADM

## 2023-02-21 RX ORDER — HYDROCODONE BITARTRATE AND ACETAMINOPHEN 5; 325 MG/1; MG/1
1 TABLET ORAL ONCE
Status: DISCONTINUED | OUTPATIENT
Start: 2023-02-21 | End: 2023-02-21 | Stop reason: HOSPADM

## 2023-02-21 RX ORDER — LIDOCAINE 40 MG/G
CREAM TOPICAL
Status: DISCONTINUED | OUTPATIENT
Start: 2023-02-21 | End: 2023-02-21 | Stop reason: HOSPADM

## 2023-02-21 RX ORDER — FENTANYL CITRATE 50 UG/ML
INJECTION, SOLUTION INTRAMUSCULAR; INTRAVENOUS PRN
Status: DISCONTINUED | OUTPATIENT
Start: 2023-02-21 | End: 2023-02-21

## 2023-02-21 RX ORDER — FENTANYL CITRATE 50 UG/ML
25 INJECTION, SOLUTION INTRAMUSCULAR; INTRAVENOUS
Status: DISCONTINUED | OUTPATIENT
Start: 2023-02-21 | End: 2023-02-21 | Stop reason: HOSPADM

## 2023-02-21 RX ORDER — CEFAZOLIN SODIUM/WATER 3 G/30 ML
3 SYRINGE (ML) INTRAVENOUS SEE ADMIN INSTRUCTIONS
Status: DISCONTINUED | OUTPATIENT
Start: 2023-02-21 | End: 2023-02-21 | Stop reason: HOSPADM

## 2023-02-21 RX ORDER — NALOXONE HYDROCHLORIDE 0.4 MG/ML
0.4 INJECTION, SOLUTION INTRAMUSCULAR; INTRAVENOUS; SUBCUTANEOUS
Status: DISCONTINUED | OUTPATIENT
Start: 2023-02-21 | End: 2023-02-21 | Stop reason: HOSPADM

## 2023-02-21 RX ORDER — PROPOFOL 10 MG/ML
INJECTION, EMULSION INTRAVENOUS PRN
Status: DISCONTINUED | OUTPATIENT
Start: 2023-02-21 | End: 2023-02-21

## 2023-02-21 RX ORDER — ONDANSETRON 4 MG/1
4 TABLET, ORALLY DISINTEGRATING ORAL EVERY 30 MIN PRN
Status: DISCONTINUED | OUTPATIENT
Start: 2023-02-21 | End: 2023-02-21 | Stop reason: HOSPADM

## 2023-02-21 RX ORDER — CALCIUM ACETATE 667 MG/1
1-2 TABLET ORAL 3 TIMES DAILY
COMMUNITY

## 2023-02-21 RX ORDER — HYDROMORPHONE HYDROCHLORIDE 1 MG/ML
0.4 INJECTION, SOLUTION INTRAMUSCULAR; INTRAVENOUS; SUBCUTANEOUS EVERY 5 MIN PRN
Status: DISCONTINUED | OUTPATIENT
Start: 2023-02-21 | End: 2023-02-21 | Stop reason: HOSPADM

## 2023-02-21 RX ORDER — ACETAMINOPHEN 325 MG/1
975 TABLET ORAL ONCE
Status: COMPLETED | OUTPATIENT
Start: 2023-02-21 | End: 2023-02-21

## 2023-02-21 RX ORDER — OXYCODONE HYDROCHLORIDE 5 MG/1
5 TABLET ORAL EVERY 4 HOURS PRN
Status: DISCONTINUED | OUTPATIENT
Start: 2023-02-21 | End: 2023-02-21 | Stop reason: HOSPADM

## 2023-02-21 RX ORDER — OXYCODONE HYDROCHLORIDE 5 MG/1
5-10 TABLET ORAL EVERY 4 HOURS PRN
Qty: 6 TABLET | Refills: 0 | Status: SHIPPED | OUTPATIENT
Start: 2023-02-21

## 2023-02-21 RX ORDER — ONDANSETRON 2 MG/ML
4 INJECTION INTRAMUSCULAR; INTRAVENOUS EVERY 30 MIN PRN
Status: DISCONTINUED | OUTPATIENT
Start: 2023-02-21 | End: 2023-02-21 | Stop reason: HOSPADM

## 2023-02-21 RX ORDER — NALOXONE HYDROCHLORIDE 0.4 MG/ML
0.2 INJECTION, SOLUTION INTRAMUSCULAR; INTRAVENOUS; SUBCUTANEOUS
Status: DISCONTINUED | OUTPATIENT
Start: 2023-02-21 | End: 2023-02-21 | Stop reason: HOSPADM

## 2023-02-21 RX ORDER — CEFAZOLIN SODIUM/WATER 3 G/30 ML
3 SYRINGE (ML) INTRAVENOUS
Status: COMPLETED | OUTPATIENT
Start: 2023-02-21 | End: 2023-02-21

## 2023-02-21 RX ORDER — PHENOL 1.4 %
10 AEROSOL, SPRAY (ML) MUCOUS MEMBRANE
COMMUNITY

## 2023-02-21 RX ORDER — GLYCOPYRROLATE 0.2 MG/ML
INJECTION, SOLUTION INTRAMUSCULAR; INTRAVENOUS PRN
Status: DISCONTINUED | OUTPATIENT
Start: 2023-02-21 | End: 2023-02-21

## 2023-02-21 RX ORDER — HYDROMORPHONE HYDROCHLORIDE 1 MG/ML
0.2 INJECTION, SOLUTION INTRAMUSCULAR; INTRAVENOUS; SUBCUTANEOUS EVERY 5 MIN PRN
Status: DISCONTINUED | OUTPATIENT
Start: 2023-02-21 | End: 2023-02-21 | Stop reason: HOSPADM

## 2023-02-21 RX ORDER — SODIUM CHLORIDE, SODIUM LACTATE, POTASSIUM CHLORIDE, CALCIUM CHLORIDE 600; 310; 30; 20 MG/100ML; MG/100ML; MG/100ML; MG/100ML
INJECTION, SOLUTION INTRAVENOUS CONTINUOUS
Status: DISCONTINUED | OUTPATIENT
Start: 2023-02-21 | End: 2023-02-21 | Stop reason: HOSPADM

## 2023-02-21 RX ORDER — LABETALOL HYDROCHLORIDE 5 MG/ML
5 INJECTION, SOLUTION INTRAVENOUS EVERY 10 MIN PRN
Status: DISCONTINUED | OUTPATIENT
Start: 2023-02-21 | End: 2023-02-21 | Stop reason: HOSPADM

## 2023-02-21 RX ORDER — DEXAMETHASONE SODIUM PHOSPHATE 10 MG/ML
INJECTION, SOLUTION INTRAMUSCULAR; INTRAVENOUS PRN
Status: DISCONTINUED | OUTPATIENT
Start: 2023-02-21 | End: 2023-02-21

## 2023-02-21 RX ORDER — HALOPERIDOL 5 MG/ML
1 INJECTION INTRAMUSCULAR
Status: DISCONTINUED | OUTPATIENT
Start: 2023-02-21 | End: 2023-02-21 | Stop reason: HOSPADM

## 2023-02-21 RX ORDER — LIDOCAINE HYDROCHLORIDE 10 MG/ML
INJECTION, SOLUTION INFILTRATION; PERINEURAL PRN
Status: DISCONTINUED | OUTPATIENT
Start: 2023-02-21 | End: 2023-02-21

## 2023-02-21 RX ORDER — ONDANSETRON 2 MG/ML
INJECTION INTRAMUSCULAR; INTRAVENOUS PRN
Status: DISCONTINUED | OUTPATIENT
Start: 2023-02-21 | End: 2023-02-21

## 2023-02-21 RX ADMIN — Medication 3 G: at 08:18

## 2023-02-21 RX ADMIN — ONDANSETRON 4 MG: 2 INJECTION INTRAMUSCULAR; INTRAVENOUS at 08:55

## 2023-02-21 RX ADMIN — SODIUM CHLORIDE, POTASSIUM CHLORIDE, SODIUM LACTATE AND CALCIUM CHLORIDE: 600; 310; 30; 20 INJECTION, SOLUTION INTRAVENOUS at 07:30

## 2023-02-21 RX ADMIN — PROPOFOL 50 MG: 10 INJECTION, EMULSION INTRAVENOUS at 08:48

## 2023-02-21 RX ADMIN — GLYCOPYRROLATE 0.2 MG: 0.2 INJECTION, SOLUTION INTRAMUSCULAR; INTRAVENOUS at 08:27

## 2023-02-21 RX ADMIN — LIDOCAINE HYDROCHLORIDE 30 ML: 10 INJECTION, SOLUTION INFILTRATION; PERINEURAL at 08:27

## 2023-02-21 RX ADMIN — MIDAZOLAM 2 MG: 1 INJECTION INTRAMUSCULAR; INTRAVENOUS at 08:18

## 2023-02-21 RX ADMIN — FENTANYL CITRATE 100 MCG: 50 INJECTION, SOLUTION INTRAMUSCULAR; INTRAVENOUS at 08:27

## 2023-02-21 RX ADMIN — ACETAMINOPHEN 975 MG: 325 TABLET ORAL at 08:07

## 2023-02-21 RX ADMIN — PROPOFOL 200 MG: 10 INJECTION, EMULSION INTRAVENOUS at 08:27

## 2023-02-21 RX ADMIN — DEXAMETHASONE SODIUM PHOSPHATE 10 MG: 10 INJECTION, SOLUTION INTRAMUSCULAR; INTRAVENOUS at 08:27

## 2023-02-21 ASSESSMENT — ACTIVITIES OF DAILY LIVING (ADL)
ADLS_ACUITY_SCORE: 18
ADLS_ACUITY_SCORE: 18
ADLS_ACUITY_SCORE: 35

## 2023-02-21 NOTE — ANESTHESIA CARE TRANSFER NOTE
Patient: Ana Ch    Procedure: Procedure(s):  CYSTOSCOPY AND TRANSURETHRAL RESECTION OF BLADDER TUMORS       Diagnosis: Francois hematuria [R31.0]  Diagnosis Additional Information: No value filed.    Anesthesia Type:   General     Note:    Oropharynx: oropharynx clear of all foreign objects  Level of Consciousness: drowsy  Oxygen Supplementation: face mask  Level of Supplemental Oxygen (L/min / FiO2): 8  Independent Airway: airway patency satisfactory and stable  Dentition: dentition unchanged  Vital Signs Stable: post-procedure vital signs reviewed and stable  Report to RN Given: handoff report given  Patient transferred to: PACU    Handoff Report: Identifed the Patient, Identified the Reponsible Provider, Reviewed the pertinent medical history, Discussed the surgical course, Reviewed Intra-OP anesthesia mangement and issues during anesthesia, Set expectations for post-procedure period and Allowed opportunity for questions and acknowledgement of understanding      Vitals:  Vitals Value Taken Time   /62 02/21/23 0910   Temp     Pulse 86 02/21/23 0910   Resp 14 02/21/23 0910   SpO2 100 % 02/21/23 0910   Vitals shown include unvalidated device data.    Electronically Signed By: Arianna Patel CRNA, APRN CRNA  February 21, 2023  9:12 AM

## 2023-02-21 NOTE — OP NOTE
Operative Note    Name:  Ana Ch  [place] [facility] [place]  Procedure Date:  2/21/2023  PCP:  Sky Dyer      [unfilled]    Pre-Procedure Diagnosis:  Francois hematuria [R31.0]     Post-Procedure Diagnosis:    * No post-op diagnosis entered *    Surgeon(s):  Gaston Villarreal MD    Anesthesia Type:  General    Past Medical History:   Diagnosis Date     Anxiety      Bladder tumor      Chronic kidney disease      Depression      Exposure to human immunodeficiency virus      Foot pain, bilateral      Gout      Hip pain      HTN (hypertension)      Hypertension     Controled with medication     Obesity      Prostatitis      Renal disease      Urinary retention      Urinary urgency        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     Priority: Medium     Added automatically from request for surgery [id number]       HTN (hypertension) 10/02/2020     Priority: Medium     CKD (chronic kidney disease) stage 4, GFR 15-29 ml/min (H) 10/02/2020     Priority: Medium     Gout 03/10/2020     Priority: Medium     Mixed anxiety and depressive disorder 03/10/2020     Priority: Medium     Obesity 03/10/2020     Priority: Medium       Findings:  Edematous and erythematous wall of the bladder.  Calcification versus tumor on the floor of the bladder and lateral walls.    Operative Report:    After obtaining satisfactory anesthesia patient placed lithotomy position.  Prepped and draped usual manner.  Cystoscopy was performed.  Bladder was very edematous.  There were white lesions on the posterior and left lateral wall question calcification question tumor.  These areas were resected and the base fulgurated.  Biopsy the prostate urethra was also obtained.  Tumors were greater than 2 cm.  The completion the case no further bleeding was noted.   Saleh catheter was placed.    Estimated Blood Loss:   5 cc    Specimens:    ID Type Source Tests Collected by Time Destination   1 : BLADDER TUMOR Tissue Urinary Bladder SURGICAL PATHOLOGY EXAM Gaston Mcleod MD 2/21/2023  8:53 AM           Drains:   Peritoneal Dialysis Catheter Intermittent (Active)       Urethral Catheter 02/21/23 Latex;Double-lumen 20 fr (Active)       Complications:    None    GASTON MCLEOD MD     Date: 2/21/2023  Time: 8:57 AM

## 2023-02-21 NOTE — ANESTHESIA POSTPROCEDURE EVALUATION
Patient: Ana hC    Procedure: Procedure(s):  CYSTOSCOPY AND TRANSURETHRAL RESECTION OF BLADDER TUMORS       Anesthesia Type:  General    Note:     Postop Pain Control: Uneventful            Sign Out: Well controlled pain   PONV: No   Neuro/Psych: Uneventful            Sign Out: Acceptable/Baseline neuro status   Airway/Respiratory: Uneventful            Sign Out: Acceptable/Baseline resp. status   CV/Hemodynamics: Uneventful            Sign Out: Acceptable CV status; No obvious hypovolemia; No obvious fluid overload   Other NRE: NONE   DID A NON-ROUTINE EVENT OCCUR? No           Last vitals:  Vitals Value Taken Time   /70 02/21/23 1000   Temp 36.7  C (98  F) 02/21/23 0945   Pulse 84 02/21/23 1001   Resp 24 02/21/23 1000   SpO2 98 % 02/21/23 1001   Vitals shown include unvalidated device data.    Electronically Signed By: Chelo Floyd MD  February 21, 2023  10:36 AM

## 2023-02-21 NOTE — ANESTHESIA PREPROCEDURE EVALUATION
Anesthesia Pre-Procedure Evaluation    Patient: Ana Ch   MRN: [id number] : 1990        Procedure : Procedure(s):  CYSTOSCOPY AND TRANSURETHRAL RESECTION OF BLADDER TUMORS          Past Medical History:   Diagnosis Date     Anxiety      Bladder tumor      Chronic kidney disease      Depression      Exposure to human immunodeficiency virus      Foot pain, bilateral      Gout      Hip pain      HTN (hypertension)      Hypertension     Controled with medication     Obesity      Prostatitis      Renal disease      Urinary retention      Urinary urgency       Past Surgical History:   Procedure Laterality Date     REPAIR PTOSIS Bilateral      REPAIR PTOSIS Right 2021    Procedure: Right upper ptosis repair;  Surgeon: Hair Breen MD;  Location: [facility] OR     REPAIR PTOSIS Right 2022    Procedure: right upper eyelid ptosis repair;  Surgeon: Hair Breen MD;  Location: [facility] OR      Allergies   Allergen Reactions     Nifedipine      Needs to have osmotic extended release Procardia XL otherwise gets flushing      Social History     Tobacco Use     Smoking status: Never     Smokeless tobacco: Never   Substance Use Topics     Alcohol use: Yes     Comment: One drink per week      Wt Readings from Last 1 Encounters:   23 127.9 kg (281 lb 14.4 oz)        Anesthesia Evaluation            ROS/MED HX  ENT/Pulmonary:       Neurologic:       Cardiovascular:     (+) hypertension-----    METS/Exercise Tolerance:     Hematologic:       Musculoskeletal:       GI/Hepatic:       Renal/Genitourinary:     (+) renal disease, type: CRI,     Endo:     (+) Obesity,     Psychiatric/Substance Use:     (+) psychiatric history anxiety and depression     Infectious Disease:       Malignancy:       Other:               OUTSIDE LABS:  CBC:   Lab Results   Component Value Date    WBC 10.8 10/02/2020    HGB 14.3 10/02/2020    HCT 43.5 10/02/2020     10/02/2020     BMP: No results found for: NA,  POTASSIUM, CHLORIDE, CO2, BUN, CR, GLC  COAGS: No results found for: PTT, INR, FIBR  POC: No results found for: BGM, HCG, HCGS  HEPATIC: No results found for: ALBUMIN, PROTTOTAL, ALT, AST, GGT, ALKPHOS, BILITOTAL, BILIDIRECT, IVETH  OTHER: No results found for: PH, LACT, A1C, JOSHUA, PHOS, MAG, LIPASE, AMYLASE, TSH, T4, T3, CRP, SED    Anesthesia Plan    ASA Status:  3      Anesthesia Type: General.     - Airway: LMA              Consents            Postoperative Care            Comments:                Chelo Flyod MD

## 2023-02-21 NOTE — ANESTHESIA PROCEDURE NOTES
Airway       Patient location during procedure: OR  Staff -        Anesthesiologist:  Chelo Floyd MD       Performed By: anesthesiologist  Consent for Airway        Urgency: elective  Indications and Patient Condition       Indications for airway management: joe-procedural       Induction type:intravenous       Mask difficulty assessment: 0 - not attempted    Final Airway Details       Final airway type: supraglottic airway    Supraglottic Airway Details        Type: LMA       Brand: Ambu AuraGain       LMA size: 4    Post intubation assessment        Placement verified by: capnometry, equal breath sounds and chest rise        Number of attempts at approach: 1       Number of other approaches attempted: 0       Ease of procedure: easy       Dentition: Unchanged

## 2023-02-23 LAB
PATH REPORT.COMMENTS IMP SPEC: NORMAL
PATH REPORT.FINAL DX SPEC: NORMAL
PATH REPORT.GROSS SPEC: NORMAL
PATH REPORT.MICROSCOPIC SPEC OTHER STN: NORMAL
PATH REPORT.RELEVANT HX SPEC: NORMAL
PHOTO IMAGE: NORMAL

## 2023-02-28 ENCOUNTER — TRANSFERRED RECORDS (OUTPATIENT)
Dept: HEALTH INFORMATION MANAGEMENT | Facility: CLINIC | Age: 33
End: 2023-02-28

## 2023-03-24 ENCOUNTER — TRANSFERRED RECORDS (OUTPATIENT)
Dept: HEALTH INFORMATION MANAGEMENT | Facility: CLINIC | Age: 33
End: 2023-03-24

## 2023-03-25 ENCOUNTER — OFFICE VISIT (OUTPATIENT)
Dept: FAMILY MEDICINE | Facility: CLINIC | Age: 33
End: 2023-03-25
Payer: COMMERCIAL

## 2023-03-25 VITALS
WEIGHT: 265 LBS | HEART RATE: 87 BPM | SYSTOLIC BLOOD PRESSURE: 150 MMHG | BODY MASS INDEX: 40.29 KG/M2 | OXYGEN SATURATION: 96 % | RESPIRATION RATE: 14 BRPM | TEMPERATURE: 98.5 F | DIASTOLIC BLOOD PRESSURE: 78 MMHG

## 2023-03-25 DIAGNOSIS — K62.89 RECTAL PAIN: Primary | ICD-10-CM

## 2023-03-25 DIAGNOSIS — N41.9 PROSTATE INFECTION: ICD-10-CM

## 2023-03-25 DIAGNOSIS — N30.00 ACUTE CYSTITIS WITHOUT HEMATURIA: ICD-10-CM

## 2023-03-25 LAB
ALBUMIN UR-MCNC: >=300 MG/DL
APPEARANCE UR: CLEAR
BACTERIA #/AREA URNS HPF: ABNORMAL /HPF
BILIRUB UR QL STRIP: NEGATIVE
COLOR UR AUTO: YELLOW
GLUCOSE UR STRIP-MCNC: NEGATIVE MG/DL
HGB UR QL STRIP: ABNORMAL
KETONES UR STRIP-MCNC: NEGATIVE MG/DL
LEUKOCYTE ESTERASE UR QL STRIP: ABNORMAL
NITRATE UR QL: NEGATIVE
PH UR STRIP: 7.5 [PH] (ref 5–8)
RBC #/AREA URNS AUTO: ABNORMAL /HPF
SP GR UR STRIP: 1.02 (ref 1–1.03)
SQUAMOUS #/AREA URNS AUTO: ABNORMAL /LPF
UROBILINOGEN UR STRIP-ACNC: 0.2 E.U./DL
WBC #/AREA URNS AUTO: ABNORMAL /HPF

## 2023-03-25 PROCEDURE — 99214 OFFICE O/P EST MOD 30 MIN: CPT | Performed by: PHYSICIAN ASSISTANT

## 2023-03-25 PROCEDURE — 87591 N.GONORRHOEAE DNA AMP PROB: CPT | Performed by: PHYSICIAN ASSISTANT

## 2023-03-25 PROCEDURE — 87491 CHLMYD TRACH DNA AMP PROBE: CPT | Performed by: PHYSICIAN ASSISTANT

## 2023-03-25 PROCEDURE — 81001 URINALYSIS AUTO W/SCOPE: CPT | Performed by: PHYSICIAN ASSISTANT

## 2023-03-25 RX ORDER — CIPROFLOXACIN 250 MG/1
250 TABLET, FILM COATED ORAL 2 TIMES DAILY
Qty: 6 TABLET | Refills: 0 | Status: SHIPPED | OUTPATIENT
Start: 2023-03-25 | End: 2023-03-28

## 2023-03-25 RX ORDER — DOXYCYCLINE 100 MG/1
100 CAPSULE ORAL 2 TIMES DAILY
Qty: 20 CAPSULE | Refills: 0 | Status: SHIPPED | OUTPATIENT
Start: 2023-03-25 | End: 2023-04-04

## 2023-03-25 ASSESSMENT — ENCOUNTER SYMPTOMS
CONSTITUTIONAL NEGATIVE: 1
GASTROINTESTINAL NEGATIVE: 1

## 2023-03-25 NOTE — PATIENT INSTRUCTIONS
Take Cipro two times per day for 3 days for UTI, if no improvement after, start Doxycycline twice a day for 10 days    Follow up with PCP to discuss Doxy long term use for recurrent prostatitis

## 2023-03-25 NOTE — PROGRESS NOTES
Assessment & Plan       ICD-10-CM    1. Rectal pain  K62.89 NEISSERIA GONORRHOEA PCR     CHLAMYDIA TRACHOMATIS PCR     UA macro with reflex to Microscopic and Culture - Clinc Collect     UA Microscopic with Reflex to Culture     doxycycline hyclate (VIBRAMYCIN) 100 MG capsule      2. Prostate infection  N41.9 doxycycline hyclate (VIBRAMYCIN) 100 MG capsule      3. Acute cystitis without hematuria  N30.00 ciprofloxacin (CIPRO) 250 MG tablet           Patient instructions:    Patient Instructions   Take Cipro two times per day for 3 days for UTI, if no improvement after, start Doxycycline twice a day for 10 days    Follow up with PCP to discuss Doxy long term use for recurrent prostatitis    Medical decision making: UA shows UTI.  Because of history patient requested doxycycline for his rectal pain as this is the only thing that is helped him in the past which his urologist also requested.  Swabs pending.  He is to take Cipro first twice a day for 3 days and then start his doxycycline.  This will be spread out due to his renal disease.  He reports he goes to dialysis regularly and has not missed appointments.    Return if symptoms worsen or fail to improve, for Follow up.    At the end of the encounter, I discussed results, diagnosis, medications. Discussed red flags for immediate return to clinic/ER, as well as indications for follow up if no improvement. Patient understood and agreed to plan. Patient was stable for discharge.    Michelle Perez is a 33 year old male who presents to clinic today for the following health issues:  Chief Complaint   Patient presents with     STD     Thinks has prostate problem but told to come have anal swab done for STD     Ana presents with reports of recurrent prostate infection. He reports his urologist stated it was possible gonorrhea infection. He reports repeat prostate exam. He is taking his Tamsulosin again. Dialysis T Th Sat.          Review of Systems    Constitutional: Negative.    Gastrointestinal: Negative.    Genitourinary: Positive for urgency.        Rectal pain       Problem List:  2022-02: Morbid obesity (H)  2021-11: Focal segmental glomerulosclerosis  2021-09: Congenital ptosis of eyelid  2020-10: HTN (hypertension)  2020-10: CKD (chronic kidney disease) stage 4, GFR 15-29 ml/min (H)  2020-03: Gout  2020-03: Mixed anxiety and depressive disorder  2020-03: Obesity      Past Medical History:   Diagnosis Date     Anxiety      Bladder tumor      Chronic kidney disease      Depression      Exposure to human immunodeficiency virus      Foot pain, bilateral      Gout      Hip pain      HTN (hypertension)      Hypertension     Controled with medication     Obesity      Prostatitis      Renal disease      Urinary retention      Urinary urgency        Social History     Tobacco Use     Smoking status: Never     Smokeless tobacco: Never   Substance Use Topics     Alcohol use: Yes     Comment: One drink per week           Objective    BP (!) 150/78   Pulse 87   Temp 98.5  F (36.9  C) (Oral)   Resp 14   Wt 120.2 kg (265 lb)   SpO2 96%   BMI 40.29 kg/m    Physical Exam  Constitutional:       Appearance: Normal appearance.   HENT:      Head: Normocephalic and atraumatic.   Musculoskeletal:         General: Normal range of motion.      Cervical back: Normal range of motion and neck supple.   Skin:     General: Skin is warm and dry.   Neurological:      General: No focal deficit present.      Mental Status: He is alert and oriented to person, place, and time.   Psychiatric:         Mood and Affect: Mood normal.         Behavior: Behavior normal.         Thought Content: Thought content normal.         Judgment: Judgment normal.              Job Beck PA-C

## 2023-03-26 LAB
C TRACH DNA SPEC QL NAA+PROBE: NEGATIVE
N GONORRHOEA DNA SPEC QL NAA+PROBE: NEGATIVE

## 2023-03-27 ENCOUNTER — TRANSFERRED RECORDS (OUTPATIENT)
Dept: HEALTH INFORMATION MANAGEMENT | Facility: CLINIC | Age: 33
End: 2023-03-27

## 2023-04-02 ENCOUNTER — HEALTH MAINTENANCE LETTER (OUTPATIENT)
Age: 33
End: 2023-04-02

## 2023-04-13 ENCOUNTER — TRANSFERRED RECORDS (OUTPATIENT)
Dept: HEALTH INFORMATION MANAGEMENT | Facility: CLINIC | Age: 33
End: 2023-04-13

## 2023-04-27 ENCOUNTER — TRANSFERRED RECORDS (OUTPATIENT)
Dept: HEALTH INFORMATION MANAGEMENT | Facility: CLINIC | Age: 33
End: 2023-04-27

## 2023-04-28 ENCOUNTER — TRANSFERRED RECORDS (OUTPATIENT)
Dept: HEALTH INFORMATION MANAGEMENT | Facility: CLINIC | Age: 33
End: 2023-04-28

## 2023-05-02 ENCOUNTER — TRANSFERRED RECORDS (OUTPATIENT)
Dept: HEALTH INFORMATION MANAGEMENT | Facility: CLINIC | Age: 33
End: 2023-05-02
Payer: COMMERCIAL

## 2023-05-05 ENCOUNTER — REFERRAL (OUTPATIENT)
Dept: TRANSPLANT | Facility: CLINIC | Age: 33
End: 2023-05-05

## 2023-05-05 DIAGNOSIS — Z76.82 ORGAN TRANSPLANT CANDIDATE: ICD-10-CM

## 2023-05-05 DIAGNOSIS — N05.1 FSGS (FOCAL SEGMENTAL GLOMERULOSCLEROSIS): ICD-10-CM

## 2023-05-05 DIAGNOSIS — N18.4 CHRONIC KIDNEY DISEASE, STAGE 4, SEVERELY DECREASED GFR (H): Primary | ICD-10-CM

## 2023-05-05 DIAGNOSIS — N18.6 END STAGE RENAL DISEASE (H): ICD-10-CM

## 2023-05-05 DIAGNOSIS — I10 ESSENTIAL HYPERTENSION: ICD-10-CM

## 2023-05-05 DIAGNOSIS — Z01.818 PRE-TRANSPLANT EVALUATION FOR KIDNEY TRANSPLANT: ICD-10-CM

## 2023-05-05 NOTE — LETTER
May 10, 2023     Ana Ch  [address]  [place] [address]          Dear Ana,    Thank you for your interest in the Transplant Center at [facility]. We look forward to being a part of your care team and assisting you through the transplant process.    As we discussed, your transplant coordinator is Kaycee Burr (Kidney). You may call your coordinator at any time with questions or concerns. Your first scheduled call will be on 05/23/2023 between 1:00 pm and 3:00 pm.  If you need to reschedule, please call [phone].    Please complete the following.    Fill out and return the enclosed forms  Authorization for Electronic Communication  Authorization to Discuss Protected Health Information  Authorization for Release of Protected Health Information    Sign up for:  [employer], access to your electronic medical record (see enclosed pamphlet)  [employer], a transplant education website    You can use these tools to learn more about your transplant, communicate with your care team, and track your medical details      Sincerely,      Solid Organ Transplant  [facility]    cc: PCP and Nephrologist

## 2023-05-05 NOTE — LETTER
Ana Ch  [address]  [place] [address]                May 10, 2023                                                                                        MEDICAL RECORDS REQUEST    [facility] Records Request                      Facility: [facility] [place] [facility]    Thank you for referring your patient to the [facility], in order to process the referral we will need the following information;    Demographics  4925 form   Immunizations records  Providers Progress notes, (last 3 note)      Please call our office at [phone] if you have any questions or concerns.                Please fax all paper records to [phone] within 1-3 business days.      Thank you,   The SOT Referral Intake Team     [facility]  Solid Organ Transplant Office  [address]  [place] [address]

## 2023-05-08 ENCOUNTER — TRANSFERRED RECORDS (OUTPATIENT)
Dept: HEALTH INFORMATION MANAGEMENT | Facility: CLINIC | Age: 33
End: 2023-05-08
Payer: COMMERCIAL

## 2023-05-10 ENCOUNTER — DOCUMENTATION ONLY (OUTPATIENT)
Dept: TRANSPLANT | Facility: CLINIC | Age: 33
End: 2023-05-10
Payer: COMMERCIAL

## 2023-05-10 ENCOUNTER — TRANSFERRED RECORDS (OUTPATIENT)
Dept: HEALTH INFORMATION MANAGEMENT | Facility: CLINIC | Age: 33
End: 2023-05-10
Payer: COMMERCIAL

## 2023-05-10 VITALS — HEIGHT: 68 IN | WEIGHT: 247.5 LBS | BODY MASS INDEX: 37.51 KG/M2

## 2023-05-10 NOTE — TELEPHONE ENCOUNTER
PCP: NILS Zapata  Referring Provider: Self   Referring Diagnosis: CKD Stage 4      GFR/Date: 13 (03/13/2023)    Is patient under the age of 65? Yes  Is patient diabetic? No   Is patient on insulin? No  Was patient offered a pancreas transplant referral? No    Is patient in a group home/assisted living? No  Does patient have a guardian? No    Referral intake process completed.  Patient is aware that after financial approval is received, medical records will be requested.   Patient confirmed for a callback from transplant coordinator on 05/23/2023. (within 2 weeks)  Tentative evaluation date 06/13/2023 slot #2 (within 4 weeks) if appointment is virtual, does patient have capabilities of setting this up? Yes, appointment is virtual.     Confirmed coordinator will discuss evaluation process in more detail at the time of their call.   Patient is aware of the need to arrange age appropriate cancer screening, vaccinations, and dental care.    Reminded patient to complete questionnaire, complete medical records release, and review packet prior to evaluation visit.    Assessed patient for special needs (ie-wheelchair, assistance, guardian, and ): None   Patient instructed to call [phone] with questions.     Patient gave verbal consent during intake call to obtain medical records and documents outside of [facility]: Yes

## 2023-05-11 ENCOUNTER — TRANSFERRED RECORDS (OUTPATIENT)
Dept: HEALTH INFORMATION MANAGEMENT | Facility: CLINIC | Age: 33
End: 2023-05-11
Payer: COMMERCIAL

## 2023-05-23 NOTE — TELEPHONE ENCOUNTER
"Contacted patient and introduced myself as their Transplant Coordinator, also introduced the role of the Transplant Coordinator in the transplant process.  Explained the purpose of this call including reviewing next steps and answering questions.    Confirmed Referring Provider, Dialysis Center, and Primary Care Physician. Notified patient of the importance of continued communication with referring providers and primary care physicians.    Reviewed components of transplant evaluation process including necessary appointments, tests, and procedures.    Answered questions for patient regarding evaluation, provided my name and contact information and requested they call with any additional questions.    Determined that patient would like additional information regarding transplant by:     Drop Down choices: Mail, Email, MyChart, Phone Call   Encourage MyChart   Notified patients that they will hear from a Transplant  to schedule evaluation.       Reviewed pt's chart for pre-kidney transplant evaluation planning. Pt lives in [place]. Pt has ESKD d/t due to biopsy-proven secondary focal segmental glomerulosclerosis presumed due to obesity.  He has been on dialysis since 2/2023, follows w/ Dr. Alisha Mcwilliams and was listed at [facility] 3/2021 (currently inactive d/t BMI), he has been told he is too \"urologically complex\" to continue w/ transplant through their program and is looking to transfer his wait time here.  He does have a donor that started evaluation w/ [facility], but will also be transferring to our center.  Other hx includes HTN, Anemia, Gout, and rare case of polypoid cystitis (follows w/ Dr. Villarreal at [facility] - would be willing to transfer Urological care here) is s/p TURBT 2/21/23 and cysto w/ retrograde pyelogram w/ biopsies (negative for malignancy) 4/27/23.  Chart mentions stomach mass and referral to [facility] for further evaluation (records requested). BMI 38- discussed he will likely need to " lose weight.  Dental: UTD.  Pt is not a smoker, rarely consumes alcohol, and does not use recreational drugs. Pt is indepednet w/ ADLs.  Pt lives alone, states mother will be his support following transplant. Pt has potential living donors. Pt has received the COVID vaccine.     I also introduced [employer] and asked pt to create an account and view pre-kidney transplant videos for review with me following evaluation. Confirmed STD 6/13/23 for virtual PKE, pt understands he will need to be seen in -person as well. Informed pt they will hear from scheduling to arrange the evaluation. Smartset orders entered.

## 2023-06-12 NOTE — PROGRESS NOTES
Pt evaluated via billable telephone visit d/t COVID-19 restrictions. Time spent: *** min  Provider location: onsite ([facility] [place]) // offsite ***    [facility] Solid Organ Transplant  Outpatient MNT: Kidney Transplant Evaluation    Current BMI: *** (HT *** in, WT *** lbs/*** kg)  BMI guideline for kidney transplant up to a BMI of 40 / per surgeon discretion     Frailty Assessment -- ***     Recommended Interventions to Address Frailty:  ***     Time Spent: *** minutes  Visit Type: Initial   Referring Physician: ***  Pt accompanied by: ***    History of previous txp: none   Dialysis: yes, 2/2023     Nutrition Assessment  ***listed inactive at [facility] 3/2021- high BMI    - Appetite: ***  - Food allergies/intolerances: ***  - Meal prep & grocery shopping: ***  - Previous RD education: ***  - Issues chewing or swallowing: ***  - N/V/D/C: ***  - Food access concerns: ***    Vitamins, Supplements, Pertinent Meds: ***  Herbal Medicines/Supplements: ***    Edema: ***    Weight hx: ***    Diet Recall  Breakfast ***   Lunch ***   Dinner ***   Snacks ***   Beverages ***   Alcohol ***   Dining out ***     Physical Activity  ***     Labs  ***    Malnutrition (delete) ***  % Intake: {% Intake :538638}  % Weight Loss: {% Weight Loss :644062}  Subcutaneous Fat Loss: ***  Muscle Loss: ***  Fluid Accumulation/Edema: {Fluid/Edema :779679}  Malnutrition Diagnosis: {Malnutrition/Diagnosis:037253} in the context of ***    Anthropometrics (delete)***  IBW/%IBW: ***/***   Dosing wt: *** lbs/*** kg    Estimated Nutrition Needs (delete) ***    Nutrition Diagnosis  ***No nutrition diagnosis identified at this time     Nutrition Intervention  Nutrition education provided:  Discussed sodium intake (low sodium foods and drinks, seasoning food without salt and tips for low sodium diet).  ***    Reviewed post txp diet guidelines in brief (will review in further detail post txp):  (1) Review of proper food safety measures d/t immunosuppressant therapy  post-op and increased risk for food-borne illness    (2) Avoid the following post txp d/t risk for rejection, unknown effects on the organs, and/or potential interactions with immunosuppressants:  - Herbal, Chinese, holistic, chiropractic, natural, alternative medicines and supplements  - Detoxes and cleanses  - Weight loss pills  - Protein powders or other products with extracts or herbs (ie green tea extract)    (3) Med regimen and possible side effects    Patient Understanding: Pt verbalized understanding of education provided.  Expected Engagement: ***  Follow-Up Plans: PRN     Nutrition Goals  No nutrition goals identified at this time   ***    Nadine Sandy, RD, LD, CCTD

## 2023-06-13 ENCOUNTER — VIRTUAL VISIT (OUTPATIENT)
Dept: TRANSPLANT | Facility: CLINIC | Age: 33
End: 2023-06-13
Attending: NURSE PRACTITIONER
Payer: COMMERCIAL

## 2023-06-13 ENCOUNTER — APPOINTMENT (OUTPATIENT)
Dept: TRANSPLANT | Facility: CLINIC | Age: 33
End: 2023-06-13
Attending: INTERNAL MEDICINE
Payer: COMMERCIAL

## 2023-06-13 DIAGNOSIS — Z01.818 PRE-TRANSPLANT EVALUATION FOR KIDNEY TRANSPLANT: ICD-10-CM

## 2023-06-13 DIAGNOSIS — I10 ESSENTIAL HYPERTENSION: ICD-10-CM

## 2023-06-13 DIAGNOSIS — N18.4 CHRONIC KIDNEY DISEASE, STAGE 4, SEVERELY DECREASED GFR (H): ICD-10-CM

## 2023-06-13 DIAGNOSIS — Z76.82 ORGAN TRANSPLANT CANDIDATE: ICD-10-CM

## 2023-06-13 DIAGNOSIS — N18.6 END STAGE RENAL DISEASE (H): ICD-10-CM

## 2023-06-13 DIAGNOSIS — N05.1 FSGS (FOCAL SEGMENTAL GLOMERULOSCLEROSIS): ICD-10-CM

## 2023-06-13 DIAGNOSIS — Z53.9 ERRONEOUS ENCOUNTER--DISREGARD: Primary | ICD-10-CM

## 2023-06-13 NOTE — Clinical Note
6/13/2023         RE: Ana Ch  [address]  [place] [address]        Dear Colleague,    Thank you for referring your patient, Ana Ch, to the [facility] TRANSPLANT [facility]. Please see a copy of my visit note below.    Pt evaluated via billable telephone visit d/t COVID-19 restrictions. Time spent: *** min  [place] [place] ***    [facility] Solid Organ Transplant  Outpatient MNT: Kidney Transplant Evaluation    Current BMI: *** (HT *** in, WT *** lbs/*** kg)  BMI guideline for kidney transplant up to a BMI of 40 / per surgeon discretion     Frailty Assessment -- ***     Recommended Interventions to Address Frailty:  ***     Time Spent: *** minutes  Visit Type: Initial   Referring Physician: ***  Pt accompanied by: ***    History of previous txp: none   Dialysis: yes, 2/2023     Nutrition Assessment  ***listed inactive at [facility] 3/2021- high BMI    - Appetite: ***  - Food allergies/intolerances: ***  - Meal prep & grocery shopping: ***  - Previous RD education: ***  - Issues chewing or swallowing: ***  - N/V/D/C: ***  - Food access concerns: ***    Vitamins, Supplements, Pertinent Meds: ***  Herbal Medicines/Supplements: ***    Edema: ***    Weight hx: ***    Diet Recall  Breakfast ***   Lunch ***   Dinner ***   Snacks ***   Beverages ***   Alcohol ***   Dining out ***     Physical Activity  ***     Labs  ***    Malnutrition (delete) ***  % Intake: {% Intake :855817}  % Weight Loss: {% Weight Loss :426967}  Subcutaneous Fat Loss: ***  Muscle Loss: ***  Fluid Accumulation/Edema: {Fluid/Edema :601325}  Malnutrition Diagnosis: {Malnutrition/Diagnosis:970577} in the context of ***    Anthropometrics (delete)***  IBW/%IBW: ***/***   Dosing wt: *** lbs/*** kg    Estimated Nutrition Needs (delete) ***    Nutrition Diagnosis  ***No nutrition diagnosis identified at this time     Nutrition Intervention  Nutrition education provided:  Discussed sodium  intake (low sodium foods and drinks, seasoning food without salt and tips for low sodium diet).  ***    Reviewed post txp diet guidelines in brief (will review in further detail post txp):  (1) Review of proper food safety measures d/t immunosuppressant therapy post-op and increased risk for food-borne illness    (2) Avoid the following post txp d/t risk for rejection, unknown effects on the organs, and/or potential interactions with immunosuppressants:  - Herbal, Chinese, holistic, chiropractic, natural, alternative medicines and supplements  - Detoxes and cleanses  - Weight loss pills  - Protein powders or other products with extracts or herbs (ie green tea extract)    (3) Med regimen and possible side effects    Patient Understanding: Pt verbalized understanding of education provided.  Expected Engagement: ***  Follow-Up Plans: PRN     Nutrition Goals  No nutrition goals identified at this time   ***    Nadine Sandy, RD, LD, CCTD                                          Again, thank you for allowing me to participate in the care of your patient.        Sincerely,        AURA

## 2023-06-13 NOTE — PROGRESS NOTES
**Patient several hours late to PKE, will be rescheduled. Notes left in chart for review.     # ESKD: on dialysis since early 2023. He has been listed at [facility], but was recently removed (urology? High BMI?)    # Gross Hematuria  # Nighttime Incontinence   - Followed by [facility] s/p TURBT April 2023 with benign pathology. Unclear if CT and/or urodynamics done    # Gastric Lesion: noted on CT but not identified on May 2023 EGD. EUS recommended.           Kidney Disease Hx:        Started following with nephrology in early 2020 for elevated creatinine in the 2's mg/dl and nephrotic-range proteinuria (without nephrotic syndrome), but reports previously being told his creatinine was elevated several years prior. History of chronic NSAID (ibuprofen) use for ? (600 mg most days for 10 years, stopped ?) and significant gout (ibuprofen and colchicine use previously). Renal US with  Increased echogenicity. July 2020 native kidney biopsy showed ?secondary FSGS thought likely 2/2 obesity. Also some mention regarding concern for elevated creatinine in the setting of tenofovir for PREP a few years ago. Had progressive decline and started dialysis early 2023, Previously listed at [facility], was on hold due to high BMI, recently removed

## 2023-06-13 NOTE — LETTER
6/13/2023         RE: Ana Ch  [address]  [place] [address]        Dear Colleague,    Thank you for referring your patient, Ana Ch, to the [facility] TRANSPLANT [facility]. Please see a copy of my visit note below.    **Patient several hours late to PKE, will be rescheduled. Notes left in chart for review.     # ESKD: on dialysis since early 2023. He has been listed at [facility], but was recently removed (urology? High BMI?)    # Gross Hematuria  # Nighttime Incontinence   - Followed by [facility] s/p TURBT April 2023 with benign pathology. Unclear if CT and/or urodynamics done    # Gastric Lesion: noted on CT but not identified on May 2023 EGD. EUS recommended.           Kidney Disease Hx:        Started following with nephrology in early 2020 for elevated creatinine in the 2's mg/dl and nephrotic-range proteinuria (without nephrotic syndrome), but reports previously being told his creatinine was elevated several years prior. History of chronic NSAID (ibuprofen) use for ? (600 mg most days for 10 years, stopped ?) and significant gout (ibuprofen and colchicine use previously). Renal US with  Increased echogenicity. July 2020 native kidney biopsy showed ?secondary FSGS thought likely 2/2 obesity. Also some mention regarding concern for elevated creatinine in the setting of tenofovir for PREP a few years ago. Had progressive decline and started dialysis early 2023, Previously listed at [facility], was on hold due to high BMI, recently removed         Again, thank you for allowing me to participate in the care of your patient.        Sincerely,        PKE

## 2023-06-20 ENCOUNTER — TELEPHONE (OUTPATIENT)
Dept: TRANSPLANT | Facility: CLINIC | Age: 33
End: 2023-06-20
Payer: COMMERCIAL

## 2023-06-20 DIAGNOSIS — N18.6 END STAGE RENAL DISEASE (H): ICD-10-CM

## 2023-06-20 DIAGNOSIS — Z01.818 PRE-TRANSPLANT EVALUATION FOR KIDNEY TRANSPLANT: ICD-10-CM

## 2023-06-20 DIAGNOSIS — Z76.82 ORGAN TRANSPLANT CANDIDATE: ICD-10-CM

## 2023-06-20 DIAGNOSIS — I10 ESSENTIAL HYPERTENSION: ICD-10-CM

## 2023-06-20 NOTE — TELEPHONE ENCOUNTER
Called pt to reschedule PKE - he is able to come on 6/22/23, will enter orders STAT. Pt aware to arrive at 7:30 AM, bring support person, medication list etc. Sent summary email w/ clinic address and parking instructions. Pt had no further questions.

## 2023-06-21 LAB
ABO/RH(D): NORMAL
ANTIBODY SCREEN: NEGATIVE
SPECIMEN EXPIRATION DATE: NORMAL

## 2023-06-22 ENCOUNTER — ANCILLARY PROCEDURE (OUTPATIENT)
Dept: GENERAL RADIOLOGY | Facility: CLINIC | Age: 33
End: 2023-06-22
Attending: PHYSICIAN ASSISTANT
Payer: COMMERCIAL

## 2023-06-22 ENCOUNTER — LAB (OUTPATIENT)
Dept: LAB | Facility: CLINIC | Age: 33
End: 2023-06-22
Attending: PHYSICIAN ASSISTANT
Payer: COMMERCIAL

## 2023-06-22 ENCOUNTER — ALLIED HEALTH/NURSE VISIT (OUTPATIENT)
Dept: TRANSPLANT | Facility: CLINIC | Age: 33
End: 2023-06-22
Attending: PHYSICIAN ASSISTANT
Payer: COMMERCIAL

## 2023-06-22 ENCOUNTER — DOCUMENTATION ONLY (OUTPATIENT)
Dept: TRANSPLANT | Facility: CLINIC | Age: 33
End: 2023-06-22

## 2023-06-22 VITALS
WEIGHT: 258 LBS | DIASTOLIC BLOOD PRESSURE: 68 MMHG | HEART RATE: 82 BPM | BODY MASS INDEX: 39.1 KG/M2 | HEIGHT: 68 IN | SYSTOLIC BLOOD PRESSURE: 113 MMHG

## 2023-06-22 DIAGNOSIS — I10 ESSENTIAL HYPERTENSION: ICD-10-CM

## 2023-06-22 DIAGNOSIS — N18.6 END STAGE RENAL DISEASE (H): ICD-10-CM

## 2023-06-22 DIAGNOSIS — Z01.818 PRE-TRANSPLANT EVALUATION FOR KIDNEY TRANSPLANT: ICD-10-CM

## 2023-06-22 DIAGNOSIS — Z76.82 ORGAN TRANSPLANT CANDIDATE: ICD-10-CM

## 2023-06-22 LAB
A*: NORMAL
A*LOCUS SEROLOGIC EQUIVALENT: 2
A*LOCUS: NORMAL
A*SEROLOGIC EQUIVALENT: 23
A1 AB TITR SERPL: >256 {TITER}
ABO/RH(D): NORMAL
ABTEST METHOD: NORMAL
ALBUMIN SERPL BCG-MCNC: 4.7 G/DL (ref 3.5–5.2)
ALBUMIN UR-MCNC: 300 MG/DL
ALP SERPL-CCNC: 87 U/L (ref 40–129)
ALT SERPL W P-5'-P-CCNC: 29 U/L (ref 0–70)
ANION GAP SERPL CALCULATED.3IONS-SCNC: 17 MMOL/L (ref 7–15)
ANTIBODY TITER IGM SCREEN: NEGATIVE
APPEARANCE UR: ABNORMAL
APTT PPP: 29 SECONDS (ref 22–38)
AST SERPL W P-5'-P-CCNC: 21 U/L (ref 0–45)
B IGG TITR SERPL: >256 {TITER}
B*: NORMAL
B*LOCUS SEROLOGIC EQUIVALENT: 60
B*LOCUS: NORMAL
B*SEROLOGIC EQUIVALENT: 44
BASOPHILS # BLD AUTO: 0 10E3/UL (ref 0–0.2)
BASOPHILS NFR BLD AUTO: 0 %
BILIRUB SERPL-MCNC: 0.4 MG/DL
BILIRUB UR QL STRIP: NEGATIVE
BUN SERPL-MCNC: 36.2 MG/DL (ref 6–20)
BW-1: NORMAL
BW-2: NORMAL
C*: NORMAL
C*LOCUS SEROLOGIC EQUIVALENT: 10
C*LOCUS: NORMAL
C*SEROLOGIC EQUIVALENT: 4
CALCIUM SERPL-MCNC: 10.5 MG/DL (ref 8.6–10)
CHLORIDE SERPL-SCNC: 94 MMOL/L (ref 98–107)
CMV IGG SERPL IA-ACNC: >10 U/ML
CMV IGG SERPL IA-ACNC: ABNORMAL
COLOR UR AUTO: YELLOW
CREAT SERPL-MCNC: 7.37 MG/DL (ref 0.67–1.17)
DEPRECATED HCO3 PLAS-SCNC: 28 MMOL/L (ref 22–29)
DPA1*: NORMAL
DPA1*LOCUS: NORMAL
DPB1*: NORMAL
DPB1*LOCUS NMDP: NORMAL
DPB1*LOCUS: NORMAL
DPB1*NMDP: NORMAL
DQA1*: NORMAL
DQA1*LOCUS: NORMAL
DQB1*: NORMAL
DQB1*LOCUS SEROLOGIC EQUIVALENT: 2
DQB1*LOCUS: NORMAL
DQB1*SEROLOGIC EQUIVALENT: 6
DRB1*: NORMAL
DRB1*LOCUS SEROLOGIC EQUIVALENT: 7
DRB1*LOCUS: NORMAL
DRB1*SEROLOGIC EQUIVALENT: 13
DRB3*LOCUS SEROLOGIC EQUIVALENT: 52
DRB3*LOCUS: NORMAL
DRB4*: NORMAL
DRB4*SEROLOGIC EQUIVALENT: 53
DRSSO TEST METHOD: NORMAL
EBV VCA IGG SER IA-ACNC: 164 U/ML
EBV VCA IGG SER IA-ACNC: POSITIVE
EOSINOPHIL # BLD AUTO: 0.1 10E3/UL (ref 0–0.7)
EOSINOPHIL NFR BLD AUTO: 2 %
ERYTHROCYTE [DISTWIDTH] IN BLOOD BY AUTOMATED COUNT: 15 % (ref 10–15)
FACTOR 2 INTERPRETATION: NORMAL
FACTOR V INTERPRETATION: NORMAL
GFR SERPL CREATININE-BSD FRML MDRD: 9 ML/MIN/1.73M2
GLUCOSE SERPL-MCNC: 120 MG/DL (ref 70–99)
GLUCOSE UR STRIP-MCNC: NEGATIVE MG/DL
HBV CORE AB SERPL QL IA: NONREACTIVE
HBV SURFACE AG SERPL QL IA: NONREACTIVE
HCT VFR BLD AUTO: 38.6 % (ref 40–53)
HCV AB SERPL QL IA: NONREACTIVE
HGB BLD-MCNC: 12.6 G/DL (ref 13.3–17.7)
HGB UR QL STRIP: ABNORMAL
HIV 1+2 AB+HIV1 P24 AG SERPL QL IA: NONREACTIVE
IMM GRANULOCYTES # BLD: 0 10E3/UL
IMM GRANULOCYTES NFR BLD: 0 %
INR PPP: 1.12 (ref 0.85–1.15)
KETONES UR STRIP-MCNC: ABNORMAL MG/DL
LAB DIRECTOR COMMENTS: NORMAL
LAB DIRECTOR DISCLAIMER: NORMAL
LAB DIRECTOR INTERPRETATION: NORMAL
LAB DIRECTOR METHODOLOGY: NORMAL
LAB DIRECTOR RESULTS: NORMAL
LEUKOCYTE ESTERASE UR QL STRIP: ABNORMAL
LYMPHOCYTES # BLD AUTO: 3.2 10E3/UL (ref 0.8–5.3)
LYMPHOCYTES NFR BLD AUTO: 37 %
MCH RBC QN AUTO: 28.1 PG (ref 26.5–33)
MCHC RBC AUTO-ENTMCNC: 32.6 G/DL (ref 31.5–36.5)
MCV RBC AUTO: 86 FL (ref 78–100)
MONOCYTES # BLD AUTO: 0.5 10E3/UL (ref 0–1.3)
MONOCYTES NFR BLD AUTO: 6 %
NEUTROPHILS # BLD AUTO: 4.8 10E3/UL (ref 1.6–8.3)
NEUTROPHILS NFR BLD AUTO: 55 %
NITRATE UR QL: NEGATIVE
NRBC # BLD AUTO: 0 10E3/UL
NRBC BLD AUTO-RTO: 0 /100
PH UR STRIP: 6 [PH] (ref 5–7)
PLATELET # BLD AUTO: 241 10E3/UL (ref 150–450)
POTASSIUM SERPL-SCNC: 3.8 MMOL/L (ref 3.4–5.3)
PROT SERPL-MCNC: 7.5 G/DL (ref 6.4–8.3)
RBC # BLD AUTO: 4.49 10E6/UL (ref 4.4–5.9)
RBC URINE: >182 /HPF
SODIUM SERPL-SCNC: 139 MMOL/L (ref 136–145)
SP GR UR STRIP: 1.02 (ref 1–1.03)
SPECIMEN DESCRIPTION: NORMAL
SPECIMEN EXPIRATION DATE: NORMAL
SPECIMEN EXPIRATION DATE: NORMAL
SQUAMOUS EPITHELIAL: 4 /HPF
T PALLIDUM AB SER QL: NONREACTIVE
TRANSITIONAL EPI: 2 /HPF
UROBILINOGEN UR STRIP-MCNC: 4 MG/DL
VZV IGG SER QL IA: 1917 INDEX
VZV IGG SER QL IA: POSITIVE
WBC # BLD AUTO: 8.7 10E3/UL (ref 4–11)
WBC CLUMPS #/AREA URNS HPF: PRESENT /HPF
WBC URINE: >182 /HPF

## 2023-06-22 PROCEDURE — 85730 THROMBOPLASTIN TIME PARTIAL: CPT | Mod: 91 | Performed by: PATHOLOGY

## 2023-06-22 PROCEDURE — 99207 PR NO CHARGE COORDINATED CARE PS: CPT

## 2023-06-22 PROCEDURE — 86704 HEP B CORE ANTIBODY TOTAL: CPT | Performed by: PHYSICIAN ASSISTANT

## 2023-06-22 PROCEDURE — 80053 COMPREHEN METABOLIC PANEL: CPT | Performed by: PATHOLOGY

## 2023-06-22 PROCEDURE — 86850 RBC ANTIBODY SCREEN: CPT | Performed by: PHYSICIAN ASSISTANT

## 2023-06-22 PROCEDURE — 99205 OFFICE O/P NEW HI 60 MIN: CPT | Performed by: SURGERY

## 2023-06-22 PROCEDURE — G0452 MOLECULAR PATHOLOGY INTERPR: HCPCS | Mod: 26 | Performed by: PATHOLOGY

## 2023-06-22 PROCEDURE — 85025 COMPLETE CBC W/AUTO DIFF WBC: CPT | Performed by: PATHOLOGY

## 2023-06-22 PROCEDURE — 86665 EPSTEIN-BARR CAPSID VCA: CPT | Performed by: PHYSICIAN ASSISTANT

## 2023-06-22 PROCEDURE — 86886 COOMBS TEST INDIRECT TITER: CPT | Performed by: PHYSICIAN ASSISTANT

## 2023-06-22 PROCEDURE — 86706 HEP B SURFACE ANTIBODY: CPT | Performed by: PHYSICIAN ASSISTANT

## 2023-06-22 PROCEDURE — 86644 CMV ANTIBODY: CPT | Performed by: PHYSICIAN ASSISTANT

## 2023-06-22 PROCEDURE — 36415 COLL VENOUS BLD VENIPUNCTURE: CPT | Performed by: PATHOLOGY

## 2023-06-22 PROCEDURE — 86832 HLA CLASS I HIGH DEFIN QUAL: CPT | Performed by: PHYSICIAN ASSISTANT

## 2023-06-22 PROCEDURE — 85390 FIBRINOLYSINS SCREEN I&R: CPT | Mod: 26 | Performed by: PATHOLOGY

## 2023-06-22 PROCEDURE — 86901 BLOOD TYPING SEROLOGIC RH(D): CPT | Performed by: PHYSICIAN ASSISTANT

## 2023-06-22 PROCEDURE — 85670 THROMBIN TIME PLASMA: CPT | Performed by: PHYSICIAN ASSISTANT

## 2023-06-22 PROCEDURE — 99000 SPECIMEN HANDLING OFFICE-LAB: CPT | Performed by: PATHOLOGY

## 2023-06-22 PROCEDURE — 99417 PROLNG OP E/M EACH 15 MIN: CPT

## 2023-06-22 PROCEDURE — 86147 CARDIOLIPIN ANTIBODY EA IG: CPT | Performed by: PHYSICIAN ASSISTANT

## 2023-06-22 PROCEDURE — 71046 X-RAY EXAM CHEST 2 VIEWS: CPT | Mod: GC | Performed by: RADIOLOGY

## 2023-06-22 PROCEDURE — 81240 F2 GENE: CPT | Performed by: PHYSICIAN ASSISTANT

## 2023-06-22 PROCEDURE — 86481 TB AG RESPONSE T-CELL SUSP: CPT | Performed by: PHYSICIAN ASSISTANT

## 2023-06-22 PROCEDURE — 86787 VARICELLA-ZOSTER ANTIBODY: CPT | Performed by: PHYSICIAN ASSISTANT

## 2023-06-22 PROCEDURE — 81001 URINALYSIS AUTO W/SCOPE: CPT | Performed by: PATHOLOGY

## 2023-06-22 PROCEDURE — 85610 PROTHROMBIN TIME: CPT | Performed by: PATHOLOGY

## 2023-06-22 PROCEDURE — 93000 ELECTROCARDIOGRAM COMPLETE: CPT | Performed by: INTERNAL MEDICINE

## 2023-06-22 PROCEDURE — 86833 HLA CLASS II HIGH DEFIN QUAL: CPT | Performed by: PHYSICIAN ASSISTANT

## 2023-06-22 PROCEDURE — 86803 HEPATITIS C AB TEST: CPT | Performed by: PHYSICIAN ASSISTANT

## 2023-06-22 PROCEDURE — 99213 OFFICE O/P EST LOW 20 MIN: CPT

## 2023-06-22 PROCEDURE — 87086 URINE CULTURE/COLONY COUNT: CPT | Performed by: PHYSICIAN ASSISTANT

## 2023-06-22 PROCEDURE — 85730 THROMBOPLASTIN TIME PARTIAL: CPT | Performed by: PHYSICIAN ASSISTANT

## 2023-06-22 PROCEDURE — 99205 OFFICE O/P NEW HI 60 MIN: CPT

## 2023-06-22 PROCEDURE — 86780 TREPONEMA PALLIDUM: CPT | Performed by: PHYSICIAN ASSISTANT

## 2023-06-22 PROCEDURE — 87340 HEPATITIS B SURFACE AG IA: CPT | Performed by: PHYSICIAN ASSISTANT

## 2023-06-22 RX ORDER — OMEPRAZOLE 40 MG/1
CAPSULE, DELAYED RELEASE ORAL
COMMUNITY
Start: 2023-05-13

## 2023-06-22 NOTE — LETTER
6/22/2023         RE: Ana Ch  [address]  [place] [address]        Dear Colleague,    Thank you for referring your patient, Ana Ch, to the [facility] TRANSPLANT [facility]. Please see a copy of my visit note below.    Transplant Surgery Consult Note     Medical record number: [id number]  YOB: 1990,   Consult requested by Dr Mcwilliams for evaluation of kidney transplant candidacy.    Assessment and Recommendations:Mr. Ch appears to be a fair candidate for kidney transplantation and has a good understanding of the risks and benefits of this approach to the management of renal failure. The following issues should be addressed prior to finalizing his transplant candidacy:     32 yo with FSGS on dialysis since Mar 23  PD cath scar   No transfusions  Obese BMI 39.2, pronounced abdominal obesity  Recommend weight loss to 225  Consult with Frida  Urologic issues include small bladder with thickening and scarring, and polyps  Unclear etiology  Was worked up in [facility]  Has live donors    Start with Urology and weight loss consults  COnsider bariatrics  Also ?candidate for bladder augmentation    Risks of the surgical procedure including but not limited to the rare risk of mortality discussed in detail. Patient verbalized good understanding and had several pertinent questions which were answered satisfactorily.     Immunosuppressive regimen, management and long term risks discussed in detail.   Transplant order: Mr. 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  Recommend he be considered as a candidate for kidney.  Cardiology consult for cardiac risk stratification to be ordered: No  CT abdomen and pelvis without contrast to be ordered for assessment of vascular targets: Yes  Transplant  listing labs ordered to include HLA, ABOx2, Cr, etc.  Dietician consult ordered: Yes  Social work consult ordered: Yes  Imaging reports reviewed:  yes  Radiology images reviewed:no  Recipient suitable to move forward with work up of living donors:  Yes      The majority of our visit was spent in counselling, discussing the medical and surgical risks of kidney transplantation. We discussed approximate wait time and how that is influenced by issues such as blood type and sensitization (PRA) and access to a living donor. I contrasted potential waiting time for living vs  donor kidneys from  normal (0-85%) or higher (%) kidney donor profile index (KDPI) donors and their associated outcomes. I would not recommend this individual to consider kidneys from high KDPI donors. The reason for this decision is best summarized as: multiple potential living donors. Potential surgical complications of kidney transplantation include bleeding, superficial or deep wound complications (infection, hernia, lymphocele), ureteral anastomotic failure (leak or stenosis), graft thrombosis, need for reoperation and other issues such as cardiac complications, pneumonia, deep venous thrombosis, pulmonary embolism, post transplant diabetes and death. The potential for recurrent disease or need for retransplantation was also addressed. We discussed the possible need for ureteral stent (and subsequent removal), and the utility of protocol biopsy and laboratory studies to evaluate for rejection or recurrent disease. We discussed the risk of graft rejection, our center's average graft and patient survival rates, immunosuppression protocols, as well as the potential opportunity to participate in clinical trials.  We also discussed the average length of stay, recovery process, and posttransplant lab and monitoring protocol.  I emphasized the need for strict immunosuppression medication adherence and the potential for complications of  immunosuppression such as skin cancer or lymphoma, as well as a very low but not zero risk of donor-derived disease transmission risks (infection, cancer). Mr. Ch asked good questions and his candidacy will be reviewed at our Multidisciplinary Selection Committee. Thank you for the opportunity to participate in Mr. Ch's care.      Total time: 60 minutes  Counselling time: 30 minutes    .  Wilber Benitez in Immunology and Transplantation  Surgical Director, Kidney & Pancreas Transplant Programs  Medical Director, Solid Organ Transplant Unit      ---------------------------------------------------------------------------------------------------    HPI: Mr. 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 The patient is non-diabetic.       The patient is on dialysis.    Has potential kidney donors:  Yes .  Interested in participation in paired exchange if a donor is willing: Yes     The patient has the following pertinent history:       No    Yes  Dialysis:    []      [] via:       Blood Transfusion                  []      []  Number of units:   Most recently:  Pregnancy:    []      [] Number:       Previous Transplant:  []      [] Details:    Cancer    []      [] Comment:   Kidney stones   []      [] Comment:      Recurrent infections  []      []  Type:                  Bladder dysfunction  []      [] Cause:    Claudication   []      [] Distance:    Previous Amputation  []      [] Cause:     Chronic anticoagulation  []      [] Indication:   [facility]  []      []      Past Medical History:   Diagnosis Date    Anxiety     Bladder tumor     Chronic kidney disease     Depression     Depressive disorder     Exposure to human immunodeficiency virus     Foot pain, bilateral     Gout     Hip pain     HTN (hypertension)     Hypertension     Controled with medication    Obesity     Prostatitis     Renal disease     Urinary retention     Urinary urgency       Past Surgical History:   Procedure Laterality Date    BIOPSY      CYSTOSCOPY, TRANSURETHRAL RESECTION (TUR) TUMOR BLADDER, COMBINED N/A 02/21/2023    Procedure: CYSTOSCOPY AND TRANSURETHRAL RESECTION OF BLADDER TUMORS;  Surgeon: Gaston Villarreal MD;  Location: [facility] OR    REPAIR PTOSIS Bilateral 2007    REPAIR PTOSIS Right 11/17/2021    Procedure: Right upper ptosis repair;  Surgeon: Hair Breen MD;  Location: [facility] OR    REPAIR PTOSIS Right 02/09/2022    Procedure: right upper eyelid ptosis repair;  Surgeon: Hair Breen MD;  Location: [facility] OR     Family History   Problem Relation Age of Onset    Glaucoma No family hx of     Macular Degeneration No family hx of      Social History     Socioeconomic History    Marital status: Single     Spouse name: Not on file    Number of children: Not on file    Years of education: Not on file    Highest education level: Not on file   Occupational History    Not on file   Tobacco Use    Smoking status: Never    Smokeless tobacco: Never   Substance and Sexual Activity    Alcohol use: Yes     Comment: 5/10/23-One drink per week    Drug use: Never    Sexual activity: Not on file   Other Topics Concern    Not on file   Social History Narrative    Not on file     Social Determinants of Health     Financial Resource Strain: Not on file   Food Insecurity: Not on file   Transportation Needs: Not on file   Physical Activity: Not on file   Stress: Not on file   Social Connections: Not on file   Intimate Partner Violence: Not on file   Housing Stability: Not on file       ROS:   CONSTITUTIONAL:  No fevers or chills  EYES: negative for icterus  ENT:  negative for hearing loss, tinnitus and sore throat  RESPIRATORY:  negative for cough, sputum, dyspnea  CARDIOVASCULAR:  negative for chest pain Fatigue  GASTROINTESTINAL:  negative for nausea, vomiting, diarrhea or constipation  GENITOURINARY:  negative for incontinence, dysuria, bladder emptying problems  HEME:  No easy  bruising  INTEGUMENT:  negative for rash and pruritus  NEURO:  Negative for headache, seizure disorder  Allergies:   Allergies   Allergen Reactions    Nifedipine      Needs to have osmotic extended release Procardia XL otherwise gets flushing     Medications:  Prescription Medications as of 2023         Rx Number Disp Refills Start End Last Dispensed Date Next Fill Date Owning Pharmacy    acetaminophen (TYLENOL) 325 MG tablet            Sig: Take 325-650 mg by mouth every 6 hours as needed    Class: Historical    Route: Oral    allopurinol (ZYLOPRIM) 100 MG tablet    2021        Sig: Take 50 mg by mouth daily    Class: Historical    Route: Oral    calcitRIOL (ROCALTROL) 0.25 MCG capsule            Sig: Take 0.5 mcg by mouth daily    Class: Historical    Route: Oral    Calcium Acetate 667 MG TABS            Sig: Take 1-2 tablets by mouth 3 times daily With meals    Class: Historical    Route: Oral    carvedilol (COREG) 6.25 MG tablet    2022    [id number] - [place], [place] - [address] AT [facility] [address]    Sig: Take 1 tablet (6.25 mg) by mouth 2 times daily (with meals)    Class: Historical    Route: Oral    Melatonin 10 MG TABS tablet            Sig: Take 10 mg by mouth nightly as needed for sleep    Class: Historical    Route: Oral    NIFEdipine ER OSMOTIC (PROCARDIA XL) 30 MG 24 hr tablet    2022    [facility] #[id number] - [place], [place] - [address] AT [facility] [address]    Sig: Take 3 tablets (90 mg) by mouth daily    Class: Historical    Notes to Pharmacy: **NEEDS TO TAKE THE OSMOTIC FORM**    Route: Oral    omeprazole (PRILOSEC) 40 MG DR capsule    2023        Si CAPSULES TWICE DAILY 30 MINUTES BEFORE BREAKFAST NAD DINNER FOR 4 WEEKS THEN DAILY FOR 2 WEEKS THEN STOP    Class: Historical    oxyCODONE (ROXICODONE) 5 MG tablet  6 tablet 0 2023    [facility] #[id number] - [place], [place] - [address] AT [facility]  [address]    Sig: Take 1-2 tablets (5-10 mg) by mouth every 4 hours as needed for moderate to severe pain    Class: E-Prescribe    Earliest Fill Date: 2/21/2023    Route: Oral    sertraline (ZOLOFT) 100 MG tablet            Sig: Take 100 mg by mouth daily    Class: Historical    Route: Oral    sodium bicarbonate 650 MG tablet    4/1/2022        Sig: Take 1,950 mg by mouth 3 times daily Take 3 tablets (1950 mg) by mouth every morning, every evening and every night at bedtime.    Class: Historical    Route: Oral    tamsulosin (FLOMAX) 0.4 MG capsule            Sig: Take 0.4 mg by mouth daily    Class: Historical    Route: Oral    vitamin D3 (CHOLECALCIFEROL) 50 mcg (2000 units) tablet            Sig: Take 1 tablet by mouth daily    Class: Historical    Route: Oral          Exam:     There were no vitals taken for this visit.  Appearance: in no apparent distress.   Skin: normal  Eyes:  no redness or discharge.  Sclera anicteric  Head and Neck: Normal, no rashes or jaundice  Respiratory: easy respirations, no audible wheezing.  Abdomen: obese, Surgical scars consistent with history     Psychiatric: Normal mood and affect    Diagnostics:   No results found for this or any previous visit (from the past 672 hour(s)).  No results found for: CPRA    Sincerely,        PKE

## 2023-06-22 NOTE — PROGRESS NOTES
Transplant Surgery Consult Note     Medical record number: [id number]  YOB: 1990,   Consult requested by Dr Mcwilliams for evaluation of kidney transplant candidacy.    Assessment and Recommendations:Mr. Ch appears to be a fair candidate for kidney transplantation and has a good understanding of the risks and benefits of this approach to the management of renal failure. The following issues should be addressed prior to finalizing his transplant candidacy:     34 yo with FSGS on dialysis since Mar 23  PD cath scar   No transfusions  Obese BMI 39.2, pronounced abdominal obesity  Recommend weight loss to 225  Consult with Frida  Urologic issues include small bladder with thickening and scarring, and polyps  Unclear etiology  Was worked up in [facility]  Has live donors    Start with Urology and weight loss consults  COnsider bariatrics  Also ?candidate for bladder augmentation    Risks of the surgical procedure including but not limited to the rare risk of mortality discussed in detail. Patient verbalized good understanding and had several pertinent questions which were answered satisfactorily.     Immunosuppressive regimen, management and long term risks discussed in detail.   Transplant order: Mr. 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  Recommend he be considered as a candidate for kidney.  Cardiology consult for cardiac risk stratification to be ordered: No  CT abdomen and pelvis without contrast to be ordered for assessment of vascular targets: Yes  Transplant listing labs ordered to include HLA, ABOx2, Cr, etc.  Dietician consult ordered: Yes  Social work consult ordered: Yes  Imaging reports reviewed:  yes  Radiology images reviewed:no  Recipient suitable to move forward with work up of living donors:  Yes      The  majority of our visit was spent in counselling, discussing the medical and surgical risks of kidney transplantation. We discussed approximate wait time and how that is influenced by issues such as blood type and sensitization (PRA) and access to a living donor. I contrasted potential waiting time for living vs  donor kidneys from  normal (0-85%) or higher (%) kidney donor profile index (KDPI) donors and their associated outcomes. I would not recommend this individual to consider kidneys from high KDPI donors. The reason for this decision is best summarized as: multiple potential living donors. Potential surgical complications of kidney transplantation include bleeding, superficial or deep wound complications (infection, hernia, lymphocele), ureteral anastomotic failure (leak or stenosis), graft thrombosis, need for reoperation and other issues such as cardiac complications, pneumonia, deep venous thrombosis, pulmonary embolism, post transplant diabetes and death. The potential for recurrent disease or need for retransplantation was also addressed. We discussed the possible need for ureteral stent (and subsequent removal), and the utility of protocol biopsy and laboratory studies to evaluate for rejection or recurrent disease. We discussed the risk of graft rejection, our center's average graft and patient survival rates, immunosuppression protocols, as well as the potential opportunity to participate in clinical trials.  We also discussed the average length of stay, recovery process, and posttransplant lab and monitoring protocol.  I emphasized the need for strict immunosuppression medication adherence and the potential for complications of immunosuppression such as skin cancer or lymphoma, as well as a very low but not zero risk of donor-derived disease transmission risks (infection, cancer). Mr. Ch asked good questions and his candidacy will be reviewed at our Multidisciplinary Selection  Committee. Thank you for the opportunity to participate in Mr. Ch's care.      Total time: 60 minutes  Counselling time: 30 minutes    .  Wilber Benitez in Immunology and Transplantation  Surgical Director, Kidney & Pancreas Transplant Programs  Medical Director, Solid Organ Transplant Unit      ---------------------------------------------------------------------------------------------------    HPI: Mr. 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 The patient is non-diabetic.       The patient is on dialysis.    Has potential kidney donors:  Yes .  Interested in participation in paired exchange if a donor is willing: Yes     The patient has the following pertinent history:       No    Yes  Dialysis:    []      [] via:       Blood Transfusion                  []      []  Number of units:   Most recently:  Pregnancy:    []      [] Number:       Previous Transplant:  []      [] Details:    Cancer    []      [] Comment:   Kidney stones   []      [] Comment:      Recurrent infections  []      []  Type:                  Bladder dysfunction  []      [] Cause:    Claudication   []      [] Distance:    Previous Amputation  []      [] Cause:     Chronic anticoagulation  []      [] Indication:   [facility]  []      []      Past Medical History:   Diagnosis Date     Anxiety      Bladder tumor      Chronic kidney disease      Depression      Depressive disorder      Exposure to human immunodeficiency virus      Foot pain, bilateral      Gout      Hip pain      HTN (hypertension)      Hypertension     Controled with medication     Obesity      Prostatitis      Renal disease      Urinary retention      Urinary urgency      Past Surgical History:   Procedure Laterality Date     BIOPSY       CYSTOSCOPY, TRANSURETHRAL RESECTION (TUR) TUMOR BLADDER, COMBINED N/A 02/21/2023    Procedure: CYSTOSCOPY AND TRANSURETHRAL RESECTION OF BLADDER TUMORS;  Surgeon:  Gaston Villarreal MD;  Location: [facility] OR     REPAIR PTOSIS Bilateral 2007     REPAIR PTOSIS Right 11/17/2021    Procedure: Right upper ptosis repair;  Surgeon: Hair Breen MD;  Location: [facility] [place]     REPAIR PTOSIS Right 02/09/2022    Procedure: right upper eyelid ptosis repair;  Surgeon: Hair Breen MD;  Location: [facility]     Family History   Problem Relation Age of Onset     Glaucoma No family hx of      Macular Degeneration No family hx of      Social History     Socioeconomic History     Marital status: Single     Spouse name: Not on file     Number of children: Not on file     Years of education: Not on file     Highest education level: Not on file   Occupational History     Not on file   Tobacco Use     Smoking status: Never     Smokeless tobacco: Never   Substance and Sexual Activity     Alcohol use: Yes     Comment: 5/10/23-One drink per week     Drug use: Never     Sexual activity: Not on file   Other Topics Concern     Not on file   Social History Narrative     Not on file     Social Determinants of Health     Financial Resource Strain: Not on file   Food Insecurity: Not on file   Transportation Needs: Not on file   Physical Activity: Not on file   Stress: Not on file   Social Connections: Not on file   Intimate Partner Violence: Not on file   Housing Stability: Not on file       ROS:   CONSTITUTIONAL:  No fevers or chills  EYES: negative for icterus  ENT:  negative for hearing loss, tinnitus and sore throat  RESPIRATORY:  negative for cough, sputum, dyspnea  CARDIOVASCULAR:  negative for chest pain Fatigue  GASTROINTESTINAL:  negative for nausea, vomiting, diarrhea or constipation  GENITOURINARY:  negative for incontinence, dysuria, bladder emptying problems  HEME:  No easy bruising  INTEGUMENT:  negative for rash and pruritus  NEURO:  Negative for headache, seizure disorder  Allergies:   Allergies   Allergen Reactions     Nifedipine      Needs to have osmotic extended release Procardia  XL otherwise gets flushing     Medications:  Prescription Medications as of 2023       Rx Number Disp Refills Start End Last Dispensed Date Next Fill Date Owning Pharmacy    acetaminophen (TYLENOL) 325 MG tablet            Sig: Take 325-650 mg by mouth every 6 hours as needed    Class: Historical    Route: Oral    allopurinol (ZYLOPRIM) 100 MG tablet    2021        Sig: Take 50 mg by mouth daily    Class: Historical    Route: Oral    calcitRIOL (ROCALTROL) 0.25 MCG capsule            Sig: Take 0.5 mcg by mouth daily    Class: Historical    Route: Oral    Calcium Acetate 667 MG TABS            Sig: Take 1-2 tablets by mouth 3 times daily With meals    Class: Historical    Route: Oral    carvedilol (COREG) 6.25 MG tablet    2022    [id number] - [place], [place] - [address] AT [facility] [address]    Sig: Take 1 tablet (6.25 mg) by mouth 2 times daily (with meals)    Class: Historical    Route: Oral    Melatonin 10 MG TABS tablet            Sig: Take 10 mg by mouth nightly as needed for sleep    Class: Historical    Route: Oral    NIFEdipine ER OSMOTIC (PROCARDIA XL) 30 MG 24 hr tablet    2022    [facility] #[id number] - [place], [place] - [address] AT [facility] [address]    Sig: Take 3 tablets (90 mg) by mouth daily    Class: Historical    Notes to Pharmacy: **NEEDS TO TAKE THE OSMOTIC FORM**    Route: Oral    omeprazole (PRILOSEC) 40 MG DR capsule    2023        Si CAPSULES TWICE DAILY 30 MINUTES BEFORE BREAKFAST NAD DINNER FOR 4 WEEKS THEN DAILY FOR 2 WEEKS THEN STOP    Class: Historical    oxyCODONE (ROXICODONE) 5 MG tablet  6 tablet 0 2023    [facility] #[id number] - [place], [place] - [address] AT [facility] [address]    Sig: Take 1-2 tablets (5-10 mg) by mouth every 4 hours as needed for moderate to severe pain    Class: E-Prescribe    Earliest Fill Date: 2023    Route: Oral    sertraline (ZOLOFT) 100 MG tablet             Sig: Take 100 mg by mouth daily    Class: Historical    Route: Oral    sodium bicarbonate 650 MG tablet    4/1/2022        Sig: Take 1,950 mg by mouth 3 times daily Take 3 tablets (1950 mg) by mouth every morning, every evening and every night at bedtime.    Class: Historical    Route: Oral    tamsulosin (FLOMAX) 0.4 MG capsule            Sig: Take 0.4 mg by mouth daily    Class: Historical    Route: Oral    vitamin D3 (CHOLECALCIFEROL) 50 mcg (2000 units) tablet            Sig: Take 1 tablet by mouth daily    Class: Historical    Route: Oral        Exam:     There were no vitals taken for this visit.  Appearance: in no apparent distress.   Skin: normal  Eyes:  no redness or discharge.  Sclera anicteric  Head and Neck: Normal, no rashes or jaundice  Respiratory: easy respirations, no audible wheezing.  Abdomen: obese, Surgical scars consistent with history     Psychiatric: Normal mood and affect    Diagnostics:   No results found for this or any previous visit (from the past 672 hour(s)).  No results found for: CPRA

## 2023-06-22 NOTE — PROGRESS NOTES
Psychosocial Assessment For Kidney  Transplantation  Patient Name/ Age: Ana Ch 33 year old   Medical Record #: [id number]  Duration of Interview:     30 min  Process:   Face-to-Face Interview                (counseling < 50%)   Present at Appointment: Ana and mother Dian         : ZORA Miranda Date:  June 22, 2023        Type of transplant: Kidney     Donor type:      relative   Prior Transplants:    No Status of Transplant:           Current Living Situation    Location:   [address]  [place] [address]  With Whom: lives alone       Family/ Social Support:    Ana resides alone in [place], [place]. He is single, no children. His father has passed away. He is close with his mother Dian. Ana has one sibling, sister jaime whom lives in [place]. He will occasionally speak with his Uncle Dany. He reports that he has limited communication with his other extended family members.     Mother will be post transplant support.  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 He sees a psychotherapist and is on a medication for this. He is self aware of his symptoms and stable with his treatment plan.    Reading Ability: Good  Education Level: Some  College Recent Legal History No      Coping Style/Strategies: knitting, cat, walking        Ability to Adhere to Complex Medical Regime: Yes     Adherence History:        Education  _X_ Medicare  _X_ Rehabilitation  _X_ Donor issues  _X_ Community resources  _X_ Post discharge housing  _X_ Financial resources  _X_ Medical insurance options  _X_ Psych adjustment  _X_ Family adjustment  _X_ Health Care Directive Provided Education   Psychosocial Risks of Transplant Reviewed and Discussed:  _X_ Increased stress related to emotional,            family, social, employment or financial           situation  _X_ Effect on work and/or disability benefits  _X_ Effect on future health and life           insurance  _X_ Transplant outcome expectations may           not be met  _X_ Mental Health Risks: anxiety,           depression, PTSD, guilt, grief and           chronic fatigue     Notable Items:   None noted.       Final Evaluation/Assessment   Patient seemed to process information well. Appeared well informed, motivated and able to follow post transplant requirements. Behavior was appropriate during interview. Has adequate income and insurance coverage. Adequate social support. No major contraindications noted for transplant.  At this time patient appears to understand the risks and benefits of transplant.      Recommendation  Acceptable    Selection Criteria Met:  Plan for support Yes   Chemical Dependence Yes   Smoking Yes   Mental Health Yes   Adequate Finances Yes    Signature: ZORA MirandaSW   Title: Clinical

## 2023-06-22 NOTE — PROGRESS NOTES
TRANSPLANT NEPHROLOGY RECIPIENT EVALUATION NOTE    Assessment and Plan:  # Kidney Transplant Evaluation: Patient appears to be a good candidate overall, but urologic issues need to be sorted out first. Benefits of a living donor transplant were discussed.    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 He continued to have a slow progression of his kidney function and eventually started dialysis March 2023, which has been doing OK, but may benefit from a kidney transplant.    # Complex Urologic Issues: with cysto/TURBT April 2023 showing diffuse cystitis, very thickened bladder wall, small bladder capacity, high pressures upon minimal filling, right-sided distal ureteral stenosis with severe proximal hydroureteronephrosis, and abnormal appearance of the right half of the bladder. No evidence of atypia or malignancy. Etiology not known at this time. Will refer to urology here.     # Cardiac Risk: no known history of cardiac disease or events and he is asymptomatic with exertion. Will get EKG and ECHO    # Obesity: BMI 39. Will refer to weight management.     # Gastric Lesion: noted on CT but not identified on May 2023 EGD. EUS recommended and patient will schedule.    # Health Maintenance: Dental: Up to date    Recommendations:  - Obtain full native kidney biopsy report   - Weight management referral  - Urology referral   - Complete EUS as previously planned  - EKG and ECHO  - Repeat calcium in a few weeks     Discussed the risks and benefits of a transplant, including the risk of surgery and immunosuppression medications.  Patient's overall evaluation will be discussed in the Transplant Program's regular meeting with a final recommendation on the patients suitability for transplant to be made at that time.    Evaluation:  Ana Ch was seen in consultation at the request of Dr. Austin Centeno for evaluation as a potential kidney transplant  recipient.    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    History of Present Illness:         Kidney Disease Hx:        Started following with nephrology in early 2020 for elevated creatinine in the 2's mg/dl and nephrotic-range proteinuria (without nephrotic syndrome), but reports previously being told his creatinine was elevated several years prior. History of chronic NSAID (ibuprofen) use for ? (600 mg most days for 10 years, since discontinued) and significant gout (ibuprofen and colchicine use previously). Renal US with increased echogenicity. July 2020 native kidney biopsy with prelim report showing secondary FSGS thought likely 2/2 obesity. Also some mention regarding concern for elevated creatinine in the setting of tenofovir for PREP a few years ago. Had progressive decline and started dialysis early 2023. Previously listed at [facility], was on hold due to high BMI, but then recently removed due to complex urologic issues (see above, but found to have bladder wall thickening on CT, which led to cysto/TURBT and abnormal bladder findings). Had PD catheter complications recently and so switched to HD. Still has urine output but very little. ERNST GAONA working ok       Primary Nephrologist: Dr. Mcwilliams       H/o Kidney Stones: No       H/o Recurrent/Frequent UTI: No         Diabetic Hx: None           Cardiac/Vascular Disease Risk Factors:        Cardiac Risk Factors: Hypertension and CKD       Known CAD: No       Known PAD/Caludication Symptoms: No       Known Heart Failure: No       Arrhythmia: No       Pulmonary Hypertension: No       Valvular Disease: No       Other: None         Viral Serology Status       CMV IgG Antibody: Unknown       EBV IgG Antibody: Unknown         Volume Status/Weight:        Volume status: Euvolemic       BMI: Body mass index is 39.23  kg/m .         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 Denies physical limitations.     Fatigue/Decreased Energy: [x] No [] Yes    Chest Pain or SOB with Exertion: [x] No [] Yes    Significant Weight Change: [x] No [] Yes    Nausea, Vomiting or Diarrhea: [x] No [] Yes    Fever, Sweats or Chills:  [x] No [] Yes    Leg Swelling [x] No [] Yes      Allergy Testing Questions:   Medication that caused a reaction Other drugs:  nifedipine   Antibiotics used that didn't give an allergic reaction?  None    COVID Vaccination Up To Date: Not asked    Potential Living Kidney Donors: Yes    Review of Systems:  A comprehensive review of systems was obtained and negative, except as noted in the HPI or PMH.    Past Medical History:   Medical record was reviewed and PMH was discussed with patient and noted below.  Past Medical History:   Diagnosis Date     Anxiety      Bladder tumor      Chronic kidney disease      Depression      Depressive disorder      Exposure to human immunodeficiency virus      Foot pain, bilateral      Gout      Hip pain      HTN (hypertension)      Hypertension     Controled with medication     Obesity      Prostatitis      Renal disease      Urinary retention      Urinary urgency        Past Social History:   Past Surgical History:   Procedure Laterality Date     BIOPSY       CYSTOSCOPY, TRANSURETHRAL RESECTION (TUR) TUMOR BLADDER, COMBINED N/A 02/21/2023    Procedure: CYSTOSCOPY AND TRANSURETHRAL RESECTION OF BLADDER TUMORS;  Surgeon: Gaston Villarreal MD;  Location: [facility] OR     REPAIR PTOSIS Bilateral 2007     REPAIR PTOSIS Right 11/17/2021    Procedure: Right upper ptosis repair;  Surgeon: Hair Breen MD;  Location: [facility] OR     REPAIR PTOSIS Right 02/09/2022    Procedure: right upper eyelid ptosis repair;  Surgeon: Hair Breen MD;  Location: [facility] OR     Personal  history of bleeding or anesthesia problems: No    Family History:  Family History   Problem Relation Age of Onset     Glaucoma No family hx of      Macular Degeneration No family hx of      Kidney Disease No family hx of        Personal History:   Social History     Socioeconomic History     Marital status: Single     Spouse name: Not on file     Number of children: Not on file     Years of education: Not on file     Highest education level: Not on file   Occupational History     Not on file   Tobacco Use     Smoking status: Never     Smokeless tobacco: Never   Substance and Sexual Activity     Alcohol use: Yes     Comment: 5/10/23-One drink per week     Drug use: Never     Sexual activity: Not on file   Other Topics Concern     Not on file   Social History Narrative     Not on file     Social Determinants of Health     Financial Resource Strain: Not on file   Food Insecurity: Not on file   Transportation Needs: Not on file   Physical Activity: Not on file   Stress: Not on file   Social Connections: Not on file   Intimate Partner Violence: Not on file   Housing Stability: Not on file       Allergies:  Allergies   Allergen Reactions     Nifedipine      Needs to have osmotic extended release Procardia XL otherwise gets flushing       Medications:  Current Outpatient Medications   Medication Sig     acetaminophen (TYLENOL) 325 MG tablet Take 325-650 mg by mouth every 6 hours as needed     allopurinol (ZYLOPRIM) 100 MG tablet Take 50 mg by mouth daily     carvedilol (COREG) 6.25 MG tablet Take 1 tablet (6.25 mg) by mouth 2 times daily (with meals)     Melatonin 10 MG TABS tablet Take 10 mg by mouth nightly as needed for sleep     NIFEdipine ER OSMOTIC (PROCARDIA XL) 30 MG 24 hr tablet Take 3 tablets (90 mg) by mouth daily     omeprazole (PRILOSEC) 40 MG DR capsule 1 CAPSULES TWICE DAILY 30 MINUTES BEFORE BREAKFAST NAD DINNER FOR 4 WEEKS THEN DAILY FOR 2 WEEKS THEN STOP     sertraline (ZOLOFT) 100 MG tablet Take 100 mg  "by mouth daily     vitamin D3 (CHOLECALCIFEROL) 50 mcg (2000 units) tablet Take 1 tablet by mouth daily     calcitRIOL (ROCALTROL) 0.25 MCG capsule Take 0.5 mcg by mouth daily     Calcium Acetate 667 MG TABS Take 1-2 tablets by mouth 3 times daily With meals     oxyCODONE (ROXICODONE) 5 MG tablet Take 1-2 tablets (5-10 mg) by mouth every 4 hours as needed for moderate to severe pain (Patient not taking: Reported on 6/22/2023)     sodium bicarbonate 650 MG tablet Take 1,950 mg by mouth 3 times daily Take 3 tablets (1950 mg) by mouth every morning, every evening and every night at bedtime.     tamsulosin (FLOMAX) 0.4 MG capsule Take 0.4 mg by mouth daily (Patient not taking: Reported on 6/22/2023)     No current facility-administered medications for this visit.       Vitals:  /68   Pulse 82   Ht 1.727 m (5' 8\")   Wt 117 kg (258 lb)   BMI 39.23 kg/m      Exam:  GENERAL APPEARANCE: alert and no distress  HENT: mouth without ulcers or lesions, good dentition   LYMPHATICS: no cervical or supraclavicular nodes  RESP: lungs clear to auscultation - no rales, rhonchi or wheezes  CV: regular rhythm, normal rate, no rub, no murmur  EDEMA: no LE edema bilaterally  ABDOMEN: soft, nondistended, nontender, central obesity  MS: extremities normal - no gross deformities noted, no evidence of inflammation in joints, no muscle tenderness  SKIN: no rash    Results:   No results found for this or any previous visit (from the past 336 hour(s)).     Review of prior external note(s) from - [facility]where information from [facility] and [facility] reviewed  I spent a total of 128 minutes on the day of the visit.   Time spent by me doing chart review, history and exam, documentation and further activities per the note          "

## 2023-06-22 NOTE — PROGRESS NOTES
Kidney Transplant Referral - 5/5/2023  Patient attended appointments accompanied by mother  Patient completed AM appointments with all [facility] providers.  Time and location of PM appointments reviewed with patient.  Patient instructed next contact from Transplant Coordinator will be following Selection Committee  Patient stated understanding  Patient states Receipt of Information for Organ Transplant Recipient form signed via [employer] form signed and faxed to HIM  Patient has not watched [facility] Pre Kidney Transplant videos 1&2.  Demonstrated accessing [facility] and watching Pre Kidney Transplant videos 1&2.        Summary    Team s concerns/comments:   1) Cardiac risk assessment  2) PAD assessment  3) Urological assessment  4) BMI/Central obesity  5) Gastric lesion  6) Health maintenance    Candidacy category: Yellow    Action/Plan:   Request native kidney biopsy    1) EKG today. Echocardiogram scheduled 6/29/23-patient aware.  2) A/P CT  3) Urology consult   4) Weight management  5) EUS-patient to schedule  6) UTD    Expected Selection Meeting Discussion: 6/28/23

## 2023-06-22 NOTE — PROGRESS NOTES
"[facility]  Outpatient MNT: Kidney Transplant Evaluation    Current BMI: 39.23 (HT 68 in,  lbs/117 kg)  BMI guideline for kidney transplant up to a BMI of 40 / per surgeon discretion     Frailty Assessment -- Not Frail (0/5 points)      Recommended Interventions to Address Frailty:  None     Time Spent: 30 minutes  Visit Type: Initial   Referring Physician: Dr. Melendez  Pt accompanied by: Mother     History of previous txp: No  Dialysis: Yes    Dialysis Info: HD 3 days/week   Protein supplement: No    Nutrition Assessment  Ana inquired about elevated phos. Discussed importance of Phos binder (recently changed phos binder type per Ana's report, and reports taking with every meal). Ana listed off the food/food groups he avoids (dairy, chocolate, peanuts, avoiding foods with \"phos\" in any of the foods on the ingredient list) and all adequate foods to limit/avoid due to higher phosphorus content.    - Appetite: Good - typically eats 3-4 meals/day.   - Food allergies/intolerances: No  - Meal prep & grocery shopping: Ana   - Previous RD education: Yes, at dialysis   - Issues chewing or swallowing: No  - N/V/D/C: No  - Food access concerns: No    Vitamins, Supplements, Pertinent Meds: Calcium Acetate Ana discontinued (replaced with Lanathum carbonate 2 with every meal), Zoloft, Sodium Bicarbonate (reports discontinued), Calcitrol (reports discontinued) Vitamin D3.   Herbal Medicines/Supplements: No    Edema: No.     Weight hx:   -Ana reports intentional weight loss, was previously told <260 lbs for transplant criteria as a different facility so that has been his goal, but is not opposed to further weight loss. Reports changing snacks, mostly using baby carrots instead of other snacks.   -Pt with 32 lb (11 %) weight loss over ~11 months.    Wt Readings from Last Encounters:   06/22/23 117 kg (258 lbs)    05/10/23 112.3 kg (247 lb 8 oz)   03/25/23 120.2 kg (265 lb) "   02/21/23 127.9 kg (281 lb 14.4 oz)   11/19/22 129.3 kg (285 lb)   07/21/22 131.5 kg (290 lb)   03/03/22 129.3 kg (285 lb)   02/09/22 129.3 kg (285 lb)   02/07/22 129.5 kg (285 lb 6.4 oz)     Diet Recall  Breakfast Coffee - no sugar, sometimes cream  Eggs, sometimes meat, no    Lunch Sandwiches   Dinner Protein   Salad kit    Snacks Crackers   Cereal + almond milk   Beverages Water  Tea   Carbonated erickson  Coffee    Alcohol <1x/week   Dining out 1x/week   Fast food      Physical Activity  Has been working with PT on stretches recently. Also completes household chores and does daily walks.     Labs  Per Care Everywhere - 06/19/23  Creatinine - 9.5 (H)  Potassium - 4.2   Phosphorus - 7.2 (H) - Just started new phos binder. Pt reports high (>6) since December.     Nutrition Diagnosis  No nutrition diagnosis identified at this time     Nutrition Intervention  Nutrition education provided:  Discussed sodium intake (low sodium foods and drinks, seasoning food without salt and tips for low sodium diet).    Reviewed post txp diet guidelines in brief (will review in further detail post txp):  (1) Review of proper food safety measures d/t immunosuppressant therapy post-op and increased risk for food-borne illness    (2) Avoid the following post txp d/t risk for rejection, unknown effects on the organs, and/or potential interactions with immunosuppressants:  - Herbal, Chinese, holistic, chiropractic, natural, alternative medicines and supplements  - Detoxes and cleanses  - Weight loss pills  - Protein powders or other products with extracts or herbs (ie green tea extract)    (3) Med regimen and possible side effects    Patient Understanding: Pt verbalized understanding of education provided.  Expected Engagement: Good   Follow-Up Plans: PRN     Nutrition Goals  No nutrition goals identified at this time     Dionna Romero RD, LD

## 2023-06-22 NOTE — LETTER
6/22/2023         RE: Ana Ch  [address]  [place] [address]        Dear Colleague,    Thank you for referring your patient, Ana Ch, to the [facility] TRANSPLANT [facility]INIC. Please see a copy of my visit note below.    TRANSPLANT NEPHROLOGY RECIPIENT EVALUATION NOTE    Assessment and Plan:  # Kidney Transplant Evaluation: Patient appears to be a good candidate overall, but urologic issues need to be sorted out first. Benefits of a living donor transplant were discussed.    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 He continued to have a slow progression of his kidney function and eventually started dialysis March 2023, which has been doing OK, but may benefit from a kidney transplant.    # Complex Urologic Issues: with cysto/TURBT April 2023 showing diffuse cystitis, very thickened bladder wall, small bladder capacity, high pressures upon minimal filling, right-sided distal ureteral stenosis with severe proximal hydroureteronephrosis, and abnormal appearance of the right half of the bladder. No evidence of atypia or malignancy. Etiology not known at this time. Will refer to urology here.     # Cardiac Risk: no known history of cardiac disease or events and he is asymptomatic with exertion. Will get EKG and ECHO    # Obesity: BMI 39. Will refer to weight management.     # Gastric Lesion: noted on CT but not identified on May 2023 EGD. EUS recommended and patient will schedule.    # Health Maintenance: Dental: Up to date    Recommendations:  - Obtain full native kidney biopsy report   - Weight management referral  - Urology referral   - Complete EUS as previously planned  - EKG and ECHO    Discussed the risks and benefits of a transplant, including the risk of surgery and immunosuppression medications.  Patient's overall evaluation will be discussed in the Transplant Program's regular meeting with a final  recommendation on the patients suitability for transplant to be made at that time.    Evaluation:  Ana Ch was seen in consultation at the request of Dr. Austin Centeno for evaluation as a potential kidney transplant recipient.    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    History of Present Illness:         Kidney Disease Hx:        Started following with nephrology in early 2020 for elevated creatinine in the 2's mg/dl and nephrotic-range proteinuria (without nephrotic syndrome), but reports previously being told his creatinine was elevated several years prior. History of chronic NSAID (ibuprofen) use for ? (600 mg most days for 10 years, since discontinued) and significant gout (ibuprofen and colchicine use previously). Renal US with increased echogenicity. July 2020 native kidney biopsy with prelim report showing secondary FSGS thought likely 2/2 obesity. Also some mention regarding concern for elevated creatinine in the setting of tenofovir for PREP a few years ago. Had progressive decline and started dialysis early 2023. Previously listed at [facility], was on hold due to high BMI, but then recently removed due to complex urologic issues (see above, but found to have bladder wall thickening on CT, which led to cysto/TURBT and abnormal bladder findings). Had PD catheter complications recently and so switched to HD. Still has urine output but very little. ERNST GAONA working ok       Primary Nephrologist: Dr. Mcwilliams       H/o Kidney Stones: No       H/o Recurrent/Frequent UTI: No         Diabetic Hx: None           Cardiac/Vascular Disease Risk Factors:        Cardiac Risk Factors: Hypertension and CKD       Known CAD: No       Known PAD/Caludication Symptoms: No       Known Heart Failure: No       Arrhythmia: No       Pulmonary Hypertension: No       Valvular  Disease: No       Other: None         Viral Serology Status       CMV IgG Antibody: Unknown       EBV IgG Antibody: Unknown         Volume Status/Weight:        Volume status: Euvolemic       BMI: Body mass index is 39.23 kg/m .         Functional Capacity/Frailty:        He works for [employer], mostly at home, sitting, can walk for at least an hour, stairs are ok, no chest pain or SOB. Denies physical limitations.     Fatigue/Decreased Energy: [x] No [] Yes    Chest Pain or SOB with Exertion: [x] No [] Yes    Significant Weight Change: [x] No [] Yes    Nausea, Vomiting or Diarrhea: [x] No [] Yes    Fever, Sweats or Chills:  [x] No [] Yes    Leg Swelling [x] No [] Yes      Allergy Testing Questions:   Medication that caused a reaction Other drugs:  nifedipine   Antibiotics used that didn't give an allergic reaction?  None    COVID Vaccination Up To Date: Not asked    Potential Living Kidney Donors: Yes    Review of Systems:  A comprehensive review of systems was obtained and negative, except as noted in the HPI or PMH.    Past Medical History:   Medical record was reviewed and PMH was discussed with patient and noted below.  Past Medical History:   Diagnosis Date    Anxiety     Bladder tumor     Chronic kidney disease     Depression     Depressive disorder     Exposure to human immunodeficiency virus     Foot pain, bilateral     Gout     Hip pain     HTN (hypertension)     Hypertension     Controled with medication    Obesity     Prostatitis     Renal disease     Urinary retention     Urinary urgency        Past Social History:   Past Surgical History:   Procedure Laterality Date    BIOPSY      CYSTOSCOPY, TRANSURETHRAL RESECTION (TUR) TUMOR BLADDER, COMBINED N/A 02/21/2023    Procedure: CYSTOSCOPY AND TRANSURETHRAL RESECTION OF BLADDER TUMORS;  Surgeon: Gaston Villarreal MD;  Location: [facility] [place]    REPAIR PTOSIS Bilateral 2007    REPAIR PTOSIS Right 11/17/2021    Procedure: Right upper ptosis repair;   Surgeon: Hair Breen MD;  Location: [facility] [place]    REPAIR PTOSIS Right 02/09/2022    Procedure: right upper eyelid ptosis repair;  Surgeon: Hair Breen MD;  Location: [facility] [place]     Personal history of bleeding or anesthesia problems: No    Family History:  Family History   Problem Relation Age of Onset    Glaucoma No family hx of     Macular Degeneration No family hx of     Kidney Disease No family hx of        Personal History:   Social History     Socioeconomic History    Marital status: Single     Spouse name: Not on file    Number of children: Not on file    Years of education: Not on file    Highest education level: Not on file   Occupational History    Not on file   Tobacco Use    Smoking status: Never    Smokeless tobacco: Never   Substance and Sexual Activity    Alcohol use: Yes     Comment: 5/10/23-One drink per week    Drug use: Never    Sexual activity: Not on file   Other Topics Concern    Not on file   Social History Narrative    Not on file     Social Determinants of Health     Financial Resource Strain: Not on file   Food Insecurity: Not on file   Transportation Needs: Not on file   Physical Activity: Not on file   Stress: Not on file   Social Connections: Not on file   Intimate Partner Violence: Not on file   Housing Stability: Not on file       Allergies:  Allergies   Allergen Reactions    Nifedipine      Needs to have osmotic extended release Procardia XL otherwise gets flushing       Medications:  Current Outpatient Medications   Medication Sig    acetaminophen (TYLENOL) 325 MG tablet Take 325-650 mg by mouth every 6 hours as needed    allopurinol (ZYLOPRIM) 100 MG tablet Take 50 mg by mouth daily    carvedilol (COREG) 6.25 MG tablet Take 1 tablet (6.25 mg) by mouth 2 times daily (with meals)    Melatonin 10 MG TABS tablet Take 10 mg by mouth nightly as needed for sleep    NIFEdipine ER OSMOTIC (PROCARDIA XL) 30 MG 24 hr tablet Take 3 tablets (90 mg) by mouth daily    omeprazole (PRILOSEC)  "40 MG DR capsule 1 CAPSULES TWICE DAILY 30 MINUTES BEFORE BREAKFAST NAD DINNER FOR 4 WEEKS THEN DAILY FOR 2 WEEKS THEN STOP    sertraline (ZOLOFT) 100 MG tablet Take 100 mg by mouth daily    vitamin D3 (CHOLECALCIFEROL) 50 mcg (2000 units) tablet Take 1 tablet by mouth daily    calcitRIOL (ROCALTROL) 0.25 MCG capsule Take 0.5 mcg by mouth daily    Calcium Acetate 667 MG TABS Take 1-2 tablets by mouth 3 times daily With meals    oxyCODONE (ROXICODONE) 5 MG tablet Take 1-2 tablets (5-10 mg) by mouth every 4 hours as needed for moderate to severe pain (Patient not taking: Reported on 6/22/2023)    sodium bicarbonate 650 MG tablet Take 1,950 mg by mouth 3 times daily Take 3 tablets (1950 mg) by mouth every morning, every evening and every night at bedtime.    tamsulosin (FLOMAX) 0.4 MG capsule Take 0.4 mg by mouth daily (Patient not taking: Reported on 6/22/2023)     No current facility-administered medications for this visit.       Vitals:  /68   Pulse 82   Ht 1.727 m (5' 8\")   Wt 117 kg (258 lb)   BMI 39.23 kg/m      Exam:  GENERAL APPEARANCE: alert and no distress  HENT: mouth without ulcers or lesions, good dentition   LYMPHATICS: no cervical or supraclavicular nodes  RESP: lungs clear to auscultation - no rales, rhonchi or wheezes  CV: regular rhythm, normal rate, no rub, no murmur  EDEMA: no LE edema bilaterally  ABDOMEN: soft, nondistended, nontender, central obesity  MS: extremities normal - no gross deformities noted, no evidence of inflammation in joints, no muscle tenderness  SKIN: no rash    Results:   No results found for this or any previous visit (from the past 336 hour(s)).     Review of prior external note(s) from - CareEverywhere information from [facility] and [facility] reviewed  I spent a total of 128 minutes on the day of the visit.   Time spent by me doing chart review, history and exam, documentation and further activities per the note          Again, thank you for allowing me to " participate in the care of your patient.        Sincerely,        AURA

## 2023-06-23 LAB
ATRIAL RATE - MUSE: 81 BPM
BACTERIA UR CULT: NORMAL
CARDIOLIPIN IGG SER IA-ACNC: 4.3 GPL-U/ML
CARDIOLIPIN IGG SER IA-ACNC: NEGATIVE
CARDIOLIPIN IGM SER IA-ACNC: 7.5 MPL-U/ML
CARDIOLIPIN IGM SER IA-ACNC: NEGATIVE
DIASTOLIC BLOOD PRESSURE - MUSE: NORMAL MMHG
DRVVT SCREEN RATIO: 0.95
HBV SURFACE AB SERPL IA-ACNC: 7.32 M[IU]/ML
HBV SURFACE AB SERPL IA-ACNC: NONREACTIVE M[IU]/ML
INTERPRETATION ECG - MUSE: NORMAL
LA PPP-IMP: NEGATIVE
LUPUS INTERPRETATION: NORMAL
P AXIS - MUSE: 57 DEGREES
PR INTERVAL - MUSE: 154 MS
PROTOCOL CUTOFF: NORMAL
PTT RATIO: 1.05
QRS DURATION - MUSE: 98 MS
QT - MUSE: 382 MS
QTC - MUSE: 443 MS
QUANTIFERON MITOGEN: 10 IU/ML
QUANTIFERON NIL TUBE: 0.02 IU/ML
QUANTIFERON TB1 TUBE: 0.03 IU/ML
QUANTIFERON TB2 TUBE: 0.03
R AXIS - MUSE: 40 DEGREES
SA 1 CELL: NORMAL
SA 1 TEST METHOD: NORMAL
SA 2 CELL: NORMAL
SA 2 TEST METHOD: NORMAL
SA1 HI RISK ABY: NORMAL
SA1 MOD RISK ABY: NORMAL
SA2 HI RISK ABY: NORMAL
SA2 MOD RISK ABY: NORMAL
SYSTOLIC BLOOD PRESSURE - MUSE: NORMAL MMHG
T AXIS - MUSE: 33 DEGREES
THROMBIN TIME: 17.2 SECONDS (ref 13–19)
UNACCEPTABLE ANTIGENS: NORMAL
UNOS CPRA: 6
VENTRICULAR RATE- MUSE: 81 BPM
ZZZSA 1  COMMENTS: NORMAL
ZZZSA 2 COMMENTS: NORMAL

## 2023-06-24 LAB
GAMMA INTERFERON BACKGROUND BLD IA-ACNC: 0.02 IU/ML
M TB IFN-G BLD-IMP: NEGATIVE
M TB IFN-G CD4+ BCKGRND COR BLD-ACNC: 9.98 IU/ML
MITOGEN IGNF BCKGRD COR BLD-ACNC: 0.01 IU/ML
MITOGEN IGNF BCKGRD COR BLD-ACNC: 0.01 IU/ML

## 2023-06-29 ENCOUNTER — ANCILLARY PROCEDURE (OUTPATIENT)
Dept: CARDIOLOGY | Facility: CLINIC | Age: 33
End: 2023-06-29
Attending: PHYSICIAN ASSISTANT
Payer: COMMERCIAL

## 2023-06-29 DIAGNOSIS — Z76.82 ORGAN TRANSPLANT CANDIDATE: ICD-10-CM

## 2023-06-29 DIAGNOSIS — I10 ESSENTIAL HYPERTENSION: ICD-10-CM

## 2023-06-29 DIAGNOSIS — Z01.818 PRE-TRANSPLANT EVALUATION FOR KIDNEY TRANSPLANT: ICD-10-CM

## 2023-06-29 DIAGNOSIS — N18.6 END STAGE RENAL DISEASE (H): ICD-10-CM

## 2023-06-29 LAB — LVEF ECHO: NORMAL

## 2023-06-29 PROCEDURE — 93306 TTE W/DOPPLER COMPLETE: CPT | Performed by: INTERNAL MEDICINE

## 2023-06-30 ENCOUNTER — COMMITTEE REVIEW (OUTPATIENT)
Dept: TRANSPLANT | Facility: CLINIC | Age: 33
End: 2023-06-30
Payer: COMMERCIAL

## 2023-06-30 ENCOUNTER — TELEPHONE (OUTPATIENT)
Dept: TRANSPLANT | Facility: CLINIC | Age: 33
End: 2023-06-30
Payer: COMMERCIAL

## 2023-06-30 DIAGNOSIS — I10 ESSENTIAL HYPERTENSION: ICD-10-CM

## 2023-06-30 DIAGNOSIS — N05.1 FSGS (FOCAL SEGMENTAL GLOMERULOSCLEROSIS): ICD-10-CM

## 2023-06-30 DIAGNOSIS — Z01.818 PRE-TRANSPLANT EVALUATION FOR KIDNEY TRANSPLANT: ICD-10-CM

## 2023-06-30 DIAGNOSIS — N18.6 END STAGE RENAL DISEASE (H): ICD-10-CM

## 2023-06-30 DIAGNOSIS — Z76.82 ORGAN TRANSPLANT CANDIDATE: ICD-10-CM

## 2023-06-30 NOTE — COMMITTEE REVIEW
Abdominal Committee Review Note     Evaluation Date: 6/22/2023  Committee Review Date: 6/28/2023    Organ being evaluated for: Kidney    Transplant Phase: Evaluation  Transplant Status: Active    Transplant Coordinator: Kaycee Burr  Transplant Surgeon: Dr. Austin Centeno     Referring Physician: Referred Self    Primary Diagnosis:   Secondary Diagnosis:     Committee Review Members:  Nephrology Lucia Chambers PA-C, Celso Gutierrez MD   Nutrition Nadine Sandy RD   Pharmacist Chantel Jennings, [facility]    - Clinical Helga Wei [facility]   Transplant GERRI MILLIGAN, RN, Miracle Allen, CITLALLI, Jermaine Machado MD, Kaycee Burr, CITLALLI, Helga Ramirez, RN, Sara Alves, RN, Chato Bermeo, CITLALLI, Fatimah Perla RN       Transplant Eligibility: Irreversible chronic kidney disease treated w/dialysis or expected need for dialysis    Committee Review Decision: Approved    Relative Contraindications: None    Absolute Contraindications: None    Committee Chair Jermaine Machado MD verbally attested to the committee's decision.    Committee Discussion Details: Reviewed pt's medical status and evaluation results to date with multidisciplinary committee.    Recommended the following evaluation items:    Cardiology: Pt will not need risk assessment d/t age and lack of risk factors  Urology: pt will need Urology clearance - may require bladder augmentation, will be referred here  GI: pt having EUS for gastric lesion  BMI: (Goal Weight 225 lbs) - pt will return to see transplant surgery once he has achieved that goal  Imaging Recommended:  CT Ab/Pelv   Will recheck Calcium level    Patient should have live donors register now to initiate donor evaluation: Yes    Committee determined that patient is a Fair candidate for Kidney     Listing plan: Will not list at this time as patient is on dialysis    A2B Candidate: No    Patient will be called and summary letter will be sent.

## 2023-06-30 NOTE — LETTER
06/30/23        Ana Ch  [address]  [place] [address]        Dear Ana,    It was a pleasure to see you recently for consideration of kidney transplantation. Your pre-transplant evaluation results were reviewed at our Multidisciplinary Selection Committee on 6/28/23. The Committee is requesting the following items are completed as part of your evaluation:    Please work on weight loss, once you are at 225 lbs or less, we will have you return to see at transplant surgeon, we will put in a referral for the weight management clinic - our schedulers will call you to arrange that visit    Urology clearance - our schedulers will call you to arrange that visit    Please watch the patient education videos online and call me when done for review of that information    Abdominal/Pelvic CT - we will obtain this imaging study once you have reached your gaol weight    Lab draw: we will repeat your Calcium level -our schedulers will call you to arrange a lab draw    Please continue the work of your gastric lesion with [facility]    For any questions, please contact the Transplant Office at [phone] or you may call me directly at [phone].      Sincerely,  Kaycee Burr RN, Pre-Kidney/Pancreas Transplant Coordinator   Solid Organ Transplant  [facility], [facility]    CC: Care Team

## 2023-06-30 NOTE — TELEPHONE ENCOUNTER
Called pt to review outcome of the Selection Committee.  We discussed that he is approved to move ahead w/ his evaluation. Once he is listed, he would like to transfer his pre-dialysis wait time from [facility] to our center.  We reviewed that he will need to complete the following items:    1. Wt loss to 225 lbs, then return to see surgeon - pt would like referral to weight management   2. Urology - ordered, not yet scheduled  3. MTP - pt will call me once he watches the videos  4. Repeat calcium - ordered, not yet scheduled  5. EUS - will be doing at [facility] (likely [place] location)  6. CT Ab/Pelv - will obtain following wt. Loss    Pt had no further questions.  He has a potential donor that will get registered with our center. Orders placed. Evaluation Summary Letter sent.

## 2023-07-20 ENCOUNTER — LAB (OUTPATIENT)
Dept: LAB | Facility: CLINIC | Age: 33
End: 2023-07-20
Attending: PHYSICIAN ASSISTANT
Payer: COMMERCIAL

## 2023-07-20 DIAGNOSIS — N18.6 END STAGE RENAL DISEASE (H): ICD-10-CM

## 2023-07-20 DIAGNOSIS — Z01.818 PRE-TRANSPLANT EVALUATION FOR KIDNEY TRANSPLANT: ICD-10-CM

## 2023-07-20 DIAGNOSIS — N05.1 FSGS (FOCAL SEGMENTAL GLOMERULOSCLEROSIS): ICD-10-CM

## 2023-07-20 DIAGNOSIS — Z76.82 ORGAN TRANSPLANT CANDIDATE: ICD-10-CM

## 2023-07-20 DIAGNOSIS — I10 ESSENTIAL HYPERTENSION: ICD-10-CM

## 2023-07-20 PROCEDURE — 36415 COLL VENOUS BLD VENIPUNCTURE: CPT

## 2023-07-20 PROCEDURE — 82310 ASSAY OF CALCIUM: CPT

## 2023-07-21 LAB — CALCIUM SERPL-MCNC: 10.6 MG/DL (ref 8.6–10)

## 2023-07-24 ENCOUNTER — VIRTUAL VISIT (OUTPATIENT)
Dept: TRANSPLANT | Facility: CLINIC | Age: 33
End: 2023-07-24
Attending: INTERNAL MEDICINE
Payer: COMMERCIAL

## 2023-07-24 ENCOUNTER — TELEPHONE (OUTPATIENT)
Dept: SURGERY | Facility: CLINIC | Age: 33
End: 2023-07-24

## 2023-07-24 VITALS — HEIGHT: 68 IN | BODY MASS INDEX: 38.65 KG/M2 | WEIGHT: 255 LBS

## 2023-07-24 DIAGNOSIS — Z76.82 ORGAN TRANSPLANT CANDIDATE: ICD-10-CM

## 2023-07-24 DIAGNOSIS — N18.6 END STAGE RENAL DISEASE (H): ICD-10-CM

## 2023-07-24 DIAGNOSIS — N05.1 FSGS (FOCAL SEGMENTAL GLOMERULOSCLEROSIS): ICD-10-CM

## 2023-07-24 DIAGNOSIS — Z01.818 PRE-TRANSPLANT EVALUATION FOR KIDNEY TRANSPLANT: ICD-10-CM

## 2023-07-24 DIAGNOSIS — I10 ESSENTIAL HYPERTENSION: ICD-10-CM

## 2023-07-24 PROCEDURE — 99203 OFFICE O/P NEW LOW 30 MIN: CPT | Mod: 95 | Performed by: PHYSICIAN ASSISTANT

## 2023-07-24 NOTE — LETTER
"    2023         RE: Ana Ch  [address]  [place] [address]        Dear Colleague,    Thank you for referring your patient, Ana Ch, to the [facility] TRANSPLANT [facility]INIC. Please see a copy of my visit note below.    20 minutes spent by me on the date of the encounter doing chart review, history and exam, documentation and further activities per the note    New Weight Management Consultation Note    2023    RE: Ana Ch  MR#: [id number]  : 1990      Referring provider:     Chief Complaint/Reason for visit: obesity, weight loss prior to kidney transplant    Dear Sky Dyer MD (General),    I had the pleasure of seeing your patient, Ana Ch, to evaluate his obesity and discussed weight management treatment options.  As you know, Ana Ch is 33 year old.  He has a height of 5' 7.992\", a weight of 255 lbs 0 oz, and calculated Body mass index is 38.78 kg/m ..  Full intake/assessment was done to determine barriers to weight loss success and develop a treatment plan. Needs to lose 30 lbs to get to 225 lbs.     Assessment & Plan  Problem List Items Addressed This Visit    None  Visit Diagnoses       Pre-transplant evaluation for kidney transplant        End stage renal disease (H)        FSGS (focal segmental glomerulosclerosis)        Organ transplant candidate        Essential hypertension                 Weight mgmt consult  ESRD due to FSGS on dialysis  Needs to lose 30 lbs for kidney tx. Saw Dr Centeno last month and AUDI Mccoy  Not interested in surgical weight loss  Discussed medication options.   Consider Wegovy or topiramate    Follow up AUDI acevedo  MTM 6-8 weeks  Frida 3-4 mo    HISTORY OF PRESENT ILLNESS:    ESKD due to FSGS  On dialysis x 4-5 months  Nephrologist: Lucia Mcwilliams    Diabetes: No    Interested in weight loss surgery: possibly    Lost 100 lbs in  when car broke down and had to walk " everywhere    Lost 60 lbs recently with diet changes and increase in walking    No hx of pancreatitis. No hx of MEN2 or MTC.     CO-MORBIDITIES OF OBESITY INCLUDE:    HTN on med    Is patient on biologics or immunomodulators?     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     Priority: Medium     Added automatically from request for surgery [id number]       HTN (hypertension) 10/02/2020     Priority: Medium     ESRD (end stage renal disease) (H) 10/02/2020     Priority: Medium     Gout 03/10/2020     Priority: Medium     Mixed anxiety and depressive disorder 03/10/2020     Priority: Medium     Obesity 03/10/2020     Priority: Medium       PAST MEDICAL HISTORY:  Past Medical History:   Diagnosis Date     Anxiety      Bladder tumor      Chronic kidney disease      Depression      Depressive disorder      Exposure to human immunodeficiency virus      Foot pain, bilateral      Gout      Hip pain      HTN (hypertension)      Hypertension     Controled with medication     Obesity      Prostatitis      Renal disease      Urinary retention      Urinary urgency        PAST SURGICAL HISTORY:  Past Surgical History:   Procedure Laterality Date     BIOPSY       CYSTOSCOPY, TRANSURETHRAL RESECTION (TUR) TUMOR BLADDER, COMBINED N/A 02/21/2023    Procedure: CYSTOSCOPY AND TRANSURETHRAL RESECTION OF BLADDER TUMORS;  Surgeon: Gaston Villarreal MD;  Location: [facility] [place]     REPAIR PTOSIS Bilateral 2007     REPAIR PTOSIS Right 11/17/2021    Procedure: Right upper ptosis repair;  Surgeon: Hair Breen MD;  Location: [facility] [place]     REPAIR PTOSIS Right 02/09/2022    Procedure: right upper eyelid ptosis repair;  Surgeon: Hair Breen MD;  Location: [facility] [place]       FAMILY HISTORY:   Family History   Problem Relation Age of Onset     Glaucoma No family hx of       Macular Degeneration No family hx of      Kidney Disease No family hx of        SOCIAL HISTORY:   Lives on his own.   Some cooking, some eating out 50/50    HABITS:    Currently taking narcotic/opioids No    PSYCHOLOGICAL HISTORY:   Depression/anxiety controlled with meds    EATING BEHAVIORS:  Boredom eating  Stress eating    EXERCISE:  Trying to walk more    MEDICATIONS:  Current Outpatient Medications   Medication Sig Dispense Refill     acetaminophen (TYLENOL) 325 MG tablet Take 325-650 mg by mouth every 6 hours as needed       allopurinol (ZYLOPRIM) 100 MG tablet Take 50 mg by mouth daily       calcitRIOL (ROCALTROL) 0.25 MCG capsule Take 0.5 mcg by mouth daily       Calcium Acetate 667 MG TABS Take 1-2 tablets by mouth 3 times daily With meals       carvedilol (COREG) 6.25 MG tablet Take 1 tablet (6.25 mg) by mouth 2 times daily (with meals)       Melatonin 10 MG TABS tablet Take 10 mg by mouth nightly as needed for sleep       NIFEdipine ER OSMOTIC (PROCARDIA XL) 30 MG 24 hr tablet Take 3 tablets (90 mg) by mouth daily       omeprazole (PRILOSEC) 40 MG DR capsule 1 CAPSULES TWICE DAILY 30 MINUTES BEFORE BREAKFAST NAD DINNER FOR 4 WEEKS THEN DAILY FOR 2 WEEKS THEN STOP       oxyCODONE (ROXICODONE) 5 MG tablet Take 1-2 tablets (5-10 mg) by mouth every 4 hours as needed for moderate to severe pain (Patient not taking: Reported on 6/22/2023) 6 tablet 0     sertraline (ZOLOFT) 100 MG tablet Take 100 mg by mouth daily       sodium bicarbonate 650 MG tablet Take 1,950 mg by mouth 3 times daily Take 3 tablets (1950 mg) by mouth every morning, every evening and every night at bedtime.       tamsulosin (FLOMAX) 0.4 MG capsule Take 0.4 mg by mouth daily (Patient not taking: Reported on 6/22/2023)       vitamin D3 (CHOLECALCIFEROL) 50 mcg (2000 units) tablet Take 1 tablet by mouth daily         ALLERGIES:  Allergies   Allergen Reactions     Nifedipine      Needs to have osmotic extended release Procardia XL otherwise  gets flushing       Lab on 07/20/2023   Component Date Value Ref Range Status     Calcium 07/20/2023 10.6 (H)  8.6 - 10.0 mg/dL Final         Anti-obesity medication ROS:    HEENT  Hx of glaucoma: No    Cardiovascular  CAD:No  HTN:Yes    Gastrointestinal  GERD: occasionally. TUMS prn  Constipation/diarrhea/GI issues:Yes constipation/diarrhea possible IBS. Seeing [facility]  Liver Dz:No  H/O Pancreatitis:No    Psychiatric  Bipolar: No  Anxiety:Yes  Depression:Yes  History of alcohol/drug abuse: No  Hx of eating disorder:No    Endocrine  Personal or family hx of MTC or MEN2:No  Diabetes/prediabetes: No    Neurologic:  Hx of seizures: No  Hx of migraines: Occasional  Memory Impairment: No  Chronic pain/opioids use: No      History of kidney stones: No  Kidney disease: Yes      PHYSICAL EXAM:  Objective   Physical Exam   GENERAL: Healthy, alert and no distress  EYES: Eyes grossly normal to inspection.  No discharge or erythema, or obvious scleral/conjunctival abnormalities.  RESP: No audible wheeze, cough, or visible cyanosis.  No visible retractions or increased work of breathing.    SKIN: Visible skin clear. No significant rash, abnormal pigmentation or lesions.  NEURO: Cranial nerves grossly intact.  Mentation and speech appropriate for age.  PSYCH: Mentation appears normal, affect normal/bright, judgement and insight intact, normal speech and appearance well-groomed.      In summary, Ana Ch has Class II obesity with a body mass index of Body mass index is 38.78 kg/m . kg/m2 and the comorbidities stated above. Discussed treatment options today.    Plan for medical weight mgmt.  Not interested in surgery at this time.  Medication plan: wegovy or topiramate  RD plan: see soon  Food tracking is a very useful tool for weight loss and helpful before RD visits      Follow up plan:  AUDI Galicia 3-4 mo  MTM pharmacist 6-8 weeks  Sincerely,     Frida Bush PA-C        Ana is a 33 year old who is  being evaluated via a billable video visit.      How would you like to obtain your AVS? MyChart  If the video visit is dropped, the invitation should be resent by: Text to cell phone: [phone]  Will anyone else be joining your video visit? No      Video-Visit Details    Type of service:  Video Visit     Originating Location (pt. Location): Home    Distant Location (provider location):  On-site  Platform used for Video Visit: [facility]      Again, thank you for allowing me to participate in the care of your patient.        Sincerely,        Frida Bush PA-C

## 2023-07-24 NOTE — PROGRESS NOTES
"20 minutes spent by me on the date of the encounter doing chart review, history and exam, documentation and further activities per the note    New Weight Management Consultation Note    2023    RE: Ana Ch  MR#: [id number]  : 1990      Referring provider:     Chief Complaint/Reason for visit: obesity, weight loss prior to kidney transplant    Dear Sky Dyer MD (General),    I had the pleasure of seeing your patient, Ana Ch, to evaluate his obesity and discussed weight management treatment options.  As you know, Ana Ch is 33 year old.  He has a height of 5' 7.992\", a weight of 255 lbs 0 oz, and calculated Body mass index is 38.78 kg/m ..  Full intake/assessment was done to determine barriers to weight loss success and develop a treatment plan. Needs to lose 30 lbs to get to 225 lbs.     Assessment & Plan   Problem List Items Addressed This Visit    None  Visit Diagnoses       Pre-transplant evaluation for kidney transplant        End stage renal disease (H)        FSGS (focal segmental glomerulosclerosis)        Organ transplant candidate        Essential hypertension                 Weight mgmt consult  ESRD due to FSGS on dialysis  Needs to lose 30 lbs for kidney tx. Saw Dr Centeno last month and AUDI Mccoy  Not interested in surgical weight loss  Discussed medication options.   Consider Wegovy or topiramate    Follow up RD soon  MTM 6-8 weeks  Frida 3-4 mo    HISTORY OF PRESENT ILLNESS:    ESKD due to FSGS  On dialysis x 4-5 months  Nephrologist: Lucia Mcwilliams    Diabetes: No    Interested in weight loss surgery: possibly    Lost 100 lbs in  when car broke down and had to walk everywhere    Lost 60 lbs recently with diet changes and increase in walking    No hx of pancreatitis. No hx of MEN2 or MTC.     CO-MORBIDITIES OF OBESITY INCLUDE:    HTN on med    Is patient on biologics or immunomodulators?     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     Priority: Medium     Added automatically from request for surgery [id number]      HTN (hypertension) 10/02/2020     Priority: Medium    ESRD (end stage renal disease) (H) 10/02/2020     Priority: Medium    Gout 03/10/2020     Priority: Medium    Mixed anxiety and depressive disorder 03/10/2020     Priority: Medium    Obesity 03/10/2020     Priority: Medium       PAST MEDICAL HISTORY:  Past Medical History:   Diagnosis Date    Anxiety     Bladder tumor     Chronic kidney disease     Depression     Depressive disorder     Exposure to human immunodeficiency virus     Foot pain, bilateral     Gout     Hip pain     HTN (hypertension)     Hypertension     Controled with medication    Obesity     Prostatitis     Renal disease     Urinary retention     Urinary urgency        PAST SURGICAL HISTORY:  Past Surgical History:   Procedure Laterality Date    BIOPSY      CYSTOSCOPY, TRANSURETHRAL RESECTION (TUR) TUMOR BLADDER, COMBINED N/A 02/21/2023    Procedure: CYSTOSCOPY AND TRANSURETHRAL RESECTION OF BLADDER TUMORS;  Surgeon: Gaston Villarreal MD;  Location: [facility]    REPAIR PTOSIS Bilateral 2007    REPAIR PTOSIS Right 11/17/2021    Procedure: Right upper ptosis repair;  Surgeon: Hair Breen MD;  [place] [facility] [place]    REPAIR PTOSIS Right 02/09/2022    Procedure: right upper eyelid ptosis repair;  Surgeon: Hair Breen MD;  [place] [facility] [place]       FAMILY HISTORY:   Family History   Problem Relation Age of Onset    Glaucoma No family hx of     Macular Degeneration No family hx of     Kidney Disease No family hx of        SOCIAL HISTORY:   Lives on his own.   Some cooking, some eating out 50/50    HABITS:    Currently taking narcotic/opioids No    PSYCHOLOGICAL HISTORY:   Depression/anxiety controlled with meds    EATING  BEHAVIORS:  Boredom eating  Stress eating    EXERCISE:  Trying to walk more    MEDICATIONS:  Current Outpatient Medications   Medication Sig Dispense Refill    acetaminophen (TYLENOL) 325 MG tablet Take 325-650 mg by mouth every 6 hours as needed      allopurinol (ZYLOPRIM) 100 MG tablet Take 50 mg by mouth daily      calcitRIOL (ROCALTROL) 0.25 MCG capsule Take 0.5 mcg by mouth daily      Calcium Acetate 667 MG TABS Take 1-2 tablets by mouth 3 times daily With meals      carvedilol (COREG) 6.25 MG tablet Take 1 tablet (6.25 mg) by mouth 2 times daily (with meals)      Melatonin 10 MG TABS tablet Take 10 mg by mouth nightly as needed for sleep      NIFEdipine ER OSMOTIC (PROCARDIA XL) 30 MG 24 hr tablet Take 3 tablets (90 mg) by mouth daily      omeprazole (PRILOSEC) 40 MG DR capsule 1 CAPSULES TWICE DAILY 30 MINUTES BEFORE BREAKFAST NAD DINNER FOR 4 WEEKS THEN DAILY FOR 2 WEEKS THEN STOP      oxyCODONE (ROXICODONE) 5 MG tablet Take 1-2 tablets (5-10 mg) by mouth every 4 hours as needed for moderate to severe pain (Patient not taking: Reported on 6/22/2023) 6 tablet 0    sertraline (ZOLOFT) 100 MG tablet Take 100 mg by mouth daily      sodium bicarbonate 650 MG tablet Take 1,950 mg by mouth 3 times daily Take 3 tablets (1950 mg) by mouth every morning, every evening and every night at bedtime.      tamsulosin (FLOMAX) 0.4 MG capsule Take 0.4 mg by mouth daily (Patient not taking: Reported on 6/22/2023)      vitamin D3 (CHOLECALCIFEROL) 50 mcg (2000 units) tablet Take 1 tablet by mouth daily         ALLERGIES:  Allergies   Allergen Reactions    Nifedipine      Needs to have osmotic extended release Procardia XL otherwise gets flushing       Lab on 07/20/2023   Component Date Value Ref Range Status    Calcium 07/20/2023 10.6 (H)  8.6 - 10.0 mg/dL Final         Anti-obesity medication ROS:    HEENT  Hx of glaucoma: No    Cardiovascular  CAD:No  HTN:Yes    Gastrointestinal  GERD: occasionally. TUMS  prn  Constipation/diarrhea/GI issues:Yes constipation/diarrhea possible IBS. Seeing [facility]  Liver Dz:No  H/O Pancreatitis:No    Psychiatric  Bipolar: No  Anxiety:Yes  Depression:Yes  History of alcohol/drug abuse: No  Hx of eating disorder:No    Endocrine  Personal or family hx of MTC or MEN2:No  Diabetes/prediabetes: No    Neurologic:  Hx of seizures: No  Hx of migraines: Occasional  Memory Impairment: No  Chronic pain/opioids use: No      History of kidney stones: No  Kidney disease: Yes      PHYSICAL EXAM:  Objective    Physical Exam   GENERAL: Healthy, alert and no distress  EYES: Eyes grossly normal to inspection.  No discharge or erythema, or obvious scleral/conjunctival abnormalities.  RESP: No audible wheeze, cough, or visible cyanosis.  No visible retractions or increased work of breathing.    SKIN: Visible skin clear. No significant rash, abnormal pigmentation or lesions.  NEURO: Cranial nerves grossly intact.  Mentation and speech appropriate for age.  PSYCH: Mentation appears normal, affect normal/bright, judgement and insight intact, normal speech and appearance well-groomed.      In summary, Ana Ch has Class II obesity with a body mass index of Body mass index is 38.78 kg/m . kg/m2 and the comorbidities stated above. Discussed treatment options today.    Plan for medical weight mgmt.  Not interested in surgery at this time.  Medication plan: wegovy or topiramate  RD plan: see soon  Food tracking is a very useful tool for weight loss and helpful before RD visits      Follow up plan:  RD kristina Galicia 3-4 mo  MTM pharmacist 6-8 weeks  Sincerely,     Frida Bush PA-C        Ana is a 33 year old who is being evaluated via a billable video visit.      How would you like to obtain your AVS? MyChart  If the video visit is dropped, the invitation should be resent by: Text to cell phone: [phone]  Will anyone else be joining your video visit? No      Video-Visit Details    Type of  service:  Video Visit     Originating Location (pt. Location): Home    Distant Location (provider location):  On-site  Platform used for Video Visit: Marianne

## 2023-08-01 NOTE — TELEPHONE ENCOUNTER
Action 2023 JtV 4:11 PM    Action Taken CSS sent an urgent request to [facility] for images to be pushed.      Action August 10, 2023 JTV 12:42 PM    Action Taken BAM received and resolved images from [facility].      MEDICAL RECORDS REQUEST   [facility]  Urology Clinic  [address] [place] [address]  PHONE: [phone]  Fax: [phone]        FUTURE VISIT INFORMATION                                                   Ana Ch, : 1990 scheduled for future visit at [facility]    APPOINTMENT INFORMATION:  Date: 2023  Provider: Chantel Rodriguez PA-C  Reason for Visit/Diagnosis: kidney transplant clearance    REFERRAL INFORMATION:  Referring provider:  Lucia Knutson PA-C in  [facility]      RECORDS REQUESTED FOR VISIT                                                     NOTES  STATUS/DETAILS   OFFICE NOTE from referring provider  yes, 2023 -- Lucia Knutson PA-C in  [facility]   OFFICE NOTE from other specialist  yes   OPERATIVE REPORT  yes   MEDICATION LIST  yes   LABS     URINALYSIS (UA)  yes   images        yes 2023 -- XR CHEST    ALLINA  2023 -- XR RETROGRADE PERITONEAL  2023 -- CT ABD PELVIS       PRE-VISIT CHECKLIST      Joint diagnostic appointment coordinated correctly          (ensure right order & amount of time) Yes   RECORD COLLECTION COMPLETE yes

## 2023-08-29 ENCOUNTER — PRE VISIT (OUTPATIENT)
Dept: UROLOGY | Facility: CLINIC | Age: 33
End: 2023-08-29

## 2023-08-29 ENCOUNTER — OFFICE VISIT (OUTPATIENT)
Dept: UROLOGY | Facility: CLINIC | Age: 33
End: 2023-08-29
Attending: PHYSICIAN ASSISTANT
Payer: COMMERCIAL

## 2023-08-29 VITALS
HEART RATE: 87 BPM | DIASTOLIC BLOOD PRESSURE: 74 MMHG | SYSTOLIC BLOOD PRESSURE: 116 MMHG | WEIGHT: 255 LBS | HEIGHT: 68 IN | BODY MASS INDEX: 38.65 KG/M2

## 2023-08-29 DIAGNOSIS — Z76.82 ORGAN TRANSPLANT CANDIDATE: ICD-10-CM

## 2023-08-29 DIAGNOSIS — Z01.818 PRE-TRANSPLANT EVALUATION FOR KIDNEY TRANSPLANT: ICD-10-CM

## 2023-08-29 DIAGNOSIS — N30.80 POLYPOID CYSTITIS: Primary | ICD-10-CM

## 2023-08-29 DIAGNOSIS — N05.1 FSGS (FOCAL SEGMENTAL GLOMERULOSCLEROSIS): ICD-10-CM

## 2023-08-29 DIAGNOSIS — N13.30 BILATERAL HYDRONEPHROSIS: ICD-10-CM

## 2023-08-29 DIAGNOSIS — N18.6 END STAGE RENAL DISEASE (H): ICD-10-CM

## 2023-08-29 PROCEDURE — 99244 OFF/OP CNSLTJ NEW/EST MOD 40: CPT | Performed by: PHYSICIAN ASSISTANT

## 2023-08-29 NOTE — PATIENT INSTRUCTIONS
UROLOGY CLINIC VISIT PATIENT INSTRUCTIONS    Schedule video urodynamics, then an appointment with Dr. Wynn to review. Same day if possible but needs to be early AM, otherwise different days are fine.    Lab appointment a week prior to urodynamics for a urine sample.     URODYNAMIC TESTING    Where should I go for this test?  The procedure is performed at:     [facility]   [address] [place] [address]   Floor 4    If you have questions about your test, please call our nurse triage line at [phone], option #2. If you need to cancel or reschedule your test for any reason, please notify us as soon as possible.    Please check in approximately 15 minutes prior to your procedure time.      What is urodynamic testing?   Urodynamic testing refers to a group of tests used to assess bladder function by measuring various aspects of urine storage and emptying. The test takes about 75 minutes. For most patients, the test is not painful.     How should I get ready for this test?  Please try to arrive with a comfortably full bladder if possible because you will be asked to try and urinate prior to your study.  If you received a bladder diary, please complete this prior to your urodynamic test and bring it with you to your appointment. A bladder diary measures how much fluid you are drinking and how often and how much you are urinating. You can also record any urinary leakage that may have occurred and what you were doing when you leaked.    If you have chronic constipation, please take stool softeners for two days before your test.    What happens during the test?  You will first be asked to empty your bladder in a private restroom into a special toilet called a uroflow machine which measures the rate of urine flow.  A nurse will then place a very small tube (called a catheter) into your bladder. This drains any urine left over after urinating and also  measures the pressures inside of your bladder during your test. Another small catheter will then be placed into your rectum to measure abdominal pressures. Two small sticky patches will be placed on the skin near your anus to measure pelvic floor function.   We will then instill contrast dye into your bladder through the bladder catheter. The contrast is very dense and will allow us to take x-ray pictures of your bladder intermittently during your test.  You will be asked to tell us when you first start to feel like your bladder is filling up, when you have moderate urgency to urinate, when you have very strong urgency to urinate and finally when you feel that your bladder is full. Once you feel full you will be asked to try and urinate.    You may be asked to cough or bear down several times during your procedure. The provider running your study will be looking for urine leakage during this time.      What happens after the test?  The provider running your urodynamic study will share the results of your test on the day of your procedure or very soon after.  After your test, you may go about your day as normal. You may notice some blood in your urine for a couple of days which should clear up on its own. You may also feel a more urgent need to use the toilet or you may need to go more often - this is due to having a catheter placed and should resolve on its own in a few days.      If you have any issues, questions or concerns in the meantime, do not hesitate to contact us at [phone] or via [email or web address].     It was a pleasure meeting with you today.  Thank you for allowing me and my team the privilege of caring for you today.  YOU are the reason we are here, and I truly hope we provided you with the excellent service you deserve.  Please let us know if there is anything else we can do for you so that we can be sure you are leaving completely satisfied with your care experience.

## 2023-08-29 NOTE — LETTER
8/29/2023       RE: Ana Ch  [address]  [place] [address]     Dear Colleague,    Thank you for referring your patient, Ana Ch, to the [facility] [place] at [facility]. Please see a copy of my visit note below.      Name: Ana Ch    MRN: [id number]   YOB: 1990                 Chief Complaint:   Kidney transplant clearance         Assessment and Plan:   33 year old male with ESRD from FSGS on HD as well as complex urologic history including severe polypoid cystitis of unclear etiology and bilateral hydroureteronephrosis. Cystoscopy and cystogram with outside urology group showed significant abnormalities including very thick bladder wall, small bladder capacity, and high pressures on minimal bladder filling. Left RPG showed narrowing of a 2-3 cm segment of the distal ureter with severe proximal hydroureteronephrosis. Right RPG could not be performed due to inability to find the right UO secondary to severely thickened and inflamed bladder wall with bullous edema. There has been no evidence for malignancy on repeated bladder biopsies. He was referred to tertiary care center for continued management.   -Plan for video urodynamics, then follow up with Dr. Wynn to review.    Chantel Rodriguez PA-C  August 29, 2023          History of Present Illness:   Ana Ch is a 33 year old male with PMH of ESKD from FSGS seen in consultation from Lucia Knutson PA-C for urology evaluation and clearance prior to kidney transplant.    Brief urologic summary reviewed from [facility] note on 6/22/23 as follows:  # Complex Urologic Issues: with cysto/TURBT April 2023 showing diffuse cystitis, very thickened bladder wall, small bladder capacity, high pressures upon minimal filling, right-sided distal ureteral stenosis with severe proximal hydroureteronephrosis, and abnormal appearance of the right half of  "the bladder. No evidence of atypia or malignancy. Etiology not known at this time. Will refer to urology here.     TODAY   8/29/2023:  Ana states that urinary symptoms started last November with pain emptying his bladder. He was given a referral to [facility] and was diagnosed with prostatitis. Then had office cystoscopy in Jan 2023 which was abnormal. Cysto with TURBT 2/21/23:        Surg path from 2/21/23:      Then on 4/27/23 he underwent cystoscopy, cystogram, left RPG, and bladder biopsies.             Currently, Ana reports mild pain with urination that has improved from prior.   Since starting dialysis in March, his urine output has reduced. However, he stills voids 4-5 times per day but in small volumes. He feels to empty his bladder. No recent gross hematuria.   Reports that he is on Doxycycline 100 mg once daily \"indefinitely.\"          Past Medical History:     Past Medical History:   Diagnosis Date    Anxiety     Bladder tumor     Chronic kidney disease     Depression     Depressive disorder     Exposure to human immunodeficiency virus     Foot pain, bilateral     Gout     Hip pain     HTN (hypertension)     Hypertension     Controled with medication    Obesity     Prostatitis     Renal disease     Urinary retention     Urinary urgency             Past Surgical History:     Past Surgical History:   Procedure Laterality Date    BIOPSY      CYSTOSCOPY, TRANSURETHRAL RESECTION (TUR) TUMOR BLADDER, COMBINED N/A 02/21/2023    Procedure: CYSTOSCOPY AND TRANSURETHRAL RESECTION OF BLADDER TUMORS;  Surgeon: Gaston Villarreal MD;  Location: [facility] OR    REPAIR PTOSIS Bilateral 2007    REPAIR PTOSIS Right 11/17/2021    Procedure: Right upper ptosis repair;  Surgeon: Hair Breen MD;  [place] [facility] [place] [place]    REPAIR PTOSIS Right 02/09/2022    Procedure: right upper eyelid ptosis repair;  Surgeon: Hair Breen MD;  [place] [facility] [place] [place]            Social History:     Social History     Tobacco " Use    Smoking status: Never    Smokeless tobacco: Never   Substance Use Topics    Alcohol use: Yes     Comment: 5/10/23-One drink per week            Family History:     Family History   Problem Relation Age of Onset    Glaucoma No family hx of     Macular Degeneration No family hx of     Kidney Disease No family hx of             Allergies:     Allergies   Allergen Reactions    Nifedipine      Needs to have osmotic extended release Procardia XL otherwise gets flushing            Medications:     Current Outpatient Medications   Medication Sig    acetaminophen (TYLENOL) 325 MG tablet Take 325-650 mg by mouth every 6 hours as needed    allopurinol (ZYLOPRIM) 100 MG tablet Take 50 mg by mouth daily    carvedilol (COREG) 6.25 MG tablet Take 1 tablet (6.25 mg) by mouth 2 times daily (with meals)    NIFEdipine ER OSMOTIC (PROCARDIA XL) 30 MG 24 hr tablet Take 3 tablets (90 mg) by mouth daily    sertraline (ZOLOFT) 100 MG tablet Take 100 mg by mouth daily    vitamin D3 (CHOLECALCIFEROL) 50 mcg (2000 units) tablet Take 1 tablet by mouth daily    calcitRIOL (ROCALTROL) 0.25 MCG capsule Take 0.5 mcg by mouth daily    Calcium Acetate 667 MG TABS Take 1-2 tablets by mouth 3 times daily With meals    Melatonin 10 MG TABS tablet Take 10 mg by mouth nightly as needed for sleep (Patient not taking: Reported on 8/29/2023)    omeprazole (PRILOSEC) 40 MG DR capsule 1 CAPSULES TWICE DAILY 30 MINUTES BEFORE BREAKFAST NAD DINNER FOR 4 WEEKS THEN DAILY FOR 2 WEEKS THEN STOP (Patient not taking: Reported on 8/29/2023)    oxyCODONE (ROXICODONE) 5 MG tablet Take 1-2 tablets (5-10 mg) by mouth every 4 hours as needed for moderate to severe pain (Patient not taking: Reported on 6/22/2023)    sodium bicarbonate 650 MG tablet Take 1,950 mg by mouth 3 times daily Take 3 tablets (1950 mg) by mouth every morning, every evening and every night at bedtime.    tamsulosin (FLOMAX) 0.4 MG capsule Take 0.4 mg by mouth daily (Patient not taking:  "Reported on 6/22/2023)     No current facility-administered medications for this visit.             Review of Systems:    ROS: 14 point ROS neg other than the symptoms noted above in the HPI and PMH.          Physical Exam:   /74   Pulse 87   Ht 1.727 m (5' 8\")   Wt 115.7 kg (255 lb)   BMI 38.77 kg/m    General: age-appropriate appearing male in NAD.   HEENT: Head AT/NC, EOMI, CN Grossly intact  Resp: no respiratory distress.  : deferred  Rectal exam: deferred  Neuro: grossly intact  Motor: excellent strength throughout  Skin: clear of rashes or ecchymoses.        Labs:      Lab Results   Component Value Date    CULTURE 10,000-50,000 CFU/mL Mixture of urogenital emir 06/22/2023     Color Urine (no units)   Date Value   06/22/2023 Yellow     Appearance Urine (no units)   Date Value   06/22/2023 Slightly Cloudy (A)     Glucose Urine (mg/dL)   Date Value   06/22/2023 Negative     Bilirubin Urine (no units)   Date Value   06/22/2023 Negative     Ketones Urine (mg/dL)   Date Value   06/22/2023 Trace (A)     Specific Gravity Urine (no units)   Date Value   06/22/2023 1.023     pH Urine (no units)   Date Value   06/22/2023 6.0     Protein Albumin Urine (mg/dL)   Date Value   06/22/2023 300 (A)     Urobilinogen Urine (E.U./dL)   Date Value   03/25/2023 0.2     Nitrite Urine (no units)   Date Value   06/22/2023 Negative     Leukocyte Esterase Urine (no units)   Date Value   06/22/2023 Large (A)       Creatinine   Date Value Ref Range Status   06/22/2023 7.37 (H) 0.67 - 1.17 mg/dL Final         Imaging:    CT abdomen/pelvis without contrast   3/9/2023    FINDINGS:   Hazy ground-glass attenuation scattered through the lung bases suggesting   some pulmonary edema. Heart size is normal. No pericardial or pleural   effusion. Atrophy of the native kidneys with hydronephrosis and   hydroureter bilaterally. Thick-walled urinary bladder. Perivesicular   moderately dense inflammation. Peroneal dialysis catheter through " the left   periumbilical abdominal wall collaterals in the ventral left lower   peritoneal margin this abuts small bowel loop but there is no surrounding   organized fluid. No obvious kinking of the catheter within the peritoneal   more through the abdominal wall. This may be a recently placed catheter is   there is some subcutaneous air in hazy and patchy reticular edema and   fluid attenuation in the abdominal wall adipose. No dilated or inflamed   large or small bowel.     IMPRESSION:   No obvious CT evidence to account for catheter dysfunction. No significant   fluid in the peritoneum. Mild pulmonary edema.       Outside records:    I spent 20 minutes reviewing outside records.       45 minutes spent on the date of the encounter doing chart review, review of outside records, review of test results, interpretation of tests, patient visit, and documentation

## 2023-08-29 NOTE — TELEPHONE ENCOUNTER
Consult for bladder wall thickening, poor bladder compliance, diffuse cystitis (transplant candidate).  Records available in [facility]

## 2023-08-29 NOTE — NURSING NOTE
"Chief Complaint   Patient presents with    Consult For     Bladder wall thickening, poor bladder compliance, diffuse cystitis (transplant candidate)       Blood pressure 116/74, pulse 87, height 1.727 m (5' 8\"), weight 115.7 kg (255 lb). Body mass index is 38.77 kg/m .    Patient Active Problem List   Diagnosis    HTN (hypertension)    ESRD (end stage renal disease) (H)    Gout    Mixed anxiety and depressive disorder    Obesity    Congenital ptosis of eyelid    Focal segmental glomerulosclerosis    Morbid obesity (H)       Allergies   Allergen Reactions    Nifedipine      Needs to have osmotic extended release Procardia XL otherwise gets flushing       Current Outpatient Medications   Medication Sig Dispense Refill    acetaminophen (TYLENOL) 325 MG tablet Take 325-650 mg by mouth every 6 hours as needed      allopurinol (ZYLOPRIM) 100 MG tablet Take 50 mg by mouth daily      carvedilol (COREG) 6.25 MG tablet Take 1 tablet (6.25 mg) by mouth 2 times daily (with meals)      NIFEdipine ER OSMOTIC (PROCARDIA XL) 30 MG 24 hr tablet Take 3 tablets (90 mg) by mouth daily      sertraline (ZOLOFT) 100 MG tablet Take 100 mg by mouth daily      vitamin D3 (CHOLECALCIFEROL) 50 mcg (2000 units) tablet Take 1 tablet by mouth daily      calcitRIOL (ROCALTROL) 0.25 MCG capsule Take 0.5 mcg by mouth daily      Calcium Acetate 667 MG TABS Take 1-2 tablets by mouth 3 times daily With meals      Melatonin 10 MG TABS tablet Take 10 mg by mouth nightly as needed for sleep (Patient not taking: Reported on 8/29/2023)      omeprazole (PRILOSEC) 40 MG DR capsule 1 CAPSULES TWICE DAILY 30 MINUTES BEFORE BREAKFAST NAD DINNER FOR 4 WEEKS THEN DAILY FOR 2 WEEKS THEN STOP (Patient not taking: Reported on 8/29/2023)      oxyCODONE (ROXICODONE) 5 MG tablet Take 1-2 tablets (5-10 mg) by mouth every 4 hours as needed for moderate to severe pain (Patient not taking: Reported on 6/22/2023) 6 tablet 0    sodium bicarbonate 650 MG tablet Take 1,950 mg " by mouth 3 times daily Take 3 tablets (1950 mg) by mouth every morning, every evening and every night at bedtime.      tamsulosin (FLOMAX) 0.4 MG capsule Take 0.4 mg by mouth daily (Patient not taking: Reported on 6/22/2023)         Social History     Tobacco Use    Smoking status: Never    Smokeless tobacco: Never   Substance Use Topics    Alcohol use: Yes     Comment: 5/10/23-One drink per week    Drug use: Never       Cayden Sarmiento  8/29/2023  1:01 PM

## 2023-09-12 ENCOUNTER — PRE VISIT (OUTPATIENT)
Dept: UROLOGY | Facility: CLINIC | Age: 33
End: 2023-09-12

## 2023-09-20 ENCOUNTER — TELEPHONE (OUTPATIENT)
Dept: TRANSPLANT | Facility: CLINIC | Age: 33
End: 2023-09-20
Payer: COMMERCIAL

## 2023-09-20 NOTE — TELEPHONE ENCOUNTER
Called pt's dialysis unit and explained that his calcium was elevated at evaluation and again on re-check, asked that they ensure Dr. Mcwilliams is aware and following. They agreed to pass the message along.    Called [facility], left message for pt's coordinator, Ekaterina to call me at direct line to discuss transfer time.

## 2023-09-20 NOTE — TELEPHONE ENCOUNTER
Coordinator at [facility] returned my call, the pt has been listed there since March 2021 (pre-dialysis).  She stated he can remain inactive with them until he is ready to be listed with us.  They wanted an update on his evaluation status.  We discussed that he needs to lose about 30 lbs, complete EUS, get Urology clearance, and do abdominal imaging following weight loss.  She stated they will keep him on their list inactive until he is ready to be listed with us.  I will let them know when he is approved for listing here.  She had no further questions.

## 2023-09-21 ENCOUNTER — DOCUMENTATION ONLY (OUTPATIENT)
Dept: UROLOGY | Facility: CLINIC | Age: 33
End: 2023-09-21

## 2023-09-21 DIAGNOSIS — N30.80 POLYPOID CYSTITIS: Primary | ICD-10-CM

## 2023-09-21 NOTE — PROGRESS NOTES
Ana Ch has an upcoming lab appointment.        Future Appointments   Date Time Provider Department Center   9/29/2023  4:15 PM [facility]   10/5/2023  7:00 AM Chantel Rodriguez PA-C [facility]   10/9/2023  7:15 AM Aldair Wynn MD [facility]       The appointment note says: labs prior to UDS    There is no Lab order available.      Please review and place future orders as appropriate.  If no Lab order will be placed, please advise patient.      Also for consideration: HMPO    Health Maintenance Due   Topic    LIPID     MICROALBUMIN     PARATHYROID     PHOSPHORUS     ANNUAL REVIEW OF HM ORDERS     BMP        Thanks,    Mary Kate Hernandez

## 2023-09-22 ENCOUNTER — PRE VISIT (OUTPATIENT)
Dept: UROLOGY | Facility: CLINIC | Age: 33
End: 2023-09-22
Payer: COMMERCIAL

## 2023-09-22 NOTE — TELEPHONE ENCOUNTER
UDS for polypoid cystitis/bilateral hydronephrosis (transplant candidate).  Records available in [facility].

## 2023-09-29 ENCOUNTER — LAB (OUTPATIENT)
Dept: LAB | Facility: CLINIC | Age: 33
End: 2023-09-29
Payer: COMMERCIAL

## 2023-09-29 ENCOUNTER — PRE VISIT (OUTPATIENT)
Dept: UROLOGY | Facility: CLINIC | Age: 33
End: 2023-09-29

## 2023-09-29 DIAGNOSIS — N30.80 POLYPOID CYSTITIS: ICD-10-CM

## 2023-09-29 LAB
ALBUMIN UR-MCNC: >=300 MG/DL
APPEARANCE UR: CLEAR
BILIRUB UR QL STRIP: NEGATIVE
COLOR UR AUTO: YELLOW
GLUCOSE UR STRIP-MCNC: NEGATIVE MG/DL
GRAN CASTS #/AREA URNS LPF: ABNORMAL /LPF
HGB UR QL STRIP: NEGATIVE
KETONES UR STRIP-MCNC: ABNORMAL MG/DL
LEUKOCYTE ESTERASE UR QL STRIP: NEGATIVE
NITRATE UR QL: NEGATIVE
PH UR STRIP: 6 [PH] (ref 5–7)
RBC #/AREA URNS AUTO: ABNORMAL /HPF
SP GR UR STRIP: 1.02 (ref 1–1.03)
UROBILINOGEN UR STRIP-ACNC: 0.2 E.U./DL
WBC #/AREA URNS AUTO: ABNORMAL /HPF

## 2023-09-29 PROCEDURE — 81001 URINALYSIS AUTO W/SCOPE: CPT

## 2023-09-29 PROCEDURE — 87086 URINE CULTURE/COLONY COUNT: CPT

## 2023-10-01 LAB — BACTERIA UR CULT: NORMAL

## 2023-10-05 ENCOUNTER — ALLIED HEALTH/NURSE VISIT (OUTPATIENT)
Dept: UROLOGY | Facility: CLINIC | Age: 33
End: 2023-10-05
Payer: COMMERCIAL

## 2023-10-05 ENCOUNTER — ANCILLARY PROCEDURE (OUTPATIENT)
Dept: RADIOLOGY | Facility: AMBULATORY SURGERY CENTER | Age: 33
End: 2023-10-05
Attending: PHYSICIAN ASSISTANT
Payer: COMMERCIAL

## 2023-10-05 VITALS
BODY MASS INDEX: 38.65 KG/M2 | HEIGHT: 68 IN | SYSTOLIC BLOOD PRESSURE: 113 MMHG | WEIGHT: 255 LBS | DIASTOLIC BLOOD PRESSURE: 82 MMHG | HEART RATE: 78 BPM

## 2023-10-05 DIAGNOSIS — R39.89 URINARY PROBLEM: ICD-10-CM

## 2023-10-05 DIAGNOSIS — N13.30 BILATERAL HYDRONEPHROSIS: ICD-10-CM

## 2023-10-05 DIAGNOSIS — N30.80 POLYPOID CYSTITIS: Primary | ICD-10-CM

## 2023-10-05 PROCEDURE — 51600 INJECTION FOR BLADDER X-RAY: CPT | Performed by: PHYSICIAN ASSISTANT

## 2023-10-05 PROCEDURE — 51741 ELECTRO-UROFLOWMETRY FIRST: CPT | Performed by: PHYSICIAN ASSISTANT

## 2023-10-05 PROCEDURE — 74455 X-RAY URETHRA/BLADDER: CPT | Performed by: PHYSICIAN ASSISTANT

## 2023-10-05 PROCEDURE — 51728 CYSTOMETROGRAM W/VP: CPT | Performed by: PHYSICIAN ASSISTANT

## 2023-10-05 PROCEDURE — 51784 ANAL/URINARY MUSCLE STUDY: CPT | Performed by: PHYSICIAN ASSISTANT

## 2023-10-05 PROCEDURE — 51797 INTRAABDOMINAL PRESSURE TEST: CPT | Performed by: PHYSICIAN ASSISTANT

## 2023-10-05 RX ORDER — IOPAMIDOL 510 MG/ML
100 INJECTION, SOLUTION INTRAVASCULAR ONCE
Status: COMPLETED | OUTPATIENT
Start: 2023-10-05 | End: 2023-10-05

## 2023-10-05 RX ORDER — CEPHALEXIN 500 MG/1
500 CAPSULE ORAL ONCE
Status: COMPLETED | OUTPATIENT
Start: 2023-10-05 | End: 2023-10-05

## 2023-10-05 RX ORDER — DOXYCYCLINE HYCLATE 100 MG
100 TABLET ORAL 2 TIMES DAILY
COMMUNITY

## 2023-10-05 RX ADMIN — IOPAMIDOL 100 ML: 510 INJECTION, SOLUTION INTRAVASCULAR at 08:04

## 2023-10-05 RX ADMIN — CEPHALEXIN 500 MG: 500 CAPSULE ORAL at 07:38

## 2023-10-05 ASSESSMENT — PAIN SCALES - GENERAL: PAINLEVEL: NO PAIN (0)

## 2023-10-05 NOTE — PROGRESS NOTES
PREPROCEDURE DIAGNOSES:    1. Polypoid cystitis  2. Bilateral hydronephrosis  3. ESRD from FSGS on HD    POSTPROCEDURE DIAGNOSES:  -Small maximum cystometric capacity 140 mL with early filling sensations.  -Fair bladder compliance. End filling detrusor pressure is 12 cm H2O for a compliance ratio of 11.6.  -No uninhibited detrusor contractions or urinary incontinence.  -Maximum detrusor contraction during voiding reaches 60 cm H2O, which is followed by a stronger post-void detrusor contraction upwards of 95 cm H2O which does not result in additional flow. He voids 129 mL with Qmax 18 ml/s, continuous flow curve, quiet EMG activity, good bladder emptying (PVR 10 mL), and no evidence for bladder outlet obstruction (BOOI 18.2).  -Fluoroscopy reveals a moderately trabeculated bladder wall without diverticuli or vesicoureteral reflux. The bladder neck is closed during filling and open during voiding.     PROCEDURE:    1. Sterile urethral catheterization for measurement of residual urine volume.  2. Complex filling cystometrogram with measurement of bladder and rectal pressures.  3. Complex voiding cystometrogram with measurement of bladder and rectal pressures.  4. Electromyography of the pelvic floor during urodynamics.  5. Fluoroscopic imaging of the bladder during urodynamics, at least 3 views.    6. Interpretation of urodynamics and flouroscopic imaging.      INDICATIONS FOR PROCEDURE: Ana Ch is a pleasant 33 year old male with ESRD from FSGS on HD as well as complex urologic history including severe polypoid cystitis of unclear etiology and bilateral hydroureteronephrosis. Cystoscopy and cystogram with outside urology group showed significant abnormalities including very thick bladder wall, small bladder capacity, and high pressures on minimal bladder filling. Left RPG showed narrowing of a 2-3 cm segment of the distal ureter with severe proximal hydroureteronephrosis. Right RPG could not be performed due  to inability to find the right UO secondary to severely thickened and inflamed bladder wall with bullous edema. There has been no evidence for malignancy on repeated bladder biopsies. He was referred to tertiary care center for continued management. . Video urodynamic assessment is requested today to better characterize Ana Ch's voiding dysfunction.      VOIDING DIARY:  Did not complete.     DESCRIPTION OF PROCEDURE:  Risks, benefits, and alternatives to urodynamics were discussed with the patient and he wished to proceed.  Urodynamics are planned to better assess the primary etiology for Mr. Ch's urologic dysfunction.  The patient does not currently take anticholinergic or beta 3 agonist medications for his bladder.  After informed consent was obtained, the patient was taken to the procedure room where uroflowmetry was performed. Findings below.     PRE-STUDY UROFLOWMETRY:  Not performed as patient had no urge to void.  Residual by catheter: 0 mL.  Unable to perform urine dipstick analysis given no urine in the bladder. However, UA/UC on 9/29/23 were negative for UTI and patient denies any UTI symptoms today.     Next a 7F double-lumen urodynamics catheter was inserted into the bladder under sterile technique via urethra.  A 7F abdominal manometry catheter was placed in the rectum.  EMG pads were placed on both sides of the anal verge.  The bladder was filled with 100 mL of Isovue-250 at 30 mL/minute and serial pressures were recorded.  With coughing there was an appropriate rise in vesical and abdominal pressures with no change in detrusor pressure, confirming good study catheter placement.    DURING THE FILLING PHASE:  First sensation: 63 mL.  First Desire: 87 mL.  Strong Desire: 107 mL.  Maximum Capacity: 139 mL.    Uninhibited detrusor contractions: none.  Compliance: fair PDet=12 cmH20 at capacity. Compliance ratio of 11.6.  Continence: no DOI or BERNIE.  EMG: concordant during filling.    DURING  THE VOIDING PHASE:  Maximum detrusor contraction with void: 60 cm of H2O pressure, with a stronger post-void detrusor contraction upwards of 95 cm H2O without additional resulting flow.  Voided volume: 129 mL.  Maximum flow rate: 18 mL/sec.  Average flow rate: 7.7 mL/sec.  Postvoid Residual: 10 mL.  EMG activity: quiet.  Character of voiding curve: continuous.  BOOI: 18.2 (suggesting no obstruction - see key below)  [obstructed (REYNOSO index [BOOI] ? 40); equivocal (no definite   obstruction; BOOI 20-40); and no obstruction (BOOI ? 20)]    FLUOROSCOPIC IMAGING OF THE BLADDER DURING URODYNAMICS:  Please note, image numbers on UDS tracings correlate with iSite series numbers on PACS images. Fluoroscopy during today's procedure demonstrated a moderately trabeculated bladder wall without diverticuli.  No vesicoureteral reflux was observed.  The bladder neck was closed during filling and open during voiding.  After voiding to completion, all catheters were removed.    ASSESSMENT/PLAN:  Mr. Ana Ch is a pleasant 33 year old male with polypoid cystitis who demonstrated the following findings today on urodynamic evaluation:    -Small maximum cystometric capacity 140 mL with early filling sensations.  -Fair bladder compliance. End filling detrusor pressure is 12 cm H2O for a compliance ratio of 11.6.  -No uninhibited detrusor contractions or urinary incontinence.  -Maximum detrusor contraction during voiding reaches 60 cm H2O, which is followed by a stronger post-void detrusor contraction upwards of 95 cm H2O which does not result in additional flow. He voids 129 mL with Qmax 18 ml/s, continuous flow curve, quiet EMG activity, good bladder emptying (PVR 10 mL), and no evidence for bladder outlet obstruction (BOOI 18.2).  -Fluoroscopy reveals a moderately trabeculated bladder wall without diverticuli or vesicoureteral reflux. The bladder neck is closed during filling and open during voiding.     The patient will follow  up as scheduled with Dr. Wynn to further discuss today's study results and make plans for how best to proceed.      - A single cephalexin antibiotic was provided for UTI prophylaxis following completion of today's study per department protocol.  The risk of UTI with VUDS is low at ~2.5-3%.      Thank you for allowing me to participate in the care of Mr. Ana Ch and please don't hesitate to contact me with any questions or concerns.      Chantel Rodriguez PA-C  Urology Physician Assistant

## 2023-10-05 NOTE — NURSING NOTE
"  Chief Complaint   Patient presents with    Urodynamics Study     Polypoid cystitis  Bilateral hydronephrosis       Blood pressure 113/82, pulse 78, height 1.727 m (5' 8\"), weight 115.7 kg (255 lb). Body mass index is 38.77 kg/m .    Patient Active Problem List   Diagnosis    HTN (hypertension)    ESRD (end stage renal disease) (H)    Gout    Mixed anxiety and depressive disorder    Obesity    Congenital ptosis of eyelid    Focal segmental glomerulosclerosis    Morbid obesity (H)       Allergies   Allergen Reactions    Nifedipine      Needs to have osmotic extended release Procardia XL otherwise gets flushing       Current Outpatient Medications   Medication Sig Dispense Refill    acetaminophen (TYLENOL) 325 MG tablet Take 325-650 mg by mouth every 6 hours as needed      allopurinol (ZYLOPRIM) 100 MG tablet Take 50 mg by mouth daily      carvedilol (COREG) 6.25 MG tablet Take 1 tablet (6.25 mg) by mouth 2 times daily (with meals)      doxycycline hyclate (VIBRA-TABS) 100 MG tablet Take 100 mg by mouth 2 times daily      NIFEdipine ER OSMOTIC (PROCARDIA XL) 30 MG 24 hr tablet Take 3 tablets (90 mg) by mouth daily      sertraline (ZOLOFT) 100 MG tablet Take 100 mg by mouth daily      calcitRIOL (ROCALTROL) 0.25 MCG capsule Take 0.5 mcg by mouth daily      Calcium Acetate 667 MG TABS Take 1-2 tablets by mouth 3 times daily With meals      Melatonin 10 MG TABS tablet Take 10 mg by mouth nightly as needed for sleep (Patient not taking: Reported on 8/29/2023)      omeprazole (PRILOSEC) 40 MG DR capsule 1 CAPSULES TWICE DAILY 30 MINUTES BEFORE BREAKFAST NAD DINNER FOR 4 WEEKS THEN DAILY FOR 2 WEEKS THEN STOP (Patient not taking: Reported on 8/29/2023)      oxyCODONE (ROXICODONE) 5 MG tablet Take 1-2 tablets (5-10 mg) by mouth every 4 hours as needed for moderate to severe pain (Patient not taking: Reported on 6/22/2023) 6 tablet 0    sodium bicarbonate 650 MG tablet Take 1,950 mg by mouth 3 times daily Take 3 tablets " (1950 mg) by mouth every morning, every evening and every night at bedtime.      tamsulosin (FLOMAX) 0.4 MG capsule Take 0.4 mg by mouth daily (Patient not taking: Reported on 2023)      vitamin D3 (CHOLECALCIFEROL) 50 mcg (2000 units) tablet Take 1 tablet by mouth daily (Patient not taking: Reported on 10/5/2023)         Social History     Tobacco Use    Smoking status: Never    Smokeless tobacco: Never   Substance Use Topics    Alcohol use: Yes     Comment: 5/10/23-One drink per week    Drug use: Never       Invasive Procedure Safety Checklist:    Procedure: Urodynamics    Action: Complete sections and checkboxes as appropriate.    Pre-procedure:    1. Patient ID Verified with 2 identifiers (Patience and  or MRN) : YES    2. Procedure and site verified with patient/designee (when able) : YES    3. Accurate consent documentation in medical record : YES    4. H&P (or appropriate assessment) documented in medical record : N/A    H&P must be up to 30 days prior to procedure an updated within 24 hours of Procedure as applicable.     5. Relevant diagnostic and radiology test results appropriately labeled and displayed as applicable : YES    6. Blood products, implants, devices, and/or special equipment available for the procedure as applicable : YES    7. Procedure site(s) marked with provider initials [Exclusions: none] : NO    8. Marking not required. Reason : Yes  Procedure does not require site marking    Time Out:     Time-Out performed immediately prior to starting procedure, including verbal and active participation of all team members addressing: YES    1. Correct patient identity.  2. Confirmed that the correct side and site are marked.  3. An accurate procedure to be done.  4. Agreement on the procedure to be done.  5. Correct patient position.  6. Relevant images and results are properly labeled and appropriately displayed.  7. The need to administer antibiotics or fluids for irrigation purposes during  the procedure as applicable.  8. Safety precautions based on patient history or medication use.    During Procedure: Verification of correct person, site, and procedure occurs any time the responsibility for care of the patient is transferred to another member of the care team.          The following medication was given:     MEDICATION:  Keflex (Cefalexin)  ROUTE: PO  SITE: Medication was given orally  DOSE: 500mg  LOT #: [id number]  : [employer]   EXPIRATION DATE: 11/2024  NDC#:  [id number]   Was there drug waste? No      The following medication was given:       MEDICATION:  Isovue-250  ROUTE:  Provider Administered  SITE: Provider Administered  DOSE: 100 mL  LOT #: [id number]  : [employer]  EXPIRATION DATE: 03/2026  NDC#: 3887-0957-10  Was there drug waste? No        Smiley Nash CMA  10/5/2023  7:12 AM

## 2023-10-09 ENCOUNTER — VIRTUAL VISIT (OUTPATIENT)
Dept: UROLOGY | Facility: CLINIC | Age: 33
End: 2023-10-09
Payer: COMMERCIAL

## 2023-10-09 DIAGNOSIS — Z01.818 PRE-TRANSPLANT EVALUATION FOR KIDNEY TRANSPLANT: ICD-10-CM

## 2023-10-09 DIAGNOSIS — N30.80 POLYPOID CYSTITIS: Primary | ICD-10-CM

## 2023-10-09 PROCEDURE — 99215 OFFICE O/P EST HI 40 MIN: CPT | Mod: VID | Performed by: UROLOGY

## 2023-10-09 NOTE — PROGRESS NOTES
HPI:  Ana Ch is a 33 year old male being seen for bladder assessment before being list for renal transplant.    March 2020 first saw nephrology for asymptomatic CKD. Started HD 3/2023. US in 2020 reviewed by me and shows no hydro, just atrophic kidneys.   Nov 2022 started having bladder symptoms. Bladder pain at the end of urination. No GH. Several urine cultures, all neg. Cystscopy by Dr Corcoran showing bullous edema of the right bladdder wall.   CT in early 2023 with minimal hydro. Renal atrophy is more predominant.  Reviewed previous notes from Dr. Corcoran from urology service at [facility]    Exam:  There were no vitals taken for this visit.  GENERAL: Healthy, alert and no distress  EYES: Eyes grossly normal to inspection.  No discharge or erythema, or obvious scleral/conjunctival abnormalities.  RESP: No audible wheeze, cough, or visible cyanosis.  No visible retractions or increased work of breathing.    SKIN: Visible skin clear. No significant rash, abnormal pigmentation or lesions.  NEURO: Cranial nerves grossly intact.  Mentation and speech appropriate for age.  PSYCH: Mentation appears normal, affect normal/bright, judgement and insight intact, normal speech and appearance well-groomed.    Review of Imaging:  The following imaging exams were independently viewed and interpreted by me and discussed with patient:  Cystogram during UDS 10/2023 Abnormal: small bladder, no reflux. Also reviewed RPG with Dr. Corcoran showing moderate to severe left hydro.     Review of Labs:  The following labs were reviewed by me and discussed with the patient:  Recent Results (from the past 720 hour(s))   UA Macroscopic with reflex to Microscopic and Culture    Collection Time: 09/29/23  4:25 PM    Specimen: Urine, Midstream   Result Value Ref Range    Color Urine Yellow Colorless, Straw, Light Yellow, Yellow    Appearance Urine Clear Clear    Glucose Urine Negative Negative mg/dL    Bilirubin Urine Negative Negative     Ketones Urine Trace (A) Negative mg/dL    Specific Gravity Urine 1.020 1.003 - 1.035    Blood Urine Negative Negative    pH Urine 6.0 5.0 - 7.0    Protein Albumin Urine >=300 (A) Negative mg/dL    Urobilinogen Urine 0.2 0.2, 1.0 E.U./dL    Nitrite Urine Negative Negative    Leukocyte Esterase Urine Negative Negative   Urine Culture Aerobic Bacterial    Collection Time: 09/29/23  4:25 PM    Specimen: Urine, Midstream   Result Value Ref Range    Culture 10,000-50,000 CFU/mL Mixture of urogenital emir    UA Microscopic with Reflex to Culture    Collection Time: 09/29/23  4:25 PM   Result Value Ref Range    RBC Urine None Seen 0-2 /HPF /HPF    WBC Urine 0-5 0-5 /HPF /HPF    Granular Casts Urine 0-2 (A) None Seen /LPF         Assessment & Plan   1. Cystitis with h/o bladder pain  2. Small bladder capacity but normal compliance  3. ESRD  4. He is cleared for renal transplant from a urology perspective. His bladder problems occurred years after his onset of CKD. Further, is his CKD in 2020 was associated with no hydronephrosis.  5. He should be referred back to urology for repeat urodynamics 3 months after transplant.      Aldair Wynn MD  [facility] [place]      ==========================      Additional Coding Information:    Time spent:  40 minutes spent by me on the date of the encounter doing chart review, history and exam, documentation and further activities per the note        Virtual Visit Details    Type of service:  Video Visit     Originating Location (pt. Location): Home  Distant Location (provider location):  Off-site  Platform used for Video Visit: [employer]   Video time: 7:15-7:40

## 2023-10-09 NOTE — NURSING NOTE
Is the patient currently in the state of [place]? YES    Visit mode:VIDEO    If the visit is dropped, the patient can be reconnected by: VIDEO VISIT: Send to e-mail at: [email or web address]    Will anyone else be joining the visit? NO  (If patient encounters technical issues they should call [phone]    How would you like to obtain your AVS? MyChart    Are changes needed to the allergy or medication list? No    Reason for visit: Follow Up    Talia GREENFIELD

## 2023-10-09 NOTE — LETTER
10/9/2023       RE: Ana Ch  [address]  [place] [address]     Dear Colleague,    Thank you for referring your patient, Ana Ch, to the [facility] [place] at [facility]. Please see a copy of my visit note below.    HPI:  Ana Ch is a 33 year old male being seen for bladder assessment before being list for renal transplant.    March 2020 first saw nephrology for asymptomatic CKD. Started HD 3/2023. US in 2020 reviewed by me and shows no hydro, just atrophic kidneys.   Nov 2022 started having bladder symptoms. Bladder pain at the end of urination. No GH. Several urine cultures, all neg. Cystscopy by Dr Corcoran showing bullous edema of the right bladdder wall.   CT in early 2023 with minimal hydro. Renal atrophy is more predominant.  Reviewed previous notes from Dr. Corcoran from urology service at [facility]    Exam:  There were no vitals taken for this visit.  GENERAL: Healthy, alert and no distress  EYES: Eyes grossly normal to inspection.  No discharge or erythema, or obvious scleral/conjunctival abnormalities.  RESP: No audible wheeze, cough, or visible cyanosis.  No visible retractions or increased work of breathing.    SKIN: Visible skin clear. No significant rash, abnormal pigmentation or lesions.  NEURO: Cranial nerves grossly intact.  Mentation and speech appropriate for age.  PSYCH: Mentation appears normal, affect normal/bright, judgement and insight intact, normal speech and appearance well-groomed.    Review of Imaging:  The following imaging exams were independently viewed and interpreted by me and discussed with patient:  Cystogram during UDS 10/2023 Abnormal: small bladder, no reflux. Also reviewed RPG with Dr. Corcoran showing moderate to severe left hydro.     Review of Labs:  The following labs were reviewed by me and discussed with the patient:  Recent Results (from the past 720 hour(s))    UA Macroscopic with reflex to Microscopic and Culture    Collection Time: 09/29/23  4:25 PM    Specimen: Urine, Midstream   Result Value Ref Range    Color Urine Yellow Colorless, Straw, Light Yellow, Yellow    Appearance Urine Clear Clear    Glucose Urine Negative Negative mg/dL    Bilirubin Urine Negative Negative    Ketones Urine Trace (A) Negative mg/dL    Specific Gravity Urine 1.020 1.003 - 1.035    Blood Urine Negative Negative    pH Urine 6.0 5.0 - 7.0    Protein Albumin Urine >=300 (A) Negative mg/dL    Urobilinogen Urine 0.2 0.2, 1.0 E.U./dL    Nitrite Urine Negative Negative    Leukocyte Esterase Urine Negative Negative   Urine Culture Aerobic Bacterial    Collection Time: 09/29/23  4:25 PM    Specimen: Urine, Midstream   Result Value Ref Range    Culture 10,000-50,000 CFU/mL Mixture of urogenital emir    UA Microscopic with Reflex to Culture    Collection Time: 09/29/23  4:25 PM   Result Value Ref Range    RBC Urine None Seen 0-2 /HPF /HPF    WBC Urine 0-5 0-5 /HPF /HPF    Granular Casts Urine 0-2 (A) None Seen /LPF         Assessment & Plan   Cystitis with h/o bladder pain  Small bladder capacity but normal compliance  ESRD  He is cleared for renal transplant from a urology perspective. His bladder problems occurred years after his onset of CKD. Further, is his CKD in 2020 was associated with no hydronephrosis.  He should be referred back to urology for repeat urodynamics 3 months after transplant.      Aldair Wynn MD  [facility] [place]      ==========================      Additional Coding Information:    Time spent:  40 minutes spent by me on the date of the encounter doing chart review, history and exam, documentation and further activities per the note        Virtual Visit Details    Type of service:  Video Visit     Originating Location (pt. Location): Home  Distant Location (provider location):  Off-site  Platform used for Video Visit: [employer]   Video time:  7:15-7:40

## 2024-01-04 ENCOUNTER — TELEPHONE (OUTPATIENT)
Dept: TRANSPLANT | Facility: CLINIC | Age: 34
End: 2024-01-04
Payer: COMMERCIAL

## 2024-01-04 NOTE — TELEPHONE ENCOUNTER
Called pt to touch base regarding transplant evaluation status.  He has not yet scheduled his follow up EUS w/ [facility], I asked him to please let me know when he does so that I can request records. He is still working on weight loss- once he reaches his goal of 225 lbs, we will have him return to see a surgeon and get a CT done.  He would like me to resend the pt education videos.  He had no further questions.

## 2024-03-14 ENCOUNTER — TELEPHONE (OUTPATIENT)
Dept: TRANSPLANT | Facility: CLINIC | Age: 34
End: 2024-03-14
Payer: COMMERCIAL

## 2024-03-14 NOTE — TELEPHONE ENCOUNTER
Please call  or email CITLALLI Anderson  at [facility]..  PH# [phone], email:  [email or web address]    CITLALLI Anderson is following up on Ana to see if he has transferred his wait time to our center.      Monica also mentioned Ana needed to loose weight.

## 2024-03-14 NOTE — TELEPHONE ENCOUNTER
Returned call to Monica but was only able to leave a voice message. Ana has not transferred his wait time to our center yet. He was listed inactive prior to initiation of dialysis at [facility] and this was discussed with his coordinator at  [facility]. He will remain inactive there until he is ready to be listed active here and the transfer of wait time will happen then. He is also working with weight management to lose 30 lbs. Provided Monica with my call back number.     Monica returned my call and Ana has been listed there inactive for 3+ years. They are now instituting a new policy that patients cannot remain listed inactive greater than one year especially if they are not going to be transplanted at [facility]. Ana needs weight loss and his dry weight from dialysis is 131.5 kg. He has actually gained weight since his evaluation and has not seen our weight management since July 2023. Message sent to coordinator to discuss with [facility] coordinator.

## 2024-03-22 NOTE — ANESTHESIA CARE TRANSFER NOTE
03/22/24 0618   Vent Information   $ RT Standby Charge (per 15 min) 1   Ventilation Day(s) 1   Interface Invasive   Vent Type PB   Vent plugged into main power? Yes   Vent Mode VC+   Settings   FiO2 (%) 35 %   Resp Rate (Set) 16   Vt (Set, mL) 400 mL   Waveform Decelerating ramp   PEEP/CPAP (cm H2O) 5 cm H20   Insp Time (sec) 0.9 sec   Insp Rise Time (%) 60 %   Trigger Sensitivity Flow (L/min) 3 L/min   H2O Bag Level 3/4 Full   Heater Temperature 98.6 °F (37 °C)   Readings   Total RR 16   Minute Ventilation (L/min) 6.4 L/min   Inspiratory Tidal Volume 400 mL   Expiratory Tidal Volume 383 mL   PIP Observed (cm H2O) 17 cm H2O   MAP (cm H2O) 8.1   I/E Ratio 1:3.2   Plateau Pressure (cm H2O) 15 cm H2O   Static Compliance (L/cm H2O) 40   Alarms   High RR 40   Insp Pressure High (cm H2O) 40 cm H2O   Insp Pressure Low (cm H2O) 11 cm H2O   MV High (L/min) 20 L/min   MV Low (L/min) 4 L/min   Apnea Interval (sec) 20 seconds   Apnea Rate 16   Apnea Volume (mL) 400 mL   ETT   Placement Date/Time: 03/21/24 (c) 2034   Airway Size: 7.5 mm  Cuffed: Cuffed  Insertion attempts: 1  Technique: Direct laryngoscopy  Placement Verification: Capnometry  Placed By: Anesthesiologist   Secured at (cm) 21 cm   Suctioned? N   Measured From Lips   Secured Location Right   Secured by Commercial tube drew   Site Condition Dry   Req'd equipment at bedside Bag mask        03/22/24 0618   Vent Information   $ RT Standby Charge (per 15 min) 1   Ventilation Day(s) 1   Interface Invasive   Vent Type PB   Vent plugged into main power? 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    Anesthesia Type:   MAC     Note:    Oropharynx: oropharynx clear of all foreign objects and spontaneously breathing  Level of Consciousness: awake      Independent Airway: airway patency satisfactory and stable  Dentition: dentition unchanged  Vital Signs Stable: post-procedure vital signs reviewed and stable  Report to RN Given: handoff report given  Patient transferred to: Phase II    Handoff Report: Identifed the Patient, Identified the Reponsible Provider, Reviewed the pertinent medical history, Discussed the surgical course, Reviewed Intra-OP anesthesia mangement and issues during anesthesia, Set expectations for post-procedure period and Allowed opportunity for questions and acknowledgement of understanding      Vitals:  Vitals Value Taken Time   BP     Temp     Pulse     Resp     SpO2         Electronically Signed By: BRITNEY Gregory CRNA  November 17, 2021  12:46 PM   Volume 383 mL   PIP Observed (cm H2O) 17 cm H2O   MAP (cm H2O) 8.1   I/E Ratio 1:3.2   Plateau Pressure (cm H2O) 15 cm H2O   Static Compliance (L/cm H2O) 40   Alarms   High RR 40   Insp Pressure High (cm H2O) 40 cm H2O   Insp Pressure Low (cm H2O) 11 cm H2O   MV High (L/min) 20 L/min   MV Low (L/min) 4 L/min   Apnea Interval (sec) 20 seconds   Apnea Rate 16   Apnea Volume (mL) 400 mL   ETT   Placement Date/Time: 03/21/24 (c) 2034   Airway Size: 7.5 mm  Cuffed: Cuffed  Insertion attempts: 1  Technique: Direct laryngoscopy  Placement Verification: Capnometry  Placed By: Anesthesiologist   Secured at (cm) 21 cm   Suctioned? N   Measured From Lips   Secured Location Right   Secured by Commercial tube drew   Site Condition Dry   Req'd equipment at bedside Bag mask

## 2024-03-27 ENCOUNTER — COMMITTEE REVIEW (OUTPATIENT)
Dept: TRANSPLANT | Facility: CLINIC | Age: 34
End: 2024-03-27
Payer: COMMERCIAL

## 2024-03-27 ENCOUNTER — TELEPHONE (OUTPATIENT)
Dept: TRANSPLANT | Facility: CLINIC | Age: 34
End: 2024-03-27
Payer: COMMERCIAL

## 2024-03-27 NOTE — COMMITTEE REVIEW
Abdominal Patient Discussion Note Transplant Coordinator: Kaycee Burr  Transplant Surgeon: Dr. Austin Centeno    Referring Physician: Referred Self    Committee Review Members:  Nephrology Herrera Marmolejo MD   Nutrition Nadine Sandy, AUDI   Pharmacist Chantel Jennings, [facility]    - Clinical Chantale Toussaint, [facility], Helga Wei, [facility]   Transplant GERRI MILLIGAN, RN, Felicity Laguerre, RN, Miracle Allen, RN, Madhavi Rowell, RN, Jermaine Machado MD, Kaycee Burr, RN, Helga Ramirez, RN, Sara Goldman, NP, Sara Alves, RN, Chato Bermeo, RN, Casi Dacosta, CITLALLI, Fatimah Perla RN       Additional Discussion Notes and Findings: Reviewed with the Committee that the pt has pre-dialysis wait time as of 3/30/2021 from [facility] and remains listed inactive at their center.  He will not be candidate at their center d/t his urologic hx.  He was seen by Dr. Wynn at [facility] 10/9/23 who does not find any urologic contraindications in proceeding with renal transplant. The pt has significant weight loss before he will be approved for active listing status, thus, the Committee recommends he establish with the [facility] and pursue bariatric surgery.  Although the pt is on dialysis, the Committee approves listing the pt inactive status on the kidney wait list so that his wait time can get transferred from [facility] and removed from their list as they will not be moving forward with his case. Will initiate inactive listing and wait time transfer. Pt will be called and summary letter will be sent.

## 2024-03-27 NOTE — TELEPHONE ENCOUNTER
Called STU Chilel at [facility] to discuss potential need to transfer wait time. Pt has been listed with them since 3/30/2021, they are working to clean up their wait list.  Will propose listing inactive here to Committee to facilitate wait time transfer.

## 2024-04-01 ENCOUNTER — TELEPHONE (OUTPATIENT)
Dept: TRANSPLANT | Facility: CLINIC | Age: 34
End: 2024-04-01
Payer: COMMERCIAL

## 2024-04-01 NOTE — LETTER
2024    Ana Ch  [address]  [place] [address]      Dear Mr. Ch,    This letter is sent to confirm that you are a candidate in the kidney transplant program at the [facility].  You were placed on the kidney inactive waitlist on 24, we will work with your coordinator at [facility] to get your pre-dialysis wait time transferred to our center.  While you are listed inactive status, you will accumulate waiting time but not receive  donor calls.       Items we will need from you:    We have received approval from you insurance company for the transplant procedure.  It is critical that you notify us if there is any change in your insurance.  It is also important that you familiarize yourself with the details of your specific insurance policy.  Our patient  is available to assist you if you should have any questions regarding your coverage.    Once you are listed active status, an ALA or PRA blood sample will need to be sent here every 3 months to match you with  donors or any potential living donors.  At that time, special mailing boxes (called mailers) will be sent to you directly from the Outreach Department.  You should take the physician order form and the  to your home laboratory when it is time to for this testing to be done.  Additional mailers can be obtained by calling the Transplant Office and asking to speak to a kidney .    During this waiting period, we may request additional periodic laboratory tests with your primary physician.  It will be your responsibility to remind your physician to forward your results to the Transplant Office.    We need to be kept informed of any changes in your medical condition such as:    changes in your medications,   significant changes in your health  significant infections (such as pneumonia or abscesses)  blood  transfusions  any condition which requires hospitalization  any surgery    Remember to complete any routine cancer screening tests required before your transplant.  This includes colonoscopy; prostrate screening for men, and mammogram and gynecologic testing for women, as well as dental work.  Your primary care clinic can assist you with arranging for these exams.  Remind your caregivers to forward copies of the records and final reports.    We want you to know that our program has physician and surgeon coverage 24 hours a day, 365 days a year. In addition, our transplant surgeons and physicians will not be on call for two or more transplant programs more than 30 miles apart unless the circumstances have been reviewed and approved by the [employer] for Organ Sharing (UNOS) Membership and Professional Standards Committee (MPSC). Finally, our primary physician and primary surgeons are not designated as the primary surgeon or primary physician at more than 1 transplant hospital. If this coverage changes or there are substantial program changes, you will be notified in writing by letter.     Attached is a letter from UN that describes the services and information offered to patients by UN and the Organ Procurement and Transplantation Network (OPTN).    We appreciate having had the opportunity to participate in your care.  If you have questions, please feel free to call the Transplant Office at [phone] or [phone].      Sincerely,   Kaycee Burr RN, Pre-Kidney/Pancreas Transplant Coordinator   Kidney Transplant Program    Enclosures: [facility] Letter  CC:   Care Team                         The Organ Procurement and Transplantation Network Toll-free patient services line:  [phone]  Your resource for organ transplant information    Staffed 8:30 am - 5:00 pm ET Monday - Friday Leave a message 24/7 to receive a call back    The Organ Procurement and Transplantation Network (OPTN) is the national  transplant system. It makes the policies that decide how donated organs are matched to patients waiting for a transplant. The OPTN:    Makes sure donated organs get matched to people on the transplant waiting list  Tells people about the donation and transplant processes  Makes sure that the public knows about the need for more organ and tissue donations    The OPTN has a free patient services line that you can call to:  Get more information about:  Organ donation and organ transplants  Donation and transplant policies  Get an information kit with:  A list of transplant hospitals  Waiting list information  Talk about any questions you may have about your transplant hospital or organ procurement organization. The staff will do their best to help you or point you to others who may help.  Find out how you can volunteer with the OPTN and help shape transplant policy     The patient services line number is: 6-163-811-6690    Patient services line staff CANNOT answer questions about your own medical care, including:  Waiting list status  Test results  Medical records  You will need to call your transplant hospital for this information.    The following websites have more information about transplantation and donation:    OPTN: https://optn.transplant.[facility].gov/    For potential living donors and transplant recipients:    Living with transplant: https://www.transplantliving.org/    Living donation process: https://optn.transplant.hrsa.gov/living-donation/    Financial assistance: https://www.livingdonorassistance.org/    Transplantation data: https://www.srtr.org/    Organ donation: https://www.organdonor.gov/    Volunteer with the OPTN: https://optn.transplant.hrsa.gov/get-involved/

## 2024-04-01 NOTE — TELEPHONE ENCOUNTER
Attempted to reach pt to discuss plan to list inactive status at [facility] and transfer wait time from [facility] to our center. Mail box was full. Pt has been listed inactive status at this time for that purpose. I will email the Wait Time Transfer Form for him to sign and return. Inactive listing letter sent.     Called CITLALLI AndersonCC at [facility] and left  explaining that we have listed the pt inactive status to transfer his wait time here. Provided my direct line for any follow up questions.

## 2024-04-02 NOTE — TELEPHONE ENCOUNTER
Pt returned my call, he will sign the Wait Time Transfer Form and will return it to me via email - he did not have any questions on the process for transferring wait time. We discussed his remaining evaluation items- he is working w/ his PCP on weight loss and wants to continue with them, he states he has lost 15 lbs so far. He will let me know when he is about 225 lbs, and we will have him return to see a surgeon and get an updated CT scan. He wants to wait to take the class until he is closer to his weight loss goal.  He states he is seeing [facility] this summer- we will request those records once he is seen. He had no further questions at this time. I will submit his Wait Time Transfer form to [facility] once I have received his signed copy.

## 2024-04-03 ENCOUNTER — DOCUMENTATION ONLY (OUTPATIENT)
Dept: TRANSPLANT | Facility: CLINIC | Age: 34
End: 2024-04-03
Payer: COMMERCIAL

## 2024-04-03 ENCOUNTER — MEDICAL CORRESPONDENCE (OUTPATIENT)
Dept: HEALTH INFORMATION MANAGEMENT | Facility: CLINIC | Age: 34
End: 2024-04-03

## 2024-04-03 NOTE — PROGRESS NOTES
Received signed Wait Time Transfer Form from pt. Faxed to [facility]: [phone].

## 2024-04-09 ENCOUNTER — TELEPHONE (OUTPATIENT)
Dept: TRANSPLANT | Facility: CLINIC | Age: 34
End: 2024-04-09
Payer: COMMERCIAL

## 2024-04-09 NOTE — TELEPHONE ENCOUNTER
Called pt to let him know his wait time transfer with [facility] is complete and his wait time here now dates back to 3/30/2021 (updated in Epic as well). I explained that he will now be removed from the wait list at [facility] and will receive communication from them. He had no further questions at this time.    Called Monica, RNCC at [place], left VM explaining that the pt's pre-dialysis wait time has been transferred to our center and I have received confirmation from [facility]. Provided direct line if she has any further questions.

## 2024-06-08 ENCOUNTER — HEALTH MAINTENANCE LETTER (OUTPATIENT)
Age: 34
End: 2024-06-08

## 2024-06-13 ENCOUNTER — TELEPHONE (OUTPATIENT)
Dept: TRANSPLANT | Facility: CLINIC | Age: 34
End: 2024-06-13
Payer: COMMERCIAL

## 2024-06-13 NOTE — TELEPHONE ENCOUNTER
Called pt to touch base on pre kidney transplant evaluation status. He states he is currently weighing in at 259 lbs.  He is working with his PCP on weight management. He wants to hold off on class and other testing until he is at his weight loss goal. He had no further questions at this time.

## 2024-10-29 ENCOUNTER — TELEPHONE (OUTPATIENT)
Dept: TRANSPLANT | Facility: CLINIC | Age: 34
End: 2024-10-29
Payer: COMMERCIAL

## 2024-10-29 NOTE — TELEPHONE ENCOUNTER
Called pt to touch base on pre-kidney transplant evaluation status. Left VM requesting return call to direct line.

## 2024-11-04 ENCOUNTER — OFFICE VISIT (OUTPATIENT)
Dept: URGENT CARE | Facility: URGENT CARE | Age: 34
End: 2024-11-04
Payer: COMMERCIAL

## 2024-11-04 ENCOUNTER — MYC MEDICAL ADVICE (OUTPATIENT)
Dept: INTERNAL MEDICINE | Facility: CLINIC | Age: 34
End: 2024-11-04

## 2024-11-04 VITALS
RESPIRATION RATE: 18 BRPM | HEART RATE: 95 BPM | DIASTOLIC BLOOD PRESSURE: 76 MMHG | SYSTOLIC BLOOD PRESSURE: 112 MMHG | OXYGEN SATURATION: 100 % | TEMPERATURE: 97 F

## 2024-11-04 DIAGNOSIS — L02.31 ABSCESS OF BUTTOCK: Primary | ICD-10-CM

## 2024-11-04 PROCEDURE — 99213 OFFICE O/P EST LOW 20 MIN: CPT | Performed by: PHYSICIAN ASSISTANT

## 2024-11-04 RX ORDER — LEVOFLOXACIN 500 MG/1
1 TABLET, FILM COATED ORAL DAILY
COMMUNITY

## 2024-11-04 RX ORDER — SULFAMETHOXAZOLE AND TRIMETHOPRIM 800; 160 MG/1; MG/1
1 TABLET ORAL 2 TIMES DAILY
Qty: 14 TABLET | Refills: 0 | Status: SHIPPED | OUTPATIENT
Start: 2024-11-04 | End: 2024-11-11

## 2024-11-04 RX ORDER — EMTRICITABINE AND TENOFOVIR ALAFENAMIDE 200; 25 MG/1; MG/1
1 TABLET ORAL
COMMUNITY
Start: 2024-05-03

## 2024-11-05 NOTE — TELEPHONE ENCOUNTER
Medication Question or Refill        What medication are you calling about (include dose and sig)?: Generic bactrim     Preferred Pharmacy:       [id number] - [place], [place] - [address] AT [facility] [address]  [place] [address]  Phone: [phone] Fax: [phone]              Controlled Substance Agreement on file:   CSA -- Patient Level:    CSA: None found at the patient level.       Who prescribed the medication?: Vinod Moralez    Do you need a refill?  No    When did you use the medication last? no    Patient offered an appointment? No     Do you have any questions or concerns?  Yes: He has kidney failure and the pharmacy said this is not good for him. Dr Moralez said there is a 2nd med if the 1st one didn't work      Could we send this information to you in [facility] or would you prefer to receive a phone call?:   Patient would prefer a phone call   Okay to leave a detailed message?: Yes at Home number on file [phone] (home)

## 2024-12-02 ENCOUNTER — TELEPHONE (OUTPATIENT)
Dept: TRANSPLANT | Facility: CLINIC | Age: 34
End: 2024-12-02
Payer: COMMERCIAL

## 2024-12-02 ENCOUNTER — DOCUMENTATION ONLY (OUTPATIENT)
Dept: TRANSPLANT | Facility: CLINIC | Age: 34
End: 2024-12-02
Payer: COMMERCIAL

## 2024-12-02 DIAGNOSIS — Z01.818 PRE-TRANSPLANT EVALUATION FOR KIDNEY TRANSPLANT: Primary | ICD-10-CM

## 2024-12-02 DIAGNOSIS — N05.1 FSGS (FOCAL SEGMENTAL GLOMERULOSCLEROSIS): ICD-10-CM

## 2024-12-02 DIAGNOSIS — Z76.82 ORGAN TRANSPLANT CANDIDATE: ICD-10-CM

## 2024-12-02 DIAGNOSIS — N18.6 END STAGE RENAL DISEASE (H): ICD-10-CM

## 2024-12-02 DIAGNOSIS — I10 ESSENTIAL HYPERTENSION: ICD-10-CM

## 2024-12-02 NOTE — PROGRESS NOTES
Kidney Transplant Waitlist - Qualified: 3/30/2021 - Inactive  Ana Ch attended the pre-transplant patient education class today. The [facility] website pre-transplant modules were viewed; class participants were educated on using the site.     Content reviewed:  Living Donation and how to access that program  Paired exchange  Kidney Donor Profile Index (KDPI)  Waiting list issues (right to decline without penalty, high PHS risk donors, what to expect when called with an offer)  Hospital experience, length of stay, need to stay locally post-discharge (2-4 weeks)                         Post-surgery lifting and driving restrictions  Post-transplant routines, frequency of lab work and clinic visits  Need to stay locally post-discharge (2-4 weeks)  Role of Transplant Coordinator    Participants were informed of the benefits of transplant as well as potential risks such as infection, cancer, and death.  The need for total adherence with immunosuppression medications and following transplant regimens was stressed.  The overall evaluation/approval/listing process was reviewed.

## 2024-12-02 NOTE — TELEPHONE ENCOUNTER
Pt called to let me know he completed the online transplant pt education - we reviewed that information together (see separate encounter).  He has potential living donors - I have emailed him the living donor registration link and he will forward it along. He is now at 230 lbs - his goal weight is 225 lbs. We will place an order for a follow up visit w/ a transplant surgeon as well as an Ab/pelv CT. I let him know he will now transition to [facility] coordinator, CITLALLI Partida. I provided him w/ Helga's contact info. Hand off to [facility].

## 2024-12-03 ENCOUNTER — TELEPHONE (OUTPATIENT)
Dept: TRANSPLANT | Facility: CLINIC | Age: 34
End: 2024-12-03
Payer: COMMERCIAL

## 2024-12-03 ENCOUNTER — DOCUMENTATION ONLY (OUTPATIENT)
Dept: TRANSPLANT | Facility: CLINIC | Age: 34
End: 2024-12-03
Payer: COMMERCIAL

## 2024-12-16 ENCOUNTER — OFFICE VISIT (OUTPATIENT)
Dept: TRANSPLANT | Facility: CLINIC | Age: 34
End: 2024-12-16
Attending: NURSE PRACTITIONER
Payer: COMMERCIAL

## 2024-12-16 ENCOUNTER — ANCILLARY PROCEDURE (OUTPATIENT)
Dept: CT IMAGING | Facility: CLINIC | Age: 34
End: 2024-12-16
Attending: NURSE PRACTITIONER
Payer: COMMERCIAL

## 2024-12-16 ENCOUNTER — LAB (OUTPATIENT)
Dept: LAB | Facility: CLINIC | Age: 34
End: 2024-12-16
Attending: NURSE PRACTITIONER
Payer: COMMERCIAL

## 2024-12-16 DIAGNOSIS — N18.6 END STAGE RENAL DISEASE (H): ICD-10-CM

## 2024-12-16 DIAGNOSIS — I10 ESSENTIAL HYPERTENSION: ICD-10-CM

## 2024-12-16 DIAGNOSIS — N05.1 FSGS (FOCAL SEGMENTAL GLOMERULOSCLEROSIS): ICD-10-CM

## 2024-12-16 DIAGNOSIS — Z01.818 PRE-TRANSPLANT EVALUATION FOR KIDNEY TRANSPLANT: ICD-10-CM

## 2024-12-16 DIAGNOSIS — Z76.82 ORGAN TRANSPLANT CANDIDATE: ICD-10-CM

## 2024-12-16 PROCEDURE — 36415 COLL VENOUS BLD VENIPUNCTURE: CPT | Performed by: PATHOLOGY

## 2024-12-16 PROCEDURE — 74176 CT ABD & PELVIS W/O CONTRAST: CPT | Performed by: STUDENT IN AN ORGANIZED HEALTH CARE EDUCATION/TRAINING PROGRAM

## 2024-12-16 PROCEDURE — 99213 OFFICE O/P EST LOW 20 MIN: CPT | Performed by: SURGERY

## 2024-12-16 RX ORDER — PROPRANOLOL HCL 20 MG
20-40 TABLET ORAL AT BEDTIME
COMMUNITY

## 2024-12-16 RX ORDER — IBUPROFEN 200 MG
50 TABLET ORAL
COMMUNITY

## 2024-12-16 NOTE — LETTER
12/16/2024      Ana Ch  [address]  [place] [address]      Dear Colleague,    Thank you for referring your patient, Ana Ch, to the [facility]. Please see a copy of my visit note below.    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 Wynn in October 2023 - small bladder but normal compliance  Has live donors - old neighbor    Blood type O  PRA 6   Has 3 Y 8 months wait time  ECHO 2023 - normal  CT A/P - normal vessels    Plan:  - Will discuss at waitlist meeting this week  - Needs to get live donor (neighbor) in for workup here at [facility] (were worked up previously at [facility]).  - Cleared from a BMI standpoint   - Cleared by urology    Wilder Crowe MD  Transplant Surgery Fellow              Again, thank you for allowing me to participate in the care of your patient.        Sincerely,        Austin Centeno MD

## 2024-12-16 NOTE — PROGRESS NOTES
Kidney Transplant Progress Note    35 yo with FSGS on dialysis since Mar 23  PD cath scar   No transfusions  Obese BMI 39.2, has lost 70 lbs with GLP-1 inhibitor  Currently 233 with medical weight loss  Urologic issues include small bladder with thickening and scarring, and polyps  Unclear etiology, was worked up in [facility], cleared by urology here at [facility] with no further workup by Dr. Wynn in October 2023 - small bladder but normal compliance  Has live donors - old neighbor    Blood type O  PRA 6   Has 3 Y 8 months wait time  ECHO 2023 - normal  CT A/P - normal vessels    Plan:  - Will discuss at waitlist meeting this week  - Needs to get live donor (neighbor) in for workup here at [facility] (were worked up previously at [facility]).  - Cleared from a BMI standpoint   - Cleared by urology    Wilder Crowe MD  Transplant Surgery Fellow

## 2024-12-17 ENCOUNTER — TELEPHONE (OUTPATIENT)
Dept: TRANSPLANT | Facility: CLINIC | Age: 34
End: 2024-12-17
Payer: COMMERCIAL

## 2024-12-17 NOTE — TELEPHONE ENCOUNTER
Called pt to touch base after surgeon visit. Pt provided donor website to potential donor. Reviewed donor and recipient work up is kept separate but will be able to update pt once a donor is approved. Pt requested email with contact info, sent. Pt still needs EUS with [facility], pt will notify coordinator once arranged. Pt verbalized understanding of information and has no further questions. Encouraged to reach out if questions arise.

## 2025-01-12 LAB
PROTOCOL CUTOFF: NORMAL
SA 1  COMMENTS: NORMAL
SA 1 CELL: NORMAL
SA 1 TEST METHOD: NORMAL
SA 2 CELL: NORMAL
SA 2 COMMENTS: NORMAL
SA 2 TEST METHOD: NORMAL
SA1 HI RISK ABY: NORMAL
SA1 MOD RISK ABY: NORMAL
SA2 HI RISK ABY: NORMAL
SA2 MOD RISK ABY: NORMAL
UNACCEPTABLE ANTIGENS: NORMAL
UNOS CPRA: 6

## 2025-01-16 ENCOUNTER — TRANSFERRED RECORDS (OUTPATIENT)
Dept: MULTI SPECIALTY CLINIC | Facility: CLINIC | Age: 35
End: 2025-01-16
Payer: COMMERCIAL

## 2025-01-16 LAB
C TRACH DNA SPEC QL PROBE+SIG AMP: NEGATIVE
HEP C HIM: NORMAL
HIV 1&2 EXT: NORMAL
N GONORRHOEA DNA SPEC QL PROBE+SIG AMP: NEGATIVE
SPECIMEN DESCRIP: NORMAL
SPECIMEN DESCRIPTION: NORMAL

## 2025-02-04 ENCOUNTER — ANESTHESIA (OUTPATIENT)
Dept: GASTROENTEROLOGY | Facility: CLINIC | Age: 35
End: 2025-02-04
Payer: COMMERCIAL

## 2025-02-04 ENCOUNTER — ANESTHESIA EVENT (OUTPATIENT)
Dept: GASTROENTEROLOGY | Facility: CLINIC | Age: 35
End: 2025-02-04
Payer: COMMERCIAL

## 2025-02-04 ENCOUNTER — HOSPITAL ENCOUNTER (OUTPATIENT)
Facility: CLINIC | Age: 35
Discharge: HOME | End: 2025-02-04
Attending: INTERNAL MEDICINE | Admitting: INTERNAL MEDICINE
Payer: COMMERCIAL

## 2025-02-04 VITALS
HEIGHT: 68 IN | RESPIRATION RATE: 15 BRPM | OXYGEN SATURATION: 100 % | SYSTOLIC BLOOD PRESSURE: 114 MMHG | HEART RATE: 89 BPM | DIASTOLIC BLOOD PRESSURE: 92 MMHG | WEIGHT: 235 LBS | BODY MASS INDEX: 35.61 KG/M2

## 2025-02-04 LAB
POTASSIUM SERPL-SCNC: 4.6 MMOL/L (ref 3.4–5.3)
UPPER EUS: NORMAL

## 2025-02-04 PROCEDURE — 250N000009 HC RX 250: Performed by: ANESTHESIOLOGY

## 2025-02-04 PROCEDURE — 36415 COLL VENOUS BLD VENIPUNCTURE: CPT | Performed by: INTERNAL MEDICINE

## 2025-02-04 PROCEDURE — 370N000017 HC ANESTHESIA TECHNICAL FEE, PER MIN: Performed by: INTERNAL MEDICINE

## 2025-02-04 PROCEDURE — 999N000010 HC STATISTIC ANES STAT CODE-CRNA PER MINUTE: Performed by: INTERNAL MEDICINE

## 2025-02-04 PROCEDURE — 88305 TISSUE EXAM BY PATHOLOGIST: CPT | Mod: 26 | Performed by: PATHOLOGY

## 2025-02-04 PROCEDURE — 250N000011 HC RX IP 250 OP 636: Performed by: ANESTHESIOLOGY

## 2025-02-04 PROCEDURE — 88305 TISSUE EXAM BY PATHOLOGIST: CPT | Mod: TC | Performed by: INTERNAL MEDICINE

## 2025-02-04 PROCEDURE — 88342 IMHCHEM/IMCYTCHM 1ST ANTB: CPT | Mod: TC | Performed by: INTERNAL MEDICINE

## 2025-02-04 PROCEDURE — 84132 ASSAY OF SERUM POTASSIUM: CPT | Performed by: INTERNAL MEDICINE

## 2025-02-04 PROCEDURE — 43239 EGD BIOPSY SINGLE/MULTIPLE: CPT | Performed by: INTERNAL MEDICINE

## 2025-02-04 PROCEDURE — 43237 ENDOSCOPIC US EXAM ESOPH: CPT | Performed by: INTERNAL MEDICINE

## 2025-02-04 PROCEDURE — 258N000003 HC RX IP 258 OP 636: Performed by: ANESTHESIOLOGY

## 2025-02-04 PROCEDURE — 88342 IMHCHEM/IMCYTCHM 1ST ANTB: CPT | Mod: 26 | Performed by: PATHOLOGY

## 2025-02-04 RX ORDER — NALOXONE HYDROCHLORIDE 0.4 MG/ML
0.4 INJECTION, SOLUTION INTRAMUSCULAR; INTRAVENOUS; SUBCUTANEOUS
Status: DISCONTINUED | OUTPATIENT
Start: 2025-02-04 | End: 2025-02-04 | Stop reason: HOSPADM

## 2025-02-04 RX ORDER — ONDANSETRON 2 MG/ML
4 INJECTION INTRAMUSCULAR; INTRAVENOUS EVERY 6 HOURS PRN
Status: DISCONTINUED | OUTPATIENT
Start: 2025-02-04 | End: 2025-02-04 | Stop reason: HOSPADM

## 2025-02-04 RX ORDER — NALOXONE HYDROCHLORIDE 0.4 MG/ML
0.2 INJECTION, SOLUTION INTRAMUSCULAR; INTRAVENOUS; SUBCUTANEOUS
Status: DISCONTINUED | OUTPATIENT
Start: 2025-02-04 | End: 2025-02-04 | Stop reason: HOSPADM

## 2025-02-04 RX ORDER — PROPOFOL 10 MG/ML
INJECTION, EMULSION INTRAVENOUS PRN
Status: DISCONTINUED | OUTPATIENT
Start: 2025-02-04 | End: 2025-02-04

## 2025-02-04 RX ORDER — SODIUM CHLORIDE, SODIUM LACTATE, POTASSIUM CHLORIDE, CALCIUM CHLORIDE 600; 310; 30; 20 MG/100ML; MG/100ML; MG/100ML; MG/100ML
INJECTION, SOLUTION INTRAVENOUS CONTINUOUS PRN
Status: DISCONTINUED | OUTPATIENT
Start: 2025-02-04 | End: 2025-02-04

## 2025-02-04 RX ORDER — ONDANSETRON 4 MG/1
4 TABLET, ORALLY DISINTEGRATING ORAL EVERY 6 HOURS PRN
Status: DISCONTINUED | OUTPATIENT
Start: 2025-02-04 | End: 2025-02-04 | Stop reason: HOSPADM

## 2025-02-04 RX ORDER — LIDOCAINE HYDROCHLORIDE 20 MG/ML
INJECTION, SOLUTION INFILTRATION; PERINEURAL PRN
Status: DISCONTINUED | OUTPATIENT
Start: 2025-02-04 | End: 2025-02-04

## 2025-02-04 RX ORDER — PROCHLORPERAZINE MALEATE 10 MG
10 TABLET ORAL EVERY 6 HOURS PRN
Status: DISCONTINUED | OUTPATIENT
Start: 2025-02-04 | End: 2025-02-04 | Stop reason: HOSPADM

## 2025-02-04 RX ORDER — FLUMAZENIL 0.1 MG/ML
0.2 INJECTION, SOLUTION INTRAVENOUS
Status: DISCONTINUED | OUTPATIENT
Start: 2025-02-04 | End: 2025-02-04 | Stop reason: HOSPADM

## 2025-02-04 RX ORDER — LIDOCAINE 40 MG/G
CREAM TOPICAL
Status: DISCONTINUED | OUTPATIENT
Start: 2025-02-04 | End: 2025-02-04 | Stop reason: HOSPADM

## 2025-02-04 RX ORDER — SODIUM CHLORIDE 9 MG/ML
INJECTION, SOLUTION INTRAVENOUS CONTINUOUS PRN
Status: DISCONTINUED | OUTPATIENT
Start: 2025-02-04 | End: 2025-02-04

## 2025-02-04 RX ADMIN — SODIUM CHLORIDE: 9 INJECTION, SOLUTION INTRAVENOUS at 09:12

## 2025-02-04 RX ADMIN — PHENYLEPHRINE HYDROCHLORIDE 100 MCG: 10 INJECTION INTRAVENOUS at 09:34

## 2025-02-04 RX ADMIN — PROPOFOL 200 MCG/KG/MIN: 10 INJECTION, EMULSION INTRAVENOUS at 09:16

## 2025-02-04 RX ADMIN — TOPICAL ANESTHETIC 1 SPRAY: 200 SPRAY DENTAL; PERIODONTAL at 09:13

## 2025-02-04 RX ADMIN — LIDOCAINE HYDROCHLORIDE 50 MG: 20 INJECTION, SOLUTION INFILTRATION; PERINEURAL at 09:14

## 2025-02-04 RX ADMIN — PROPOFOL 100 MG: 10 INJECTION, EMULSION INTRAVENOUS at 09:15

## 2025-02-04 RX ADMIN — PROPOFOL 20 MG: 10 INJECTION, EMULSION INTRAVENOUS at 09:21

## 2025-02-04 RX ADMIN — PROPOFOL 100 MG: 10 INJECTION, EMULSION INTRAVENOUS at 09:17

## 2025-02-04 RX ADMIN — DEXMEDETOMIDINE HYDROCHLORIDE 12 MCG: 100 INJECTION, SOLUTION INTRAVENOUS at 09:14

## 2025-02-04 ASSESSMENT — ACTIVITIES OF DAILY LIVING (ADL)
ADLS_ACUITY_SCORE: 41

## 2025-02-04 ASSESSMENT — ENCOUNTER SYMPTOMS: SEIZURES: 0

## 2025-02-04 ASSESSMENT — LIFESTYLE VARIABLES: TOBACCO_USE: 0

## 2025-02-04 NOTE — ANESTHESIA CARE TRANSFER NOTE
Patient: Ana Ch    Procedure: Procedure(s):  endoscopic ultrasound, upper with biopsy using cold forcep       Diagnosis: Abnormal CT scan [R93.89]  Diagnosis Additional Information: No value filed.    Anesthesia Type:   MAC     Note:    Oropharynx: oropharynx clear of all foreign objects and spontaneously breathing  Level of Consciousness: drowsy  Oxygen Supplementation: face mask  Level of Supplemental Oxygen (L/min / FiO2): 6  Independent Airway: airway patency satisfactory and stable  Dentition: dentition unchanged  Vital Signs Stable: post-procedure vital signs reviewed and stable  Report to RN Given: handoff report given  Patient transferred to: PACU    Handoff Report: Identifed the Patient, Identified the Reponsible Provider, Reviewed the pertinent medical history, Discussed the surgical course, Reviewed Intra-OP anesthesia mangement and issues during anesthesia, Set expectations for post-procedure period and Allowed opportunity for questions and acknowledgement of understanding      Vitals:  Vitals Value Taken Time   BP 92/42 02/04/25 0942   Temp     Pulse 91 02/04/25 0945   Resp 13 02/04/25 0945   SpO2 98 % 02/04/25 0945   Vitals shown include unfiled device data.    Electronically Signed By: BRITNEY Askew CRNA  February 4, 2025  9:45 AM

## 2025-02-04 NOTE — ANESTHESIA POSTPROCEDURE EVALUATION
Patient: Ana Ch    Procedure: Procedure(s):  endoscopic ultrasound, upper with biopsy using cold forcep  ESOPHAGOGASTRODUODENOSCOPY, WITH BIOPSY       Anesthesia Type:  MAC    Note:  Disposition: Inpatient   Postop Pain Control: Uneventful            Sign Out: Well controlled pain   PONV: No   Neuro/Psych: Uneventful            Sign Out: Acceptable/Baseline neuro status   Airway/Respiratory: Uneventful            Sign Out: Acceptable/Baseline resp. status   CV/Hemodynamics: Uneventful            Sign Out: Acceptable CV status; No obvious hypovolemia; No obvious fluid overload   Other NRE: NONE   DID A NON-ROUTINE EVENT OCCUR? No           Last vitals:  Vitals Value Taken Time   BP     Temp     Pulse     Resp     SpO2         Electronically Signed By: Hair Ch DO  February 4, 2025  12:08 PM

## 2025-02-04 NOTE — ANESTHESIA PREPROCEDURE EVALUATION
Anesthesia Pre-Procedure Evaluation    Patient: Ana Ch   MRN: [id number] : 1990        Procedure : Procedure(s):  endoscopic ultrasound, upper          Past Medical History:   Diagnosis Date    Anxiety     Bladder tumor     Chronic kidney disease     Depression     Depressive disorder     Exposure to human immunodeficiency virus     Foot pain, bilateral     Gout     Hip pain     HTN (hypertension)     Hypertension     Controled with medication    Obesity     Prostatitis     Renal disease     Urinary retention     Urinary urgency       Past Surgical History:   Procedure Laterality Date    BIOPSY      CYSTOSCOPY, TRANSURETHRAL RESECTION (TUR) TUMOR BLADDER, COMBINED N/A 2023    Procedure: CYSTOSCOPY AND TRANSURETHRAL RESECTION OF BLADDER TUMORS;  Surgeon: Gaston Villarreal MD;  Location: [facility] [place]    REPAIR PTOSIS Bilateral 2007    REPAIR PTOSIS Right 2021    Procedure: Right upper ptosis repair;  Surgeon: Hair Breen MD;  Location: [facility] [place] OR    REPAIR PTOSIS Right 2022    Procedure: right upper eyelid ptosis repair;  Surgeon: Hair Breen MD;  Location: [facility] [place] OR      Allergies   Allergen Reactions    Nifedipine      Needs to have osmotic extended release Procardia XL otherwise gets flushing      Social History     Tobacco Use    Smoking status: Never    Smokeless tobacco: Never   Substance Use Topics    Alcohol use: Yes     Comment: 5/10/23-One drink per week      Wt Readings from Last 1 Encounters:   10/05/23 115.7 kg (255 lb)        Anesthesia Evaluation            ROS/MED HX  ENT/Pulmonary:     (+)     ANITA risk factors,  hypertension, obese,                             (-) tobacco use   Neurologic:    (-) no seizures, no CVA and no TIA   Cardiovascular:     (+)  hypertension- -   -  - -                                      METS/Exercise Tolerance:     Hematologic: Comments: Anemia of chronic disease    (+)      anemia,          Musculoskeletal: Comment: gout     "  GI/Hepatic:       Renal/Genitourinary: Comment: CKD stage 4 secondary to focal segmental glomerulosclerosis. Dialyzes MWF    (+) renal disease, type: CRI, Pt requires dialysis, type: Hemodialysis,          Endo: Comment: BMI 36    (+)               Obesity,       Psychiatric/Substance Use:     (+) psychiatric history anxiety and depression       Infectious Disease:       Malignancy:       Other: Comment: Transplant work up           Physical Exam    Airway        Mallampati: II   TM distance: > 3 FB   Neck ROM: full   Mouth opening: > 3 cm    Respiratory Devices and Support         Dental  no notable dental history     (+) Completely normal teeth      Cardiovascular   cardiovascular exam normal          Pulmonary   pulmonary exam normal        breath sounds clear to auscultation           OUTSIDE LABS:  CBC:   Lab Results   Component Value Date    WBC 8.7 06/22/2023    WBC 10.8 10/02/2020    HGB 12.6 (L) 06/22/2023    HGB 14.3 10/02/2020    HCT 38.6 (L) 06/22/2023    HCT 43.5 10/02/2020     06/22/2023     10/02/2020     BMP:   Lab Results   Component Value Date     06/22/2023    POTASSIUM 3.8 06/22/2023    CHLORIDE 94 (L) 06/22/2023    CO2 28 06/22/2023    BUN 36.2 (H) 06/22/2023    CR 7.37 (H) 06/22/2023     (H) 06/22/2023     COAGS:   Lab Results   Component Value Date    PTT 29 06/22/2023    INR 1.12 06/22/2023     POC: No results found for: \"BGM\", \"HCG\", \"HCGS\"  HEPATIC:   Lab Results   Component Value Date    ALBUMIN 4.7 06/22/2023    PROTTOTAL 7.5 06/22/2023    ALT 29 06/22/2023    AST 21 06/22/2023    ALKPHOS 87 06/22/2023    BILITOTAL 0.4 06/22/2023     OTHER:   Lab Results   Component Value Date    JOSHUA 10.6 (H) 07/20/2023       Anesthesia Plan    ASA Status:  4    NPO Status:  NPO Appropriate    Anesthesia Type: MAC.     - Reason for MAC: immobility needed, straight local not clinically adequate              Consents    Anesthesia Plan(s) and associated risks, benefits, and " realistic alternatives discussed. Questions answered and patient/representative(s) expressed understanding.     - Discussed:     - Discussed with:  Patient      - Extended Intubation/Ventilatory Support Discussed: No.      - Patient is DNR/DNI Status: No     Use of blood products discussed: Yes.     - Discussed with: Patient.     - Consented: consented to blood products            Reason for refusal: other.     Postoperative Care    Pain management: IV analgesics, Oral pain medications, Multi-modal analgesia.   PONV prophylaxis: Ondansetron (or other 5HT-3), Dexamethasone or Solumedrol, Background Propofol Infusion     Comments:               Hair Ch, DO    I have reviewed the pertinent notes and labs in the chart from the past 30 days and (re)examined the patient.  Any updates or changes from those notes are reflected in this note.    Clinically Significant Risk Factors Present on Admission                   # Hypertension: Noted on problem list

## 2025-02-07 LAB
PATH REPORT.COMMENTS IMP SPEC: NORMAL
PATH REPORT.COMMENTS IMP SPEC: NORMAL
PATH REPORT.FINAL DX SPEC: NORMAL
PATH REPORT.GROSS SPEC: NORMAL
PATH REPORT.MICROSCOPIC SPEC OTHER STN: NORMAL
PATH REPORT.MICROSCOPIC SPEC OTHER STN: NORMAL
PATH REPORT.RELEVANT HX SPEC: NORMAL
PHOTO IMAGE: NORMAL

## 2025-02-19 ENCOUNTER — TELEPHONE (OUTPATIENT)
Dept: TRANSPLANT | Facility: CLINIC | Age: 35
End: 2025-02-19
Payer: COMMERCIAL

## 2025-02-19 ENCOUNTER — COMMITTEE REVIEW (OUTPATIENT)
Dept: TRANSPLANT | Facility: CLINIC | Age: 35
End: 2025-02-19
Payer: COMMERCIAL

## 2025-02-19 NOTE — COMMITTEE REVIEW
Kidney/Pancreas Committee Review Note     Evaluation Date: 6/22/2023  Committee Review Date: 2/19/2025    Organ being evaluated for: Kidney    Transplant Phase: Waitlist  Transplant Status: Inactive    Transplant Coordinator: Helga Arteaga  Transplant Surgeon:        Referring Physician: Referred Self    Primary Diagnosis: Focal Glomerular Sclerosis (Focal Segmental - FSG)  Secondary Diagnosis:     Committee Review Members:  Nephrology Coral Tatum, NP, Argenis Mccoy MD, Kane Pulido, APRN CNP, Jada Hill MD   Nurse Nini Owens, APRYADIRA NELSON, NILS Blanc-SASKIA   Nutrition Nadine Sandy, AUDI   Pharmacist Chantel Jennings, [facility]    - Clinical Jennifer Gonzales, MSW, Loc Reich, [facility]   Transplant GERRI MILLIGAN, RN, Felicity Laguerre, RN, Kaycee Burr, RN, Helga Arteaga, CITLALLI, Casi Dacosta, RN, Fatimah Perla, CITLALLI, Austin Centeno MD, Thelma Em, RN   Transplant Surgery Austin Centeno MD       Transplant Eligibility:     Committee Review Decision: Make Active    Relative Contraindications:     Absolute Contraindications:      Committee Chair Austin Centeno MD verbally attested to the committee's decision.    Committee Discussion Details:     36yo w/ ESKD from [facility] on HD since 2023     From last committee review pt needed: weight loss, CT a/p, and [facility] follow up.      BMI: transplant surgeon visit updated 12/16/24 with Dr. Crowe/ Dr. Centeno. BMI cleared.      [facility]: seen for gastric lesion noted on CT. completed EUS on 2/4/25, pathology with oxyntic type gastric mucosa w/ mild chronic inflammation. Negative for malignancy.      CT a/p: reviewed by surgeon at visit, normal vessels.     Committee determined pt is acceptable for active status on the kidney waitlist. Will verify insurance and update listing once obtained.

## 2025-02-19 NOTE — TELEPHONE ENCOUNTER
Called pt to update on committee approval for active status. Waiting on insurance approval then will call with active status education. Pt reports he has some supplemental insurance that he would like to talk to PFR about, provided contact information to pt. Pt verbalized understanding of information and has no further questions. Encouraged to reach out if questions arise.

## 2025-02-20 ENCOUNTER — DOCUMENTATION ONLY (OUTPATIENT)
Dept: TRANSPLANT | Facility: CLINIC | Age: 35
End: 2025-02-20
Payer: COMMERCIAL

## 2025-02-20 ENCOUNTER — TELEPHONE (OUTPATIENT)
Dept: TRANSPLANT | Facility: CLINIC | Age: 35
End: 2025-02-20
Payer: COMMERCIAL

## 2025-02-20 NOTE — TELEPHONE ENCOUNTER
Hepatitis C Donor Acceptance      -Called pt in regards to the possibility of accepting a  or living donor kidney that has known Hepatitis C infection (past or current).  Informed pt due to shortage of organs, the transplant team is always looking for safe alternatives to increase chances of transplant. One means of increasing chance of transplant is to accept an organ that may have been exposed to hepatitis C.  Even if the organ accepted has preliminary evidence of hepatitis C infection, there is a possibility that pt may not contract hepatitis C from it.  If hepatitis C is contracted, it is treatable.     -Reviewed that Hepatitis C (HCV) is an infection caused by the Hepatitis C virus. Hepatitis C may be spread from one person to another through contact with the blood of an infected person.  This virus can attack the liver and cause injury. If not treated, this virus can lead to liver injury.     -Reviewed that all  donor offer organs are checked for hepatitis C infection. A small number of organs test clearly positive for active hepatitis C infection (Approximately 5% nationally of  donors). A few organs have testing that can indicate the donor may have been infected with hepatitis C but it is not clear whether the organ is currently infected with the virus or may have been infected in the past.     -If the donor has been exposed to hepatitis C but DOES NOT have active virus in the bloodstream, it is very unlikely that pt will develop hepatitis C infection. If the donor DOES have active virus in the bloodstream, there is a very high likelihood (almost 100%) that hep C will develop. Regardless, pt will be monitored closely by your post transplant team for any signs of Hepatitis C infection and treated appropriately.     -Reviewed there are several medications available to treat hepatitis C, and the treatments are more effective and have fewer side effects than previous treatment options.  These medications are taken by mouth daily for 4 to 24 weeks.  When taken as prescribed, these medications have cure rates of 90% to 100% based on currently available data from large studies.  They are safe to take after transplant.     -Reviewed that if pt chooses not to accept a hepatitis C exposed donor, it will not affect their status on the transplant list.  Donor organs that are hepatitis C-exposed will not be offered to you. We will continue to consider non-hepatitis C -exposed donor offers for you. Pt care will not be negatively affected in any way.      -Answered patient questions regarding Hep C donor acceptance     -Pt declines to be listed for Hep C positive donors .  Pt has declined, will make sure UNOS and EPIC chart reflect this option

## 2025-02-20 NOTE — TELEPHONE ENCOUNTER
Called patient to inform them of status change to ACTIVE on the Kidney alone list today 25. Status change letter and PRA orders to be mailed. Patient is in agreement with listing ACTIVE status.      Discussed:   - Pt is eligible for  donor offers and pt can receive calls 24 hours a day, 7 days a week, and on call staff need to be able to reach pt or one of emergency contacts within 30 minutes or they might skip over to next recipient on list.   -Please make sure your phone is charged at all times and your voicemail is not full   -Your transplant on call coordinator will give you directions about when and where you will need to come to the hospital for transplant  -The transplant coordinator will call you to discuss your current health status, the offer, and plans to move forward.  Some organ offer calls will require you to come to the hospital immediately; some may not be as time sensitive.  It may be possible for you to come to the hospital and, for various reasons, the transplant could be cancelled.  This is often called a  dry run .  - Reviewed the right to accept or decline offer, without penalty  - Expected length of surgical procedure is about 4-6 hours, expected inpatient length of stay post transplant is 3-4 days, and potential to stay locally post transplant. Offered resources for lodging in the area  - Verified contact information as documented in Epic. Confirmed emergency contact information  -Instructed patient about importance of having PRAs drawn every 3 months via: ([facility]). Encouraged them to set reminder in their preferred calendar to stay on top of their quarterly labs  -Reminded pt to stay up on health maintenance and to call with any updates on their health status, insurance or contact information.   -Reminded pt to inform RNCC as soon as possible if they test positive for COVID.  -Donor website was provided     -Last PRA was 24     -Provided name and contact  information for additional questions or concerns.  Pt expressed excellent understanding of all and was in good agreement with the plan.

## 2025-03-19 ENCOUNTER — TELEPHONE (OUTPATIENT)
Dept: TRANSPLANT | Facility: CLINIC | Age: 35
End: 2025-03-19
Payer: COMMERCIAL

## 2025-03-19 DIAGNOSIS — Z76.82 AWAITING ORGAN TRANSPLANT: Primary | ICD-10-CM

## 2025-03-19 NOTE — TELEPHONE ENCOUNTER
Received call from Danielle with [facility], pts HD center. They received active status letter from Feb 2025 but no PRA orders. They are wondering if this needs to be drawn. Would like call back. Call routed to LPN to send orders and return call.

## 2025-03-24 ENCOUNTER — LAB (OUTPATIENT)
Dept: LAB | Facility: CLINIC | Age: 35
End: 2025-03-24
Payer: COMMERCIAL

## 2025-03-24 DIAGNOSIS — Z76.82 ORGAN TRANSPLANT CANDIDATE: ICD-10-CM

## 2025-03-24 DIAGNOSIS — Z01.818 PRE-TRANSPLANT EVALUATION FOR KIDNEY TRANSPLANT: ICD-10-CM

## 2025-03-24 DIAGNOSIS — N05.1 FSGS (FOCAL SEGMENTAL GLOMERULOSCLEROSIS): ICD-10-CM

## 2025-03-24 DIAGNOSIS — N18.6 END STAGE RENAL DISEASE (H): ICD-10-CM

## 2025-03-24 DIAGNOSIS — I10 ESSENTIAL HYPERTENSION: ICD-10-CM

## 2025-06-15 ENCOUNTER — HEALTH MAINTENANCE LETTER (OUTPATIENT)
Age: 35
End: 2025-06-15

## 2025-06-16 ENCOUNTER — TELEPHONE (OUTPATIENT)
Dept: TRANSPLANT | Facility: CLINIC | Age: 35
End: 2025-06-16
Payer: COMMERCIAL

## 2025-06-16 NOTE — TELEPHONE ENCOUNTER
Well-Check Questionnaire    June 16, 2025  Patient: Ana  Interviewer: Helga      Have you been admitted to the hospital since we last saw you or had a recent ED visits? No  If yes, what were you hospitalized for?  N/a  What facility were you admitted to?  N/a  When did this occur?  N/a  Did you complete all the recommended follow-up testing and visits, if applicable?  Have you had any surgeries or procedures since we last saw you?  No  If yes, what procedures were completed?  N/a  What facility performed the procedure?  N/a  When did this occur?  N/a  Did you complete all the recommended follow-up testing and visits, if applicable?  N/a  Do you or have you ever been told you have PVD and/or other vascular issues?  No  If yes, what have you been diagnosed with?  N/a  Who is the physician treating this issue?  N/a  Do you or have you ever been told you have Cardiac problems and/or issues?  No  If yes, what have you been diagnosed with?  N/a  Who is the physician treating this issue?  N/a  Do you or have you ever been told you have Pulmonary problems and/or Issues?  No  If yes, what have you been diagnosed with?  N/a  Who is the physician treating this issue?  N/a  Do you have any current medical issues that are requiring the monitoring of a physician, that you did not note above (examples: open or non-healing wounds, hematologic conditions, etc.)?  No  If yes, what have you been diagnosed with?  N/a  Who is the physician treating this issue?  N/a  Do you currently use or take any of the following:  Oxygen No  If yes, how many liters and how often do you use it?  Midodrine (for hypotension)  No  If yes, what is your current dosage and how often used?  Florinef (for hypotension)  No  If yes, what is your current dosage and how often used?  N/a  Anticoagulation or anti-platelet medications including Coumadin/Warfarin, Plavix, Eliquis, Pradaxa, Brilinta, and/or Aspirin?  No  If you, what medication are you taking and  indication for use?  N/a  Antibiotic(s)  No  If yes, what medication are you taking and indication for use?  N/a  Describe your functional status, your ability to walk around and care for yourself:  How far can you walk without stopping (Example: 1-2 Blocks)?  Over a mile and a half   Do you have any limitations or use assistive devices such as a walker or cane? N/a   Are you able to complete your Activities of Daily Living (ADL's) without         assistance? N/a  Are you currently on Dialysis?  yes  If yes, which type (Hemodialysis or Peritoneal)?  Hemo  What center do you attend?  [facility] ( center in snapshot of chart)  Which days of the week do you do dialysis?  MWF  Describe your current social situation:  Where and with whom do you reside?  [place], alone   What is your Post-Transplant Caregiver Plan?  Pts mom, Melinda and friend Flor   What is your Post-Transplant Lodging Plan?  Plan would be able to go home   What is your current Insurance coverage?  BCBS  Has this changed since you were last seen by us?  no  Do you have any questions or concerns related to your transplant listing/next steps in the transplant process?  No

## 2025-06-23 ENCOUNTER — LAB (OUTPATIENT)
Dept: LAB | Facility: CLINIC | Age: 35
End: 2025-06-23
Payer: COMMERCIAL

## 2025-06-23 DIAGNOSIS — Z01.818 PRE-TRANSPLANT EVALUATION FOR KIDNEY TRANSPLANT: ICD-10-CM

## 2025-06-23 DIAGNOSIS — N05.1 FSGS (FOCAL SEGMENTAL GLOMERULOSCLEROSIS): ICD-10-CM

## 2025-06-23 DIAGNOSIS — N18.6 END STAGE RENAL DISEASE (H): ICD-10-CM

## 2025-06-23 DIAGNOSIS — I10 ESSENTIAL HYPERTENSION: ICD-10-CM

## 2025-06-23 DIAGNOSIS — Z76.82 ORGAN TRANSPLANT CANDIDATE: ICD-10-CM

## 2025-06-23 PROCEDURE — 86832 HLA CLASS I HIGH DEFIN QUAL: CPT

## 2025-06-23 PROCEDURE — 86833 HLA CLASS II HIGH DEFIN QUAL: CPT

## 2025-07-12 ENCOUNTER — OFFICE VISIT (OUTPATIENT)
Dept: URGENT CARE | Facility: URGENT CARE | Age: 35
End: 2025-07-12
Payer: COMMERCIAL

## 2025-07-12 VITALS
OXYGEN SATURATION: 99 % | SYSTOLIC BLOOD PRESSURE: 149 MMHG | DIASTOLIC BLOOD PRESSURE: 86 MMHG | RESPIRATION RATE: 20 BRPM | HEIGHT: 68 IN | WEIGHT: 249.4 LBS | TEMPERATURE: 99.7 F | BODY MASS INDEX: 37.8 KG/M2 | HEART RATE: 100 BPM

## 2025-07-12 DIAGNOSIS — R36.9 URETHRAL DISCHARGE: Primary | ICD-10-CM

## 2025-07-12 LAB
ALBUMIN UR-MCNC: >=300 MG/DL
APPEARANCE UR: CLEAR
BACTERIA #/AREA URNS HPF: ABNORMAL /HPF
BILIRUB UR QL STRIP: NEGATIVE
COLOR UR AUTO: YELLOW
GLUCOSE UR STRIP-MCNC: NEGATIVE MG/DL
HGB UR QL STRIP: NEGATIVE
HIV 1+2 AB+HIV1 P24 AG SERPL QL IA: NONREACTIVE
KETONES UR STRIP-MCNC: NEGATIVE MG/DL
LEUKOCYTE ESTERASE UR QL STRIP: ABNORMAL
NITRATE UR QL: NEGATIVE
PH UR STRIP: 7.5 [PH] (ref 5–7)
RBC #/AREA URNS AUTO: ABNORMAL /HPF
SP GR UR STRIP: 1.01 (ref 1–1.03)
SQUAMOUS #/AREA URNS AUTO: ABNORMAL /LPF
UROBILINOGEN UR STRIP-ACNC: 0.2 E.U./DL
WBC #/AREA URNS AUTO: ABNORMAL /HPF

## 2025-07-12 PROCEDURE — 36415 COLL VENOUS BLD VENIPUNCTURE: CPT | Performed by: PHYSICIAN ASSISTANT

## 2025-07-12 PROCEDURE — 87491 CHLMYD TRACH DNA AMP PROBE: CPT | Performed by: PHYSICIAN ASSISTANT

## 2025-07-12 PROCEDURE — 87389 HIV-1 AG W/HIV-1&-2 AB AG IA: CPT | Performed by: PHYSICIAN ASSISTANT

## 2025-07-12 PROCEDURE — 99213 OFFICE O/P EST LOW 20 MIN: CPT | Performed by: PHYSICIAN ASSISTANT

## 2025-07-12 PROCEDURE — 86780 TREPONEMA PALLIDUM: CPT | Performed by: PHYSICIAN ASSISTANT

## 2025-07-12 PROCEDURE — 3079F DIAST BP 80-89 MM HG: CPT | Performed by: PHYSICIAN ASSISTANT

## 2025-07-12 PROCEDURE — 81001 URINALYSIS AUTO W/SCOPE: CPT | Performed by: PHYSICIAN ASSISTANT

## 2025-07-12 PROCEDURE — 3077F SYST BP >= 140 MM HG: CPT | Performed by: PHYSICIAN ASSISTANT

## 2025-07-12 PROCEDURE — 87591 N.GONORRHOEAE DNA AMP PROB: CPT | Performed by: PHYSICIAN ASSISTANT

## 2025-07-12 NOTE — PROGRESS NOTES
Urgent Care Clinic Visit    Chief Complaint   Patient presents with    Urgent Care    STD     Patient is here for STD screening, patient reports having discharge coming from genital area that started this afternoon.                7/12/2025     5:05 PM   Additional Questions   Roomed by SRAVANI Lewis MA on 7/12/2025 at 5:12 PM

## 2025-07-12 NOTE — PROGRESS NOTES
Urethral discharge  - NEISSERIA GONORRHOEA PCR; Future  - CHLAMYDIA TRACHOMATIS PCR; Future  - UA Macroscopic with reflex to Microscopic and Culture - Clinic Collect  - HIV Antigen Antibody Combo  - Treponema Abs w Reflex to RPR and Titer  - CHLAMYDIA TRACHOMATIS PCR  - NEISSERIA GONORRHOEA PCR  - UA Microscopic with Reflex to Culture    Considering the patient's end stage renal disease, we opted to hold off on treatment for now and monitor symptoms. Treatment only needed if gonorrhea and/or chlamydia.       Patient educated on red flag symptoms and asked to go directly to the ED if these symptoms present themselves.       Vinod Moralez PA-C  [facility] URGENT [facility]ARE    Subjective   35 year old who presents to clinic today for the following health issues:    Urgent Care and STD       HPI     Genitourinary - Male  Onset/Duration: Patient is here for STD screening, patient reports having discharge coming from genital area that started this afternoon. Patient has a history of Prostatitis. Patient states that he has had some some prostate discomfort recently but it is hard to describe   Description:   Dysuria (painful urination): No  Hematuria (blood in urine): No  Frequency: No  Waking at night to urinate: No  Hesitancy (delay in urine): No  Retention (unable to empty): No  Decrease in urinary flow: No  Incontinence: No  Progression of Symptoms:  same  Accompanying Signs & Symptoms:  Fever: YES  Back/Flank pain: No  Urethral discharge: Cloudy discharge   Testicle lumps/masses/pain: No  Nausea and/or vomiting: No  Abdominal pain: No  History:   History of frequent UTI s: No  History of kidney stones: No  History of hernias: No  Personal or Family history of Prostate problems: Hx of Prostatitis   Sexually active: YES- (No known partners who have been positive for anything)  Precipitating or alleviating factors: None  Therapies tried and outcome: none    Patient has end stage renal disease and is on dialysis.      Review of Systems   Review of Systems   See HPI    Objective    Temp: 99.7  F (37.6  C) Temp src: Oral BP: (!) 149/86 Pulse: 100   Resp: 20 SpO2: 99 %       Physical Exam   Physical Exam  Constitutional:       General: He is not in acute distress.     Appearance: Normal appearance. He is normal weight. He is not ill-appearing, toxic-appearing or diaphoretic.   HENT:      Head: Normocephalic and atraumatic.   Cardiovascular:      Rate and Rhythm: Normal rate.      Pulses: Normal pulses.   Pulmonary:      Effort: Pulmonary effort is normal. No respiratory distress.   Abdominal:      Tenderness: There is no right CVA tenderness or left CVA tenderness.   Neurological:      General: No focal deficit present.      Mental Status: He is alert and oriented to person, place, and time. Mental status is at baseline.      Gait: Gait normal.   Psychiatric:         Mood and Affect: Mood normal.         Behavior: Behavior normal.         Thought Content: Thought content normal.         Judgment: Judgment normal.          Recent Results (from the past 24 hours)   UA Macroscopic with reflex to Microscopic and Culture - Clinic Collect    Specimen: Urine, Midstream   Result Value Ref Range    Color Urine Yellow Colorless, Straw, Light Yellow, Yellow    Appearance Urine Clear Clear    Glucose Urine Negative Negative mg/dL    Bilirubin Urine Negative Negative    Ketones Urine Negative Negative mg/dL    Specific Gravity Urine 1.015 1.003 - 1.035    Blood Urine Negative Negative    pH Urine 7.5 (H) 5.0 - 7.0    Protein Albumin Urine >=300 (A) Negative mg/dL    Urobilinogen Urine 0.2 0.2, 1.0 E.U./dL    Nitrite Urine Negative Negative    Leukocyte Esterase Urine Trace (A) Negative   UA Microscopic with Reflex to Culture   Result Value Ref Range    Bacteria Urine Few (A) None Seen /HPF    RBC Urine 0-2 0-2 /HPF /HPF    WBC Urine 0-5 0-5 /HPF /HPF    Squamous Epithelials Urine None Seen None Seen /LPF    Narrative    Urine Culture not  indicated       All labs that were finalized during the visit were reviewed by me personally. Any pending labs will be reviewed by urgent care staff in the following days. Patient will be notified either via Mychart message or phone call of any pertinent abnormal results. Patient was informed that we will not necessarily reach out if pending lab results are normal.

## 2025-07-14 LAB
C TRACH DNA SPEC QL NAA+PROBE: NEGATIVE
N GONORRHOEA DNA SPEC QL NAA+PROBE: NEGATIVE
SPECIMEN TYPE: NORMAL
SPECIMEN TYPE: NORMAL
T PALLIDUM AB SER QL: NONREACTIVE

## 2025-07-15 ENCOUNTER — ALLIED HEALTH/NURSE VISIT (OUTPATIENT)
Dept: RESEARCH | Facility: CLINIC | Age: 35
End: 2025-07-15
Payer: COMMERCIAL

## 2025-07-15 DIAGNOSIS — Z00.6 ENCOUNTER FOR EXAMINATION FOR NORMAL COMPARISON OR CONTROL IN CLINICAL RESEARCH PROGRAM: Primary | ICD-10-CM

## 2025-07-15 NOTE — PROGRESS NOTES
..Surg[facility] Informed Consent Process Documentation    Study Name: [facility] Of Screening Kidney  Transplant Candidates for Coronary Artery Disease (CARSK)    IRB#: [id number]    ICF Version Date / IRB Approval Date:  Version Date 11/20/2024    Date of Approach/Consent: 7/15/2025  This patient has consented to participate in the CARSK study. This is a multinational randomized controlled trial in which asymptomatic waitlist candidates are randomized to the standard of care regular screening for occult coronary artery disease, or non-screening once they are active on the waitlist..  The study prohibits the use of screening tests for coronary artery disease (i.e., MIBI, exercise stress test, coronary CT, dobutamine stress echo) in asymptomatic patients.  Any patient with signs of symptoms of coronary artery disease may be evaluated using any test necessary including the above mentioned tests as per your usual standard of care.   If the patient develops clinical signs or symptoms of coronary artery disease, they may be evaluated using any test necessary including the above mentioned tests as per your usual standard of care.  The patient will remain in this study until 7/15/2030 that is 60 months after the date of enrollment or until one year after the transplantation, whichever occurs first.  Please advise us if the patient requires a screening test for clinical signs or symptoms of coronary artery disease prior to completion of any of these tests.    The subject was screened and meets all of the inclusion criteria and none of the exclusion criteria is met. The subject was told:  -that the study involves research   -the purpose of the research study  -the expected duration of the study and the approximate number of subject sought  -of procedures that are identified as experimental  -of reasonably foreseeable risks or discomforts to the subject  -of any benefits to the subject or others that  may be expected from the research  -of alternative procedures and/or treatment  -how the confidentiality of records would be maintained  -whether or not compensation and medical treatments are available should injury occur as a result of the study  -who to contact if they have questions related to the research study or questions regarding research subjects' rights  -that participation is completely voluntary and that their decision to or not to participate will have no impact on their relationships with the [facility] and the staff    No study procedures were completed prior to the consent being obtained.  The use of historical information (lab or assessments) used for the purpose of the study was approved by subject.  The subject was fully aware that we would be reviewing their medical record for the study.    The subject demonstrated an understanding of what the study involved.  Specifically, how this study differed from standard of care at our center and what was required of the subject as part of the study.    The subject reviewed the consent form and was given the opportunity to ask questions before signing.  Questions and concerns were answered by the study staff and/or study physician.    A copy of the signed informed consent document was provided to the subject.  [ x] Yes [ ] No    The subject was offered a copy of the signed informed consent but declined. [ x] Yes [ ] No    The consent required the use of a :   [ ] Yes [x ]  No       A 'short form' consent was used:     [ ] Yes [x ] No         If yes, provide name of witness:     The subject required a legally-authorized representative (LAR) to sign on their behalf:                                                    [ ] Yes  [x ] No  If yes, please record name of LAR:     Questions to Evaluate Subject Comprehension of Study:    Question:   Adequate Response? If No, explain what actions were taken  What is being studied? [x ] Yes [ ] No  If you  participate, what will be different than if you decide not to participate? [x ] Yes [ ] No  How long will the study last; will you be required to return for visits?  [ x] Yes [ ] No  What kinds of risks are there?  [ x] Yes  [ ] No  Do you understand that you can withdraw consent at any time and for any reason while participating in the study?   [ x] Yes [ ] No      : Kanika Lund   [phone]                                  : Clarice Barney

## 2025-07-15 NOTE — PROGRESS NOTES
Study Name: [facility] Randomized Trial Of Screening Kidney  Transplant Candidates for Coronary Artery Disease (CARSK)    IRB#: [id number]    ICF Version Date / IRB Approval Date:  Version Date 11/20/2024     Date of Approach/Consent: 07/15/2025  This patient has consented to participate in the CARSK study. This is a multinational randomized controlled trial in which asymptomatic waitlist candidates are randomized to the standard of care regular screening for occult coronary artery disease, or non-screening once they are active on the waitlist..  The study prohibits the use of screening tests for coronary artery disease (i.e., MIBI, exercise stress test, coronary CT, dobutamine stress echo) in asymptomatic patients.  Any patient with signs of symptoms of coronary artery disease may be evaluated using any test necessary including the above mentioned tests as per your usual standard of care.   If the patient develops clinical signs or symptoms of coronary artery disease, they may be evaluated using any test necessary including the above mentioned tests as per your usual standard of care.  The patient will remain in this study until 07/15/2030 that is 60 months after the date of enrollment or until one year after the transplantation, whichever occurs first.  Please advise us if the patient requires a screening test for clinical signs or symptoms of coronary artery disease prior to completion of any of these tests.    The subject was screened and meets all of the inclusion criteria and none of the exclusion criteria is met. The subject was told:  -that the study involves research   -the purpose of the research study  -the expected duration of the study and the approximate number of subject sought  -of procedures that are identified as experimental  -of reasonably foreseeable risks or discomforts to the subject  -of any benefits to the subject or others that may be expected from the research  -of  alternative procedures and/or treatment  -how the confidentiality of records would be maintained  -whether or not compensation and medical treatments are available should injury occur as a result of the study  -who to contact if they have questions related to the research study or questions regarding research subjects' rights  -that participation is completely voluntary and that their decision to or not to participate will have no impact on their relationships with the [facility] and the staff    No study procedures were completed prior to the consent being obtained.  The use of historical information (lab or assessments) used for the purpose of the study was approved by subject.  The subject was fully aware that we would be reviewing their medical record for the study.    The subject demonstrated an understanding of what the study involved.  Specifically, how this study differed from standard of care at our center and what was required of the subject as part of the study.    The subject reviewed the consent form and was given the opportunity to ask questions before signing.  Questions and concerns were answered by the study staff and/or study physician.    ***A copy of the signed informed consent document was provided to the subject.  [ x] Yes [ ] No    ***The subject was offered a copy of the signed informed consent but declined. [x ] Yes [ ] No    ***The consent required the use of a :   [ ] Yes [ x]  No       ***A 'short form' consent was used:     [ ] Yes [ x] No         If yes, provide name of witness:     ***The subject required a legally-authorized representative (LAR) to sign on their behalf:                                                    [ ] Yes  [ x] No  If yes, please record name of LAR:     Questions to Evaluate Subject Comprehension of Study:    Question:   Adequate Response? If No, explain what actions were taken  ***What is being studied? [ x] Yes [ ] No  ***If you participate, what will be  different than if you decide not to participate? [ x] Yes [ ] No  ***How long will the study last; will you be required to return for visits?  [ x] Yes [ ] No  ***What kinds of risks are there? *** [ x] Yes  [ ] No  ***Do you understand that you can withdraw consent at any time and for any reason while participating in the study?  *** [ x] Yes [ ] No    PT was randomized to: Regular screening    :  Kanika Lund                                    :   Dr. Jim Melendez

## 2025-08-26 ENCOUNTER — TELEPHONE (OUTPATIENT)
Dept: TRANSPLANT | Facility: CLINIC | Age: 35
End: 2025-08-26
Payer: COMMERCIAL

## (undated) DEVICE — STRAP CATH LEG ADJUSTABLE 0814-8200

## (undated) DEVICE — SOL WATER IRRIG 3000ML BAG 2B7117

## (undated) DEVICE — EYE PREP BETADINE 5% SOLUTION 30ML 0065-0411-30

## (undated) DEVICE — JUMPSUIT CYSTO DISP SD-100

## (undated) DEVICE — PACK MINOR EYE CUSTOM ASC

## (undated) DEVICE — ESU EYE HIGH TEMP 65410-183

## (undated) DEVICE — BAG URINARY DRAIN 2000ML LF 154002

## (undated) DEVICE — SPONGE RAY-TEC 4X8" 7318

## (undated) DEVICE — CATH FOLEY 20FR 5ML LUBRICATH LATEX 0165L20

## (undated) DEVICE — SYR 30ML LL W/O NDL 302832

## (undated) DEVICE — LINEN TOWEL PACK X5 5464

## (undated) DEVICE — GLOVE PROTEXIS MICRO 7.5  2D73PM75

## (undated) DEVICE — SUCTION MANIFOLD NEPTUNE 2 SYS 1 PORT 702-025-000

## (undated) DEVICE — TUBING SET THERMEDX UROLOGY SGL USE LL0006

## (undated) DEVICE — SOL WATER IRRIG 500ML BOTTLE 2F7113

## (undated) DEVICE — SOL WATER IRRIG 1000ML BOTTLE 2F7114

## (undated) DEVICE — MAT FLOOR SURGICAL 40X38 0702140238

## (undated) DEVICE — PREP DYNA-HEX 4% CHG SCRUB 4OZ BOTTLE MDS098710

## (undated) DEVICE — PLATE GROUNDING ADULT W/CORD 9165L

## (undated) DEVICE — GLOVE SURG PI ULTRA TOUCH M SZ 7-1/2 LF

## (undated) DEVICE — TUBING SUCTION MEDI-VAC 1/4"X20' N620A - HE

## (undated) DEVICE — CUSTOM PACK CYSTO PREFERRED SOT5BCYHEA

## (undated) RX ORDER — FENTANYL CITRATE 50 UG/ML
INJECTION, SOLUTION INTRAMUSCULAR; INTRAVENOUS
Status: DISPENSED
Start: 2023-02-21

## (undated) RX ORDER — PROPOFOL 10 MG/ML
INJECTION, EMULSION INTRAVENOUS
Status: DISPENSED
Start: 2022-02-09

## (undated) RX ORDER — PROPOFOL 10 MG/ML
INJECTION, EMULSION INTRAVENOUS
Status: DISPENSED
Start: 2021-11-17

## (undated) RX ORDER — DEXAMETHASONE SODIUM PHOSPHATE 4 MG/ML
INJECTION, SOLUTION INTRA-ARTICULAR; INTRALESIONAL; INTRAMUSCULAR; INTRAVENOUS; SOFT TISSUE
Status: DISPENSED
Start: 2022-02-09

## (undated) RX ORDER — GLYCOPYRROLATE 0.2 MG/ML
INJECTION INTRAMUSCULAR; INTRAVENOUS
Status: DISPENSED
Start: 2022-02-09

## (undated) RX ORDER — FENTANYL CITRATE 50 UG/ML
INJECTION, SOLUTION INTRAMUSCULAR; INTRAVENOUS
Status: DISPENSED
Start: 2021-11-17

## (undated) RX ORDER — OXYCODONE HYDROCHLORIDE 5 MG/1
TABLET ORAL
Status: DISPENSED
Start: 2022-02-09

## (undated) RX ORDER — LIDOCAINE HYDROCHLORIDE 20 MG/ML
INJECTION, SOLUTION EPIDURAL; INFILTRATION; INTRACAUDAL; PERINEURAL
Status: DISPENSED
Start: 2021-11-17

## (undated) RX ORDER — FENTANYL CITRATE 50 UG/ML
INJECTION, SOLUTION INTRAMUSCULAR; INTRAVENOUS
Status: DISPENSED
Start: 2022-02-09

## (undated) RX ORDER — CEPHALEXIN 500 MG/1
CAPSULE ORAL
Status: DISPENSED
Start: 2023-10-05

## (undated) RX ORDER — ONDANSETRON 2 MG/ML
INJECTION INTRAMUSCULAR; INTRAVENOUS
Status: DISPENSED
Start: 2022-02-09

## (undated) RX ORDER — ACETAMINOPHEN 325 MG/1
TABLET ORAL
Status: DISPENSED
Start: 2021-11-17

## (undated) RX ORDER — LIDOCAINE HYDROCHLORIDE 20 MG/ML
INJECTION, SOLUTION EPIDURAL; INFILTRATION; INTRACAUDAL; PERINEURAL
Status: DISPENSED
Start: 2022-02-09